# Patient Record
Sex: FEMALE | Race: WHITE | NOT HISPANIC OR LATINO | Employment: UNEMPLOYED | URBAN - METROPOLITAN AREA
[De-identification: names, ages, dates, MRNs, and addresses within clinical notes are randomized per-mention and may not be internally consistent; named-entity substitution may affect disease eponyms.]

---

## 2023-05-16 ENCOUNTER — TELEPHONE (OUTPATIENT)
Dept: PRIMARY CARE | Facility: CLINIC | Age: 58
End: 2023-05-16
Payer: COMMERCIAL

## 2023-05-16 NOTE — TELEPHONE ENCOUNTER
She called and she was just at the urgent care and they told to call us to see if we could put an order for CT scan and for her chronic pain in neck and back and she don't want to go to the ER and they think might be a disk.

## 2023-05-17 NOTE — TELEPHONE ENCOUNTER
For CT needs OV. Ok to schedule an acute OV with any openings. Will need documentation to support imaging. Thank you!

## 2023-05-23 PROBLEM — I50.22 SYSTOLIC HEART FAILURE, CHRONIC (MULTI): Status: ACTIVE | Noted: 2023-05-23

## 2023-05-23 PROBLEM — I87.2 VENOUS INSUFFICIENCY: Status: ACTIVE | Noted: 2023-05-23

## 2023-05-23 PROBLEM — M43.02 SPONDYLOLYSIS, CERVICAL REGION: Status: ACTIVE | Noted: 2023-05-23

## 2023-05-23 PROBLEM — I73.9 PVD (PERIPHERAL VASCULAR DISEASE) (CMS-HCC): Status: ACTIVE | Noted: 2023-05-23

## 2023-05-23 PROBLEM — I50.42 CHRONIC COMBINED SYSTOLIC AND DIASTOLIC CHF, NYHA CLASS 3 (MULTI): Status: ACTIVE | Noted: 2023-05-23

## 2023-05-23 PROBLEM — R92.8 ABNORMAL MAMMOGRAM: Status: ACTIVE | Noted: 2023-05-23

## 2023-05-23 PROBLEM — K59.00 CONSTIPATION: Status: ACTIVE | Noted: 2019-10-16

## 2023-05-23 PROBLEM — M25.561 BILATERAL KNEE PAIN: Status: ACTIVE | Noted: 2023-05-23

## 2023-05-23 PROBLEM — R42 DIZZINESS AND GIDDINESS: Status: ACTIVE | Noted: 2019-10-16

## 2023-05-23 PROBLEM — E55.9 VITAMIN D DEFICIENCY: Status: ACTIVE | Noted: 2023-05-23

## 2023-05-23 PROBLEM — M79.602 PAIN IN LEFT ARM: Status: ACTIVE | Noted: 2023-05-23

## 2023-05-23 PROBLEM — E66.01 MORBID OBESITY (MULTI): Status: ACTIVE | Noted: 2020-10-05

## 2023-05-23 PROBLEM — E78.5 HYPERLIPIDEMIA: Status: ACTIVE | Noted: 2023-05-23

## 2023-05-23 PROBLEM — M54.9 ACUTE BACK PAIN: Status: ACTIVE | Noted: 2023-05-23

## 2023-05-23 PROBLEM — R14.3 FLATULENCE, ERUCTATION AND GAS PAIN: Status: ACTIVE | Noted: 2019-10-16

## 2023-05-23 PROBLEM — E11.9 TYPE 2 DIABETES MELLITUS (MULTI): Status: ACTIVE | Noted: 2023-05-23

## 2023-05-23 PROBLEM — J44.9 COPD (CHRONIC OBSTRUCTIVE PULMONARY DISEASE) (MULTI): Status: ACTIVE | Noted: 2023-05-23

## 2023-05-23 PROBLEM — M50.30 DEGENERATION OF INTERVERTEBRAL DISC OF CERVICAL REGION: Status: ACTIVE | Noted: 2023-05-23

## 2023-05-23 PROBLEM — M47.812 CERVICAL SPONDYLOSIS: Status: ACTIVE | Noted: 2023-05-23

## 2023-05-23 PROBLEM — R19.8 OTHER SPECIFIED SYMPTOMS AND SIGNS INVOLVING THE DIGESTIVE SYSTEM AND ABDOMEN: Status: ACTIVE | Noted: 2020-10-05

## 2023-05-23 PROBLEM — R06.02 SOB (SHORTNESS OF BREATH) ON EXERTION: Status: ACTIVE | Noted: 2023-05-23

## 2023-05-23 PROBLEM — R19.8 DIGESTIVE SYMPTOM: Status: ACTIVE | Noted: 2020-10-05

## 2023-05-23 PROBLEM — M54.41 BILATERAL LOW BACK PAIN WITH BILATERAL SCIATICA: Status: ACTIVE | Noted: 2023-05-23

## 2023-05-23 PROBLEM — M54.12 CERVICAL RADICULOPATHY, CHRONIC: Status: ACTIVE | Noted: 2023-05-23

## 2023-05-23 PROBLEM — M50.20 HERNIATED NUCLEUS PULPOSUS, CERVICAL: Status: ACTIVE | Noted: 2023-05-23

## 2023-05-23 PROBLEM — I74.2: Status: ACTIVE | Noted: 2023-05-23

## 2023-05-23 PROBLEM — R53.81 MALAISE AND FATIGUE: Status: ACTIVE | Noted: 2019-10-16

## 2023-05-23 PROBLEM — R29.818 SUSPECTED SLEEP APNEA: Status: ACTIVE | Noted: 2023-05-23

## 2023-05-23 PROBLEM — R53.83 MALAISE AND FATIGUE: Status: ACTIVE | Noted: 2019-10-16

## 2023-05-23 PROBLEM — R00.2 PALPITATIONS: Status: ACTIVE | Noted: 2023-05-23

## 2023-05-23 PROBLEM — M54.42 BILATERAL LOW BACK PAIN WITH BILATERAL SCIATICA: Status: ACTIVE | Noted: 2023-05-23

## 2023-05-23 PROBLEM — G99.2 MYELOPATHY CONCURRENT WITH AND DUE TO SPINAL STENOSIS OF CERVICAL REGION (MULTI): Status: ACTIVE | Noted: 2023-05-23

## 2023-05-23 PROBLEM — M51.36 DEGENERATION OF INTERVERTEBRAL DISC OF LUMBAR REGION: Status: ACTIVE | Noted: 2023-05-23

## 2023-05-23 PROBLEM — M25.50 JOINT PAIN: Status: ACTIVE | Noted: 2020-10-05

## 2023-05-23 PROBLEM — R14.2 FLATULENCE, ERUCTATION AND GAS PAIN: Status: ACTIVE | Noted: 2019-10-16

## 2023-05-23 PROBLEM — M48.02 MYELOPATHY CONCURRENT WITH AND DUE TO SPINAL STENOSIS OF CERVICAL REGION (MULTI): Status: ACTIVE | Noted: 2023-05-23

## 2023-05-23 PROBLEM — R60.9 EDEMA: Status: ACTIVE | Noted: 2020-10-05

## 2023-05-23 PROBLEM — I42.9 CARDIOMYOPATHY (MULTI): Status: ACTIVE | Noted: 2023-05-23

## 2023-05-23 PROBLEM — M25.562 BILATERAL KNEE PAIN: Status: ACTIVE | Noted: 2023-05-23

## 2023-05-23 PROBLEM — M47.816 LUMBAR SPONDYLOSIS: Status: ACTIVE | Noted: 2023-05-23

## 2023-05-23 PROBLEM — F17.200 NICOTINE DEPENDENCE: Status: ACTIVE | Noted: 2023-05-23

## 2023-05-23 PROBLEM — M54.50 LOW BACK PAIN: Status: ACTIVE | Noted: 2019-10-16

## 2023-05-23 PROBLEM — R35.0 URINE FREQUENCY: Status: ACTIVE | Noted: 2023-05-23

## 2023-05-23 PROBLEM — R63.5 ABNORMAL WEIGHT GAIN: Status: ACTIVE | Noted: 2019-10-16

## 2023-05-23 PROBLEM — M51.369 DEGENERATION OF INTERVERTEBRAL DISC OF LUMBAR REGION: Status: ACTIVE | Noted: 2023-05-23

## 2023-05-23 PROBLEM — R00.0 TACHYCARDIA: Status: ACTIVE | Noted: 2023-05-23

## 2023-05-23 PROBLEM — J30.9 ALLERGIC RHINITIS: Status: ACTIVE | Noted: 2023-05-23

## 2023-05-23 PROBLEM — R14.1 FLATULENCE, ERUCTATION AND GAS PAIN: Status: ACTIVE | Noted: 2019-10-16

## 2023-05-23 RX ORDER — MULTIVITAMIN
1 TABLET ORAL DAILY
COMMUNITY

## 2023-05-23 RX ORDER — MONTELUKAST SODIUM 10 MG/1
1 TABLET ORAL NIGHTLY
COMMUNITY
Start: 2023-04-28 | End: 2023-11-13

## 2023-05-23 RX ORDER — IBUPROFEN 800 MG/1
1 TABLET ORAL 2 TIMES DAILY PRN
COMMUNITY
Start: 2022-08-23 | End: 2023-11-25 | Stop reason: ENTERED-IN-ERROR

## 2023-05-23 RX ORDER — METFORMIN HYDROCHLORIDE 500 MG/1
1 TABLET, EXTENDED RELEASE ORAL 2 TIMES DAILY
COMMUNITY
Start: 2023-01-11 | End: 2023-07-19

## 2023-05-23 RX ORDER — CARVEDILOL 12.5 MG/1
1 TABLET ORAL
COMMUNITY
End: 2024-01-03

## 2023-05-23 RX ORDER — SPIRONOLACTONE 25 MG/1
1 TABLET ORAL DAILY
COMMUNITY
Start: 2022-12-28 | End: 2023-11-25 | Stop reason: ENTERED-IN-ERROR

## 2023-05-23 RX ORDER — ATORVASTATIN CALCIUM 20 MG/1
1 TABLET, FILM COATED ORAL NIGHTLY
COMMUNITY
Start: 2022-07-15 | End: 2023-11-13

## 2023-05-23 RX ORDER — ACETAMINOPHEN 500 MG
500 TABLET ORAL EVERY 6 HOURS PRN
COMMUNITY
End: 2023-10-16 | Stop reason: WASHOUT

## 2023-05-23 RX ORDER — OLMESARTAN MEDOXOMIL 40 MG/1
1 TABLET ORAL DAILY
COMMUNITY
Start: 2019-10-16 | End: 2024-04-09 | Stop reason: SDUPTHER

## 2023-05-23 RX ORDER — LORATADINE 10 MG/1
10 TABLET ORAL DAILY
COMMUNITY
End: 2024-04-16 | Stop reason: WASHOUT

## 2023-05-23 RX ORDER — LANCETS 33 GAUGE
EACH MISCELLANEOUS
COMMUNITY
Start: 2023-04-17 | End: 2024-04-09 | Stop reason: WASHOUT

## 2023-05-23 RX ORDER — ALBUTEROL SULFATE 90 UG/1
2 AEROSOL, METERED RESPIRATORY (INHALATION) EVERY 6 HOURS PRN
COMMUNITY
Start: 2020-11-04 | End: 2023-10-16 | Stop reason: SDUPTHER

## 2023-05-23 RX ORDER — IPRATROPIUM BROMIDE AND ALBUTEROL SULFATE 2.5; .5 MG/3ML; MG/3ML
3 SOLUTION RESPIRATORY (INHALATION) EVERY 4 HOURS PRN
COMMUNITY
Start: 2021-08-18

## 2023-05-23 RX ORDER — TIOTROPIUM BROMIDE AND OLODATEROL 3.124; 2.736 UG/1; UG/1
2 SPRAY, METERED RESPIRATORY (INHALATION) DAILY
COMMUNITY
Start: 2022-10-04 | End: 2023-07-19 | Stop reason: ALTCHOICE

## 2023-05-23 RX ORDER — FLUTICASONE FUROATE, UMECLIDINIUM BROMIDE AND VILANTEROL TRIFENATATE 100; 62.5; 25 UG/1; UG/1; UG/1
1 POWDER RESPIRATORY (INHALATION) DAILY
COMMUNITY
Start: 2022-10-28 | End: 2023-10-16

## 2023-05-23 RX ORDER — FLUTICASONE PROPIONATE 50 MCG
1 SPRAY, SUSPENSION (ML) NASAL 2 TIMES DAILY
COMMUNITY
Start: 2021-08-18 | End: 2024-04-16 | Stop reason: SDUPTHER

## 2023-05-23 RX ORDER — FUROSEMIDE 80 MG/1
80 TABLET ORAL 2 TIMES DAILY
COMMUNITY
Start: 2022-07-30 | End: 2023-10-16 | Stop reason: WASHOUT

## 2023-05-23 RX ORDER — CELECOXIB 200 MG/1
200 CAPSULE ORAL 2 TIMES DAILY
COMMUNITY
Start: 2022-07-15 | End: 2023-10-16 | Stop reason: WASHOUT

## 2023-05-23 RX ORDER — TIOTROPIUM BROMIDE INHALATION SPRAY 3.12 UG/1
2 SPRAY, METERED RESPIRATORY (INHALATION) DAILY
COMMUNITY
End: 2023-07-19 | Stop reason: ALTCHOICE

## 2023-05-25 ENCOUNTER — TELEMEDICINE (OUTPATIENT)
Dept: PRIMARY CARE | Facility: CLINIC | Age: 58
End: 2023-05-25
Payer: COMMERCIAL

## 2023-05-25 DIAGNOSIS — M54.12 CERVICAL RADICULOPATHY, CHRONIC: Primary | ICD-10-CM

## 2023-05-25 PROCEDURE — 99442 PR PHYS/QHP TELEPHONE EVALUATION 11-20 MIN: CPT | Performed by: CLINICAL NURSE SPECIALIST

## 2023-05-25 RX ORDER — HYDROCODONE BITARTRATE AND ACETAMINOPHEN 5; 325 MG/1; MG/1
1 TABLET ORAL 3 TIMES DAILY PRN
Qty: 15 TABLET | Refills: 0 | Status: SHIPPED | OUTPATIENT
Start: 2023-05-25 | End: 2023-05-30

## 2023-05-25 ASSESSMENT — ENCOUNTER SYMPTOMS
LOSS OF SENSATION IN FEET: 0
CONFUSION: 0
WOUND: 0
CHILLS: 0
JOINT SWELLING: 0
NAUSEA: 0
BACK PAIN: 0
PALPITATIONS: 0
SEIZURES: 0
NECK PAIN: 1
UNEXPECTED WEIGHT CHANGE: 0
SHORTNESS OF BREATH: 0
BRUISES/BLEEDS EASILY: 0
CONSTIPATION: 0
COUGH: 0
SORE THROAT: 0
ARTHRALGIAS: 1
DIZZINESS: 0
MYALGIAS: 0
FATIGUE: 0
ABDOMINAL PAIN: 0
HEADACHES: 0
POLYDIPSIA: 0
VOMITING: 0
SLEEP DISTURBANCE: 0
FLANK PAIN: 0
OCCASIONAL FEELINGS OF UNSTEADINESS: 0
HEMATURIA: 0
FEVER: 0
CHEST TIGHTNESS: 0
DEPRESSION: 0
PHOTOPHOBIA: 0
DIARRHEA: 0
TROUBLE SWALLOWING: 0
EYE PAIN: 0
BLOOD IN STOOL: 0
DYSURIA: 0
APPETITE CHANGE: 0
ACTIVITY CHANGE: 0
WHEEZING: 0

## 2023-05-25 ASSESSMENT — COLUMBIA-SUICIDE SEVERITY RATING SCALE - C-SSRS
2. HAVE YOU ACTUALLY HAD ANY THOUGHTS OF KILLING YOURSELF?: NO
1. IN THE PAST MONTH, HAVE YOU WISHED YOU WERE DEAD OR WISHED YOU COULD GO TO SLEEP AND NOT WAKE UP?: NO
6. HAVE YOU EVER DONE ANYTHING, STARTED TO DO ANYTHING, OR PREPARED TO DO ANYTHING TO END YOUR LIFE?: NO

## 2023-05-25 ASSESSMENT — PATIENT HEALTH QUESTIONNAIRE - PHQ9
SUM OF ALL RESPONSES TO PHQ9 QUESTIONS 1 AND 2: 0
1. LITTLE INTEREST OR PLEASURE IN DOING THINGS: NOT AT ALL
2. FEELING DOWN, DEPRESSED OR HOPELESS: NOT AT ALL

## 2023-05-25 NOTE — PROGRESS NOTES
Subjective   Patient ID: Nguyen Ponce is a 57 y.o. female who presents for Telephone visit (Telephone visit Discuss Ct scan results ).  HPI    Virtual visit today as a follow up for complaints. Patient states that she has been having issues with increased neck pain. Radiating towards the back. Discussed with Cardiologist, per patient was told that it was not Cardiac related. Went to the Urgent Care and was evaluated at [facility] in [place]. Was recommended that she needed to follow with PCP. Offered muscle relaxers per patient, she declined. Recommended to have CT and see PCP. Concerned that it is degenerative disc disease. Patient states that she has limited mobility. Complaints of increased pain, weakness. Sitting/standing. Taking Motrin 9/day. Using Hot/Cold packs. Full body massaging without relief. 3 weeks today. Interrupting her sleep/life in general. Recently gotten a Gym membership afraid to use with her discomfort. Denies any fall/injury/trauma.     Felt as a pinched nerve as well. Radiates to the upper part of her back. Recommended to have Sleep Study from Cardiology. Neck pain extends down. Increased soreness extending. Previously had fusion, unsure exactly which were done.  Increased pain down into the Shoulders.     Review of Systems   Constitutional:  Negative for activity change, appetite change, chills, fatigue, fever and unexpected weight change.   HENT:  Negative for ear pain, hearing loss, nosebleeds, sore throat, tinnitus and trouble swallowing.    Eyes:  Negative for photophobia, pain and visual disturbance.   Respiratory:  Negative for cough, chest tightness, shortness of breath and wheezing.    Cardiovascular:  Negative for chest pain, palpitations and leg swelling.   Gastrointestinal:  Negative for abdominal pain, blood in stool, constipation, diarrhea, nausea and vomiting.   Endocrine: Negative for cold intolerance, heat intolerance, polydipsia and polyuria.   Genitourinary:  Negative for  dysuria, flank pain and hematuria.   Musculoskeletal:  Positive for arthralgias and neck pain. Negative for back pain, joint swelling and myalgias.   Skin:  Negative for pallor, rash and wound.   Allergic/Immunologic: Negative for immunocompromised state.   Neurological:  Negative for dizziness, seizures and headaches.   Hematological:  Does not bruise/bleed easily.   Psychiatric/Behavioral:  Negative for confusion and sleep disturbance.        Objective   Physical Exam  Constitutional:       Comments: No physical exam due to Telephone encounter.          Assessment/Plan       Cervical Radiculopathy: Worsening and remaining persistent. Imaging ordered. Short course of pain medication to be used for acute pain. I have personally reviewed the OARRS report.  This report is scanned into the electronic medical record.  I have considered the risks of abuse, dependence, addiction and diversion.  I believe that it is clinically appropriate to prescribe this medication. Will follow up pending results. Has a routine follow up scheduled for July.      The patient was interviewed via a secure video link due to the COVID pandemic. The link allowed real-time communication between the patient and the provider. The patient gave permission to perform the clinic visit through this mechanism. We were on the telephone call for approximately 12 minutes. More than 50% of that time was spent in counseling and coordination of care. No physical examination was performed.

## 2023-06-09 ENCOUNTER — TELEPHONE (OUTPATIENT)
Dept: PRIMARY CARE | Facility: CLINIC | Age: 58
End: 2023-06-09
Payer: COMMERCIAL

## 2023-06-09 DIAGNOSIS — M54.12 CERVICAL RADICULOPATHY, CHRONIC: Primary | ICD-10-CM

## 2023-06-09 NOTE — TELEPHONE ENCOUNTER
She called to let us know that her ins denial Ct scan and. Did we get any thing for the [facility] dept.

## 2023-06-12 NOTE — TELEPHONE ENCOUNTER
Aishwarya with Financial aid called as she spoke with the patient about her CT scan this morning, she stated the patient was under the impression that the test was going to be ordered as STAT and the patient told her she won't be able to do physical therapy but Aishwarya isn't sure exactly why. She will call the patient back to advise that the CT scan will be cancelled.

## 2023-06-12 NOTE — TELEPHONE ENCOUNTER
Per Denial letter, states that she is required to do 6 weeks of Exercises in the last 6 months with providing a number of visits to the Insurance. Ok for referral to PT for evaluation. Imaging can then be ordered upon completion. Thank you!

## 2023-06-12 NOTE — TELEPHONE ENCOUNTER
Patient upset that Insurance did not cover CT. No indication for STAT ordering. Discussed with patient require PT,  ordered. Can also refer to Ortho if she would like another evaluation, declined.

## 2023-06-15 LAB
ANION GAP IN SER/PLAS: 14 MMOL/L (ref 10–20)
CALCIUM (MG/DL) IN SER/PLAS: 9.2 MG/DL (ref 8.6–10.3)
CARBON DIOXIDE, TOTAL (MMOL/L) IN SER/PLAS: 22 MMOL/L (ref 21–32)
CHLORIDE (MMOL/L) IN SER/PLAS: 106 MMOL/L (ref 98–107)
CREATININE (MG/DL) IN SER/PLAS: 0.93 MG/DL (ref 0.5–1.05)
GFR FEMALE: 72 ML/MIN/1.73M2
GLUCOSE (MG/DL) IN SER/PLAS: 97 MG/DL (ref 74–99)
NATRIURETIC PEPTIDE B (PG/ML) IN SER/PLAS: 51 PG/ML (ref 0–99)
POTASSIUM (MMOL/L) IN SER/PLAS: 4.3 MMOL/L (ref 3.5–5.3)
SODIUM (MMOL/L) IN SER/PLAS: 138 MMOL/L (ref 136–145)
UREA NITROGEN (MG/DL) IN SER/PLAS: 21 MG/DL (ref 6–23)

## 2023-06-16 LAB
ALLERGEN ANIMAL: CAT DANDER IGE (KU/L): <0.1 KU/L
ALLERGEN ANIMAL: DOG DANDER IGE (KU/L): <0.1 KU/L
ALLERGEN GRASS: BERMUDA GRASS (CYNODON DACTYLON) IGE (KU/L): <0.1 KU/L
ALLERGEN GRASS: JOHNSON GRASS (SORGHUM HALEPENSE) IGE (KU/L): <0.1 KU/L
ALLERGEN GRASS: MEADOW GRASS, KENTUCKY BLUE (POA PRATENSIS )IGE (KU/L): <0.1 KU/L
ALLERGEN GRASS: TIMOTHY GRASS (PHLEUM PRATENSE) IGE (KU/L): <0.1 KU/L
ALLERGEN INSECT: COCKROACH IGE: 0.24 KU/L
ALLERGEN MICROORGANISM: ALTERNARIA ALTERNATA IGE (KU/L): <0.1 KU/L
ALLERGEN MICROORGANISM: ASPERGILLUS FUMIGATUS IGE (KU/L): <0.1 KU/L
ALLERGEN MICROORGANISM: CLADOSPORIUM HERBARUM IGE (KU/L): <0.1 KU/L
ALLERGEN MICROORGANISM: PENICILLIUM CHRYSOGENUM (P. NOTATUM) IGE (KU/L): <0.1 KU/L
ALLERGEN MITE: DERMATOPHAGOIDES FARINAE (HOUSE DUST MITE) IGE (KU/L): <0.1 KU/L
ALLERGEN MITE: DERMATOPHAGOIDES PTERONYSSINUS (HOUSE DUST MITE) IGE (KU/L): <0.1 KU/L
ALLERGEN TREE: BOX-ELDER (ACER NEGUNDO) IGE (KU/L): <0.1 KU/L
ALLERGEN TREE: COMMON SILVER BIRCH (BETULA VERRUCOSA) IGE (KU/L): <0.1 KU/L
ALLERGEN TREE: COTTONWOOD (POPULUS DELTOIDES) IGE (KU/L): <0.1 KU/L
ALLERGEN TREE: ELM (ULMUS AMERICANA) IGE (KU/L): <0.1 KU/L
ALLERGEN TREE: MAPLE LEAF SYCAMORE, LONDON PLANE IGE (KU/L): <0.1 KU/L
ALLERGEN TREE: MOUNTAIN JUNIPER (JUNIPERUS SABINOIDES) IGE (KU/L): <0.1 KU/L
ALLERGEN TREE: MULBERRY (MORUS ALBA) IGE (KU/L): <0.1 KU/L
ALLERGEN TREE: OAK (QUERCUS ALBA) IGE (KU/L): <0.1 KU/L
ALLERGEN TREE: PECAN, HICKORY (CARYA PECAN) IGE (KU/L): <0.1 KU/L
ALLERGEN TREE: WALNUT IGE: <0.1 KU/L
ALLERGEN TREE: WHITE ASH (FRAXINUS AMERICANA) IGE (KU/L): <0.1 KU/L
ALLERGEN WEED: COMMON PIGWEED (AMARANTHUS RETROFLEXUS) IGE (KU/L): <0.1 KU/L
ALLERGEN WEED: COMMON RAGWEED (AMB. ARTEMISIIFOLIA/A. ELATIOR) IGE (KU/L): <0.1 KU/L
ALLERGEN WEED: GOOSEFOOT, LAMB'S QUARTERS (CHENOPODIUM ALBUM) IGE (KU/L): <0.1 KU/L
ALLERGEN WEED: PLANTAIN (ENGLISH), RIBWORT (PLANTAGO LANCEOLATA) IGE (KU/L): <0.1 KU/L
ALLERGEN WEED: PRICKLY SALTWORT/RUSSIAN THISTLE (SALSOLA KALI) IGE (KU/L): <0.1 KU/L
ALLERGEN WEED: SHEEP SORREL (RUMEX ACETOSELLA) IGE (KU/L): <0.1 KU/L
IMMUNOCAP IGE: 30.4 KU/L (ref 0–214)
IMMUNOCAP INTERPRETATION: NORMAL

## 2023-07-19 ENCOUNTER — OFFICE VISIT (OUTPATIENT)
Dept: PRIMARY CARE | Facility: CLINIC | Age: 58
End: 2023-07-19
Payer: COMMERCIAL

## 2023-07-19 VITALS
HEART RATE: 80 BPM | HEIGHT: 65 IN | DIASTOLIC BLOOD PRESSURE: 100 MMHG | SYSTOLIC BLOOD PRESSURE: 140 MMHG | WEIGHT: 236 LBS | BODY MASS INDEX: 39.32 KG/M2

## 2023-07-19 DIAGNOSIS — J42 CHRONIC BRONCHITIS, UNSPECIFIED CHRONIC BRONCHITIS TYPE (MULTI): ICD-10-CM

## 2023-07-19 DIAGNOSIS — E78.2 MIXED HYPERLIPIDEMIA: ICD-10-CM

## 2023-07-19 DIAGNOSIS — I50.22 SYSTOLIC HEART FAILURE, CHRONIC (MULTI): Primary | ICD-10-CM

## 2023-07-19 DIAGNOSIS — I74.2: ICD-10-CM

## 2023-07-19 DIAGNOSIS — E55.9 VITAMIN D DEFICIENCY: ICD-10-CM

## 2023-07-19 DIAGNOSIS — E11.65 TYPE 2 DIABETES MELLITUS WITH HYPERGLYCEMIA, WITHOUT LONG-TERM CURRENT USE OF INSULIN (MULTI): ICD-10-CM

## 2023-07-19 PROBLEM — M48.02 MYELOPATHY CONCURRENT WITH AND DUE TO SPINAL STENOSIS OF CERVICAL REGION (MULTI): Status: RESOLVED | Noted: 2023-05-23 | Resolved: 2023-07-19

## 2023-07-19 PROBLEM — G99.2 MYELOPATHY CONCURRENT WITH AND DUE TO SPINAL STENOSIS OF CERVICAL REGION (MULTI): Status: RESOLVED | Noted: 2023-05-23 | Resolved: 2023-07-19

## 2023-07-19 PROCEDURE — 99214 OFFICE O/P EST MOD 30 MIN: CPT | Performed by: CLINICAL NURSE SPECIALIST

## 2023-07-19 PROCEDURE — 3044F HG A1C LEVEL LT 7.0%: CPT | Performed by: CLINICAL NURSE SPECIALIST

## 2023-07-19 PROCEDURE — 3077F SYST BP >= 140 MM HG: CPT | Performed by: CLINICAL NURSE SPECIALIST

## 2023-07-19 PROCEDURE — 3080F DIAST BP >= 90 MM HG: CPT | Performed by: CLINICAL NURSE SPECIALIST

## 2023-07-19 PROCEDURE — 4010F ACE/ARB THERAPY RXD/TAKEN: CPT | Performed by: CLINICAL NURSE SPECIALIST

## 2023-07-19 PROCEDURE — 1036F TOBACCO NON-USER: CPT | Performed by: CLINICAL NURSE SPECIALIST

## 2023-07-19 ASSESSMENT — ENCOUNTER SYMPTOMS
SEIZURES: 0
CONSTIPATION: 1
BRUISES/BLEEDS EASILY: 0
TROUBLE SWALLOWING: 0
DIARRHEA: 0
FEVER: 0
NECK PAIN: 0
BACK PAIN: 0
POLYDIPSIA: 0
ABDOMINAL PAIN: 1
BLOOD IN STOOL: 0
UNEXPECTED WEIGHT CHANGE: 0
WOUND: 0
VOMITING: 0
PHOTOPHOBIA: 0
COUGH: 0
WHEEZING: 0
CHEST TIGHTNESS: 0
FLANK PAIN: 0
HEADACHES: 0
SHORTNESS OF BREATH: 0
FATIGUE: 0
MYALGIAS: 0
ACTIVITY CHANGE: 0
DIZZINESS: 0
ARTHRALGIAS: 1
EYE PAIN: 0
NAUSEA: 0
PALPITATIONS: 0
CHILLS: 0
DYSURIA: 0
CONFUSION: 0
OCCASIONAL FEELINGS OF UNSTEADINESS: 0
SLEEP DISTURBANCE: 0
DEPRESSION: 0
HEMATURIA: 0
JOINT SWELLING: 0
APPETITE CHANGE: 0
LOSS OF SENSATION IN FEET: 0
SORE THROAT: 0

## 2023-07-19 ASSESSMENT — PATIENT HEALTH QUESTIONNAIRE - PHQ9
SUM OF ALL RESPONSES TO PHQ9 QUESTIONS 1 AND 2: 0
2. FEELING DOWN, DEPRESSED OR HOPELESS: NOT AT ALL
1. LITTLE INTEREST OR PLEASURE IN DOING THINGS: NOT AT ALL

## 2023-07-19 ASSESSMENT — COLUMBIA-SUICIDE SEVERITY RATING SCALE - C-SSRS
2. HAVE YOU ACTUALLY HAD ANY THOUGHTS OF KILLING YOURSELF?: NO
6. HAVE YOU EVER DONE ANYTHING, STARTED TO DO ANYTHING, OR PREPARED TO DO ANYTHING TO END YOUR LIFE?: NO
1. IN THE PAST MONTH, HAVE YOU WISHED YOU WERE DEAD OR WISHED YOU COULD GO TO SLEEP AND NOT WAKE UP?: NO

## 2023-07-19 NOTE — PATIENT INSTRUCTIONS

## 2023-07-19 NOTE — PROGRESS NOTES
"Subjective   Patient ID: Nguyen Ponce is a 57 y.o. female who presents for Follow-up (6 month follow up/Discuss Gi issues from Metformin ).  HPI    Here today as a follow up appointment.     Following with Dr. Mari for Cardiology, diagnosed with Heart failure in 2021. Hypertension. Long standing history of Hypertension, uncontrolled. Medications now adjusted by Cardiology. Cardiac MRI and ECHO in December. Monitoring her blood pressure at home, states that it will fluctuate. Recent OV.    Complaints of Arthritis. Previously followed with Rheumatology, not interested in following back with them. Using Two Tylenol ES daily. Some improvement after using medication. Did not tolerate Celebrex. Right hand dominant. Worse with getting up in the AM and picking anything up in her hand. \"Tender.\" Feels that it extends down into her hand. DId not have a good response with Rheumatologist in the past.     Neck pain has improved. Able to improve her ROM. Pain down into the Buttocks, radiating down.     COPD. Following with Milena DUFFY for Pulmonology.     Patient states that she has chronic issues with Fatigue, cardiac medications adjusted by Cardiology.     Last lab work indicated Diabetes. Has been making some dietary changes to help with her numbers. Does not monitor her blood sugars at home. Started on Metformin, no longer tolerating medication. GI, increased gas. No improvement with XR. Has not tried any other medications besides the Metformin in the past.  Patient states that she has been having increased abdominal pain and bloating. Started worsening about 2 months ago, decreased Bread intake. Started eating Kalia bread. Planning Colonoscopy.  Changes in her stool. Harder to pass stools.    Planning to reschedule visit with Sleep Medicine.      Review of Systems   Constitutional:  Negative for activity change, appetite change, chills, fatigue, fever and unexpected weight change.   HENT:  Negative for ear pain, hearing " loss, nosebleeds, sore throat, tinnitus and trouble swallowing.    Eyes:  Negative for photophobia, pain and visual disturbance.   Respiratory:  Negative for cough, chest tightness, shortness of breath and wheezing.    Cardiovascular:  Negative for chest pain, palpitations and leg swelling.   Gastrointestinal:  Positive for abdominal pain and constipation. Negative for blood in stool, diarrhea, nausea and vomiting.   Endocrine: Negative for cold intolerance, heat intolerance, polydipsia and polyuria.   Genitourinary:  Negative for dysuria, flank pain and hematuria.   Musculoskeletal:  Positive for arthralgias. Negative for back pain, joint swelling, myalgias and neck pain.   Skin:  Negative for pallor, rash and wound.   Allergic/Immunologic: Negative for immunocompromised state.   Neurological:  Negative for dizziness, seizures and headaches.   Hematological:  Does not bruise/bleed easily.   Psychiatric/Behavioral:  Negative for confusion and sleep disturbance.        Objective   Physical Exam  Vitals and nursing note reviewed.   Constitutional:       General: She is not in acute distress.     Appearance: Normal appearance.   HENT:      Head: Normocephalic.      Nose: Nose normal.   Eyes:      Conjunctiva/sclera: Conjunctivae normal.   Neck:      Vascular: No carotid bruit.   Cardiovascular:      Rate and Rhythm: Normal rate and regular rhythm.      Pulses: Normal pulses.      Heart sounds: Normal heart sounds.   Pulmonary:      Effort: Pulmonary effort is normal.      Breath sounds: Normal breath sounds.   Abdominal:      General: Bowel sounds are normal.      Palpations: Abdomen is soft.   Musculoskeletal:         General: Normal range of motion.      Cervical back: Normal range of motion.   Skin:     General: Skin is warm and dry.   Neurological:      Mental Status: She is alert and oriented to person, place, and time. Mental status is at baseline.   Psychiatric:         Mood and Affect: Mood normal.          Behavior: Behavior normal.       Assessment/Plan          Reviewed recent Specialist appointments with patient.     Cardiomyopathy, Hypertension, Heart Failure, Palpitations: Following with Cardiology. Continue medications as prescribed by Cardiology.   COPD: Following with Pulmonology for management.   Obesity: BMI: 39.27. Lifestyle changes recommended: Diet consisting of low fat foods, lean meats, high fiber, fresh fruits and vegetables. 150 min/ weekly aerobic exercise.  Diabetes: Last A1C 6.3%. Lifestyle changes as noted above. Discontinue Metformin, due to tolerance. Monitor blood sugar readings. Call with blood sugar readings. Declined referral for Nutrition at this time. Updated lab work ordered.   Joint Pain: Continue Tylenol.   Depression/Anxiety: Counseling information provided for patient at last OV.   Vitamin D Deficiency: Vitamin D.     COVID Vaccine: May/June 2021. Discussed Booster.   Mammogram: December 2021, has order for follow up Mammogram.  Colonoscopy: 2011.   Unable to updated immunizations due to Insurance. Patient was identified as a fall risk. Risk prevention instructions provided.

## 2023-07-20 ENCOUNTER — LAB (OUTPATIENT)
Dept: LAB | Facility: LAB | Age: 58
End: 2023-07-20
Payer: COMMERCIAL

## 2023-07-20 DIAGNOSIS — E78.2 MIXED HYPERLIPIDEMIA: ICD-10-CM

## 2023-07-20 DIAGNOSIS — E11.65 TYPE 2 DIABETES MELLITUS WITH HYPERGLYCEMIA, WITHOUT LONG-TERM CURRENT USE OF INSULIN (MULTI): ICD-10-CM

## 2023-07-20 DIAGNOSIS — M54.12 CERVICAL RADICULOPATHY, CHRONIC: ICD-10-CM

## 2023-07-20 DIAGNOSIS — E55.9 VITAMIN D DEFICIENCY: ICD-10-CM

## 2023-07-20 DIAGNOSIS — I50.22 SYSTOLIC HEART FAILURE, CHRONIC (MULTI): ICD-10-CM

## 2023-07-20 LAB
ALANINE AMINOTRANSFERASE (SGPT) (U/L) IN SER/PLAS: 12 U/L (ref 7–45)
ALBUMIN (G/DL) IN SER/PLAS: 4 G/DL (ref 3.4–5)
ALBUMIN (MG/L) IN URINE: <7 MG/L
ALBUMIN/CREATININE (UG/MG) IN URINE: NORMAL UG/MG CRT (ref 0–30)
ALKALINE PHOSPHATASE (U/L) IN SER/PLAS: 54 U/L (ref 33–110)
ANION GAP IN SER/PLAS: 13 MMOL/L (ref 10–20)
ASPARTATE AMINOTRANSFERASE (SGOT) (U/L) IN SER/PLAS: 11 U/L (ref 9–39)
BILIRUBIN TOTAL (MG/DL) IN SER/PLAS: 0.4 MG/DL (ref 0–1.2)
CALCIDIOL (25 OH VITAMIN D3) (NG/ML) IN SER/PLAS: 25 NG/ML
CALCIUM (MG/DL) IN SER/PLAS: 9.1 MG/DL (ref 8.6–10.3)
CARBON DIOXIDE, TOTAL (MMOL/L) IN SER/PLAS: 30 MMOL/L (ref 21–32)
CHLORIDE (MMOL/L) IN SER/PLAS: 107 MMOL/L (ref 98–107)
CHOLESTEROL (MG/DL) IN SER/PLAS: 226 MG/DL (ref 0–199)
CHOLESTEROL IN HDL (MG/DL) IN SER/PLAS: 36.7 MG/DL
CHOLESTEROL/HDL RATIO: 6.2
COBALAMIN (VITAMIN B12) (PG/ML) IN SER/PLAS: 518 PG/ML (ref 211–911)
CREATININE (MG/DL) IN SER/PLAS: 0.89 MG/DL (ref 0.5–1.05)
CREATININE (MG/DL) IN URINE: 89.9 MG/DL (ref 20–320)
ERYTHROCYTE DISTRIBUTION WIDTH (RATIO) BY AUTOMATED COUNT: 13.4 % (ref 11.5–14.5)
ERYTHROCYTE MEAN CORPUSCULAR HEMOGLOBIN CONCENTRATION (G/DL) BY AUTOMATED: 32.1 G/DL (ref 32–36)
ERYTHROCYTE MEAN CORPUSCULAR VOLUME (FL) BY AUTOMATED COUNT: 104 FL (ref 80–100)
ERYTHROCYTES (10*6/UL) IN BLOOD BY AUTOMATED COUNT: 4.24 X10E12/L (ref 4–5.2)
ESTIMATED AVERAGE GLUCOSE FOR HBA1C: 151 MG/DL
GFR FEMALE: 76 ML/MIN/1.73M2
GLUCOSE (MG/DL) IN SER/PLAS: 116 MG/DL (ref 74–99)
HEMATOCRIT (%) IN BLOOD BY AUTOMATED COUNT: 44.2 % (ref 36–46)
HEMOGLOBIN (G/DL) IN BLOOD: 14.2 G/DL (ref 12–16)
HEMOGLOBIN A1C/HEMOGLOBIN TOTAL IN BLOOD: 6.9 %
LDL: 137 MG/DL (ref 0–99)
LEUKOCYTES (10*3/UL) IN BLOOD BY AUTOMATED COUNT: 8.3 X10E9/L (ref 4.4–11.3)
NON HDL CHOLESTEROL: 189 MG/DL
PLATELETS (10*3/UL) IN BLOOD AUTOMATED COUNT: 327 X10E9/L (ref 150–450)
POTASSIUM (MMOL/L) IN SER/PLAS: 4.5 MMOL/L (ref 3.5–5.3)
PROTEIN TOTAL: 6.3 G/DL (ref 6.4–8.2)
SODIUM (MMOL/L) IN SER/PLAS: 145 MMOL/L (ref 136–145)
THYROTROPIN (MIU/L) IN SER/PLAS BY DETECTION LIMIT <= 0.05 MIU/L: 1.79 MIU/L (ref 0.44–3.98)
TRIGLYCERIDE (MG/DL) IN SER/PLAS: 260 MG/DL (ref 0–149)
UREA NITROGEN (MG/DL) IN SER/PLAS: 20 MG/DL (ref 6–23)
VLDL: 52 MG/DL (ref 0–40)

## 2023-07-20 PROCEDURE — 80061 LIPID PANEL: CPT

## 2023-07-20 PROCEDURE — 83036 HEMOGLOBIN GLYCOSYLATED A1C: CPT

## 2023-07-20 PROCEDURE — 85027 COMPLETE CBC AUTOMATED: CPT

## 2023-07-20 PROCEDURE — 82570 ASSAY OF URINE CREATININE: CPT

## 2023-07-20 PROCEDURE — 82043 UR ALBUMIN QUANTITATIVE: CPT

## 2023-07-20 PROCEDURE — 36415 COLL VENOUS BLD VENIPUNCTURE: CPT

## 2023-07-20 PROCEDURE — 80053 COMPREHEN METABOLIC PANEL: CPT

## 2023-07-20 PROCEDURE — 84443 ASSAY THYROID STIM HORMONE: CPT

## 2023-07-20 PROCEDURE — 82306 VITAMIN D 25 HYDROXY: CPT

## 2023-07-20 PROCEDURE — 82607 VITAMIN B-12: CPT

## 2023-07-24 ENCOUNTER — TELEPHONE (OUTPATIENT)
Dept: PRIMARY CARE | Facility: CLINIC | Age: 58
End: 2023-07-24
Payer: COMMERCIAL

## 2023-07-24 DIAGNOSIS — E11.65 TYPE 2 DIABETES MELLITUS WITH HYPERGLYCEMIA, WITHOUT LONG-TERM CURRENT USE OF INSULIN (MULTI): Primary | ICD-10-CM

## 2023-07-24 RX ORDER — DULAGLUTIDE 0.75 MG/.5ML
0.75 INJECTION, SOLUTION SUBCUTANEOUS
Qty: 2 ML | Refills: 11 | Status: ON HOLD | OUTPATIENT
Start: 2023-07-24 | End: 2023-11-28 | Stop reason: SDUPTHER

## 2023-07-24 NOTE — TELEPHONE ENCOUNTER
Patient notified. Would like injection. Also since stopping the metformin the bloating/gas has stopped. She also added fiber.   [facility] [place]

## 2023-07-24 NOTE — TELEPHONE ENCOUNTER
----- Message from CATALINA Duenas-CNS sent at 7/23/2023  9:35 PM EDT -----  Please call patient with lab results. A1C is worse. Does she want the Injectable to help with management? Cholesterol is still elevated. Has she been taking her Lipitor? If so, will need to increase. Vitamin D is low. Start/increase Vitamin D. Will need to repeat all lab work prior to follow up appointment. Thank you!

## 2023-09-12 PROBLEM — E11.9 TYPE 2 DIABETES MELLITUS WITHOUT COMPLICATION (MULTI): Status: ACTIVE | Noted: 2022-09-22

## 2023-09-12 PROBLEM — M54.9 CHRONIC BACK PAIN: Status: ACTIVE | Noted: 2022-09-22

## 2023-09-12 PROBLEM — I10 HYPERTENSION: Status: ACTIVE | Noted: 2022-09-22

## 2023-09-12 PROBLEM — G89.29 CHRONIC BACK PAIN: Status: ACTIVE | Noted: 2022-09-22

## 2023-09-12 PROBLEM — B33.24 VIRAL CARDIOMYOPATHY (MULTI): Status: ACTIVE | Noted: 2022-09-22

## 2023-09-12 RX ORDER — VERAPAMIL HYDROCHLORIDE 180 MG/1
180 CAPSULE, EXTENDED RELEASE ORAL 2 TIMES DAILY
COMMUNITY
End: 2023-11-21 | Stop reason: ALTCHOICE

## 2023-09-12 RX ORDER — METFORMIN HYDROCHLORIDE 500 MG/1
500 TABLET ORAL
COMMUNITY
End: 2023-10-16 | Stop reason: WASHOUT

## 2023-09-12 RX ORDER — FUROSEMIDE 40 MG/1
40 TABLET ORAL DAILY
COMMUNITY
End: 2023-11-14

## 2023-10-04 ENCOUNTER — APPOINTMENT (OUTPATIENT)
Dept: RADIOLOGY | Facility: HOSPITAL | Age: 58
End: 2023-10-04
Payer: COMMERCIAL

## 2023-10-06 PROBLEM — E66.9 OBESITY (BMI 30-39.9): Status: ACTIVE | Noted: 2023-10-06

## 2023-10-06 NOTE — PROGRESS NOTES
"[facility] SLEEP [facility]ICINE [facility]IN[facility]  NEW CONSULT VISIT NOTE    Clinic Location: [facility]  PCP: Angelique Hauser, APRN-CNP  Referred by: No ref. provider found    HISTORY OF PRESENT ILLNESS     Patient ID: Nguyen Ponce is a 58 y.o. female who presents to [facility] for a comprehensive sleep medicine evaluation with concerns of suspected sleep apnea.    Patient is here {pxarrival:04811}.  To review, patient's medical history is notable for ***      {OSAhx:74544}    SLEEP STUDY HISTORY (personally reviewed raw data such as interpretation report, data sheet, hypnogram, and titration table if available and applicable)  ***    SLEEP-WAKE SCHEDULE  Bedtime: ***  Subjective sleep latency: ***  Difficulty falling asleep: ***  Number of awakenings: *** times per night   Falls back asleep in ***  Difficulty staying asleep: ***  Final wake time: ***  Out of bed time: ***  Shift work: ***  Naps: ***  Feels rested after a nap: {Yes/No:57930}  Average sleep duration (excluding naps): ***    SLEEP ENVIRONMENT  Sleep location: ***  Sleep status: {sleep status:80936}  Preferred sleep position: {Sleep position:39431}  TV in bedroom: ***  Room is dark: {Yes/No:71114::\"Yes\"}  Room is quiet: {Yes/No:30163::\"Yes\"}  Room is cool: {Yes/No:05731::\"Yes\"}  Bed comfort: good    SLEEP HABITS  Activities before bedtime: ***  Activities in bed: ***  Clockwatching: {Yes/No:43695}  Smoking: ***  ETOH: ***  Marijuana: ***  Caffeine: ***  Sleep aids: ***    SLEEP ROS  Night symptoms: {sleep apnea ROS at night:71926}  Morning symptoms: {sleep apnea ROS in mornin}  Daytime symptoms {sleep apnea ROS at daytime:77300}  Hypersomnia / narcolepsy symptoms: {narcolepsy ROS:22617}  Parasomnia symptoms: {parasomnia ROS:74513}  Movements in sleep: {sleep movements ROS:04913}    RLS screen: {RLS symptoms:41879}    WEIGHT: {weight:59456}    Claustrophobia: {Yes/No:01989}    REVIEW OF " SYSTEMS     All other systems have been reviewed and are negative.    ALLERGIES     No Known Allergies    MEDICATIONS     Current Outpatient Medications   Medication Sig Dispense Refill    acetaminophen (Tylenol) 500 mg tablet Take 1 tablet (500 mg) by mouth every 6 hours if needed.      albuterol 90 mcg/actuation inhaler Inhale 2 puffs every 6 hours if needed.      atorvastatin (Lipitor) 20 mg tablet Take 1 tablet (20 mg) by mouth once daily at bedtime.      carvedilol (Coreg) 12.5 mg tablet Take 1 tablet (12.5 mg) by mouth 2 times a day with meals.      celecoxib (CeleBREX) 200 mg capsule Take 1 capsule (200 mg) by mouth 2 times a day.      dulaglutide (Trulicity) 0.75 mg/0.5 mL pen injector Inject 0.75 mg under the skin 1 (one) time per week. 2 mL 11    fluticasone (Flonase) 50 mcg/actuation nasal spray Administer 1 spray into affected nostril(s) 2 times a day.      furosemide (Lasix) 40 mg tablet Take 1 tablet (40 mg) by mouth once daily.      furosemide (Lasix) 80 mg tablet Take 1 tablet (80 mg) by mouth 2 times a day.      ibuprofen 800 mg tablet Take 1 tablet (800 mg) by mouth 2 times a day as needed.      ipratropium-albuteroL (Duo-Neb) 0.5-2.5 mg/3 mL nebulizer solution Inhale 3 mL every 4 hours if needed.      loratadine (Claritin) 10 mg tablet Take 1 tablet (10 mg) by mouth once daily.      metFORMIN (Glucophage) 500 mg tablet Take 1 tablet (500 mg) by mouth 2 times a day with meals.      montelukast (Singulair) 10 mg tablet Take 1 tablet (10 mg) by mouth once daily at bedtime.      multivitamin tablet Take 1 tablet by mouth once daily.      olmesartan (BENIcar) 40 mg tablet Take 1 tablet (40 mg) by mouth once daily.      OneTouch Delica Plus Lancet 33 gauge misc use to test once daily      potassium chloride ER (Micro-K) 10 mEq ER capsule Take 1 capsule (10 mEq) by mouth once daily.      spironolactone (Aldactone) 25 mg tablet Take 1 half tablet by mouth once daily.      Trelegy Ellipta 100-62.5-25 mcg  "blister with device Inhale 1 puff once daily.      verapamil ER (Veralan PM) 180 mg 24 hr capsule Take 1 capsule (180 mg) by mouth 2 times a day.       No current facility-administered medications for this visit.       PAST HISTORIES     PERTINENT PAST MEDICAL HISTORY: See HPI    PERTINENT PAST SURGICAL HISTORY for Sleep Medicine:  {surgical history pertinent in sleep:44595}    PERTINENT FAMILY HISTORY for Sleep Medicine:  {family history in sleep:58494}    PERTINENT SOCIAL HISTORY:  She  reports that she quit smoking about 21 months ago. Her smoking use included cigarettes. She smoked an average of .5 packs per day. She has never used smokeless tobacco. She reports current alcohol use. She reports that she does not use drugs. She currently lives {living status:65087} and {current employment status:57973}    Active Problems, Allergy List, Medication List, and PMH/PSH/FH/Social Hx have been reviewed and reconciled in chart. No significant changes unless documented in the pertinent chart section. Updates made when necessary.     PHYSICAL EXAM     VITAL SIGNS: There were no vitals taken for this visit.    NECK CIRCUMFERENCE: ***    ESS: ***  SHYANNE: ***  STOPBANG: ***    Physical Exam  Constitutional: Awake, not in distress  Head: normocephalic, atraumatic  Craniofacial: ***retrognathia  Sinus: *** tenderness to palpation  Nose: ***  Dental: Class *** bite, *** overjet, *** crossbite  Palate: Normal / Narrow / High-arched / *** torus palatini  Oropharynx: Roberson tongue position ***, Mallampati ***, lateral wall narrowing grade ***   Tonsils: ***  Uvula: midline on phonation  Tongue: Midline on protrusion, *** Tongue scalloping, *** macroglossia  Lungs: Clear to auscultation bilateral, no rales  Heart: Regular rate and rhythm, no murmurs  Skin: Warm, no rash  Neuro: No tremors, moves all extremities  Psych: alert and oriented to time, place, and person    RESULTS/DATA     No results found for: \"IRON\", \"TRANSFERRIN\", " "\"IRONSAT\", \"TIBC\", \"FERRITIN\"    Bicarbonate   Date Value Ref Range Status   07/20/2023 30 21 - 32 mmol/L Final       ASSESSMENT/PLAN     Assessment/Plan   Nguyen Ponce is a 58 y.o. female who is seen to evaluate for possible obstructive sleep apnea. The pathophysiology of sleep apnea, diagnostic testing (HST vs PSG), treatment options (PAP, oral appliance, surgery, hypoglossal nerve stimulator called Inspire), and supportive management (weight loss, positional therapy, smoking cessation, avoidance of alcohol and sedatives) were discussed with the patient in detail. Risk factors of sleep apnea as well as cardiometabolic and neurocognitive sequelae associated with untreated sleep apnea were also discussed. Lastly, patient was advised to avoid driving vehicle or operating heavy machinery when sleepy.     {Assess/PlanSmartLinks:19584}    All of patient's questions were answered. She verbalizes understanding and agreement with my assessment and plan.    "

## 2023-10-12 ENCOUNTER — APPOINTMENT (OUTPATIENT)
Dept: SLEEP MEDICINE | Facility: CLINIC | Age: 58
End: 2023-10-12
Payer: COMMERCIAL

## 2023-10-16 ENCOUNTER — OFFICE VISIT (OUTPATIENT)
Dept: PULMONOLOGY | Facility: HOSPITAL | Age: 58
End: 2023-10-16
Payer: COMMERCIAL

## 2023-10-16 VITALS
HEART RATE: 105 BPM | RESPIRATION RATE: 18 BRPM | BODY MASS INDEX: 39.79 KG/M2 | TEMPERATURE: 97.8 F | HEIGHT: 65 IN | WEIGHT: 238.8 LBS | SYSTOLIC BLOOD PRESSURE: 149 MMHG | DIASTOLIC BLOOD PRESSURE: 92 MMHG | OXYGEN SATURATION: 95 %

## 2023-10-16 DIAGNOSIS — J30.9 ALLERGIC RHINITIS, UNSPECIFIED SEASONALITY, UNSPECIFIED TRIGGER: ICD-10-CM

## 2023-10-16 DIAGNOSIS — J44.9 CHRONIC OBSTRUCTIVE PULMONARY DISEASE, UNSPECIFIED COPD TYPE (MULTI): Primary | ICD-10-CM

## 2023-10-16 DIAGNOSIS — Z87.891 FORMER SMOKER: ICD-10-CM

## 2023-10-16 PROCEDURE — 3080F DIAST BP >= 90 MM HG: CPT | Performed by: NURSE PRACTITIONER

## 2023-10-16 PROCEDURE — 4010F ACE/ARB THERAPY RXD/TAKEN: CPT | Performed by: NURSE PRACTITIONER

## 2023-10-16 PROCEDURE — 1036F TOBACCO NON-USER: CPT | Performed by: NURSE PRACTITIONER

## 2023-10-16 PROCEDURE — 3044F HG A1C LEVEL LT 7.0%: CPT | Performed by: NURSE PRACTITIONER

## 2023-10-16 PROCEDURE — 3077F SYST BP >= 140 MM HG: CPT | Performed by: NURSE PRACTITIONER

## 2023-10-16 PROCEDURE — 99213 OFFICE O/P EST LOW 20 MIN: CPT | Performed by: NURSE PRACTITIONER

## 2023-10-16 PROCEDURE — 99213 OFFICE O/P EST LOW 20 MIN: CPT | Mod: ZK | Performed by: NURSE PRACTITIONER

## 2023-10-16 RX ORDER — ALBUTEROL SULFATE 90 UG/1
2 AEROSOL, METERED RESPIRATORY (INHALATION) EVERY 4 HOURS PRN
Qty: 18 G | Refills: 11 | Status: SHIPPED | OUTPATIENT
Start: 2023-10-16

## 2023-10-16 RX ORDER — BUDESONIDE, GLYCOPYRROLATE, AND FORMOTEROL FUMARATE 160; 9; 4.8 UG/1; UG/1; UG/1
2 AEROSOL, METERED RESPIRATORY (INHALATION)
Qty: 24 G | Refills: 11 | Status: SHIPPED | OUTPATIENT
Start: 2023-10-16 | End: 2024-10-15

## 2023-10-16 ASSESSMENT — ENCOUNTER SYMPTOMS
UNEXPECTED WEIGHT CHANGE: 0
SHORTNESS OF BREATH: 1
RHINORRHEA: 0
COUGH: 0
FATIGUE: 0
WHEEZING: 0
DYSPNEA ON EXERTION: 1
FEVER: 0
CHILLS: 0

## 2023-10-16 NOTE — PROGRESS NOTES
Subjective   Patient ID: Nguyen Ponce is a 58 y.o. female who presents for COPD follow up.    HPI: Patient has PMH of COPD, allergic rhinitis, and chronic systolic and diastolic heart failure. She is a former smoker, quit 2022, but smoked at 1 ppd X 40 years. She is currently on Trelegy and uses albuterol prn. She has not had CT done yet but has this scheduled for 10/18. She would like to change to a mist inhaler. She has no other concerns.     General  Pertinent negatives include no chest pain, chills, congestion, coughing (denies hemoptysis.), fatigue or fever.       Review of Systems   Constitutional:  Negative for chills, fatigue, fever and unexpected weight change.   HENT:  Negative for congestion, postnasal drip and rhinorrhea.    Respiratory:  Positive for shortness of breath. Negative for cough (denies hemoptysis.) and wheezing.    Cardiovascular:  Negative for chest pain and leg swelling.   All other systems reviewed and are negative.      Objective   Physical Exam  Vitals reviewed.   Constitutional:       Appearance: Normal appearance.   HENT:      Head: Normocephalic.   Cardiovascular:      Rate and Rhythm: Normal rate and regular rhythm.   Pulmonary:      Effort: Pulmonary effort is normal.      Breath sounds: Decreased breath sounds present.   Skin:     General: Skin is warm and dry.   Neurological:      Mental Status: She is alert.         Assessment/Plan     COPD  2.   Nicotine dependence, in remission  3.   Allergic rhinitis  4.   Chronic systolic and diastolic heart failure  5.   Obesity      Plan:     -PFTs with FEV1/FVC 61, FEV1 54. AAT normal.  -Patient requesting a mist inhaler will try changing Trelegy to Breztri.   -Continue albuterol prn.   -IGE, RAST negative.   -Continue Flonase, Montelukast, and Loratadine.   -She is a former smoker, quit 2022 but smoked for 40 years at 1 ppd.   -LDCT ordered for her today to have done in July, she has this scheduled for 10/18.   -She has lost over 25  lbs since being treated for heart failure.   -She was tested for CEDRIC in 2015 and was negative, continue weight loss strategies.         Overall we will continue with current regimen. I will call her with CT results. I will bring her back with me in 6 months. I instructed patient to call sooner if needed.

## 2023-10-16 NOTE — PATIENT INSTRUCTIONS
Stop Trelegy and start on Breztri twice a day, everyday.  Continue on albuterol prn.   Please get CT scan of your chest as scheduled, I will call you with results.   Call with any questions or concerns.   Follow up with me in 6 months.

## 2023-10-18 ENCOUNTER — HOSPITAL ENCOUNTER (OUTPATIENT)
Dept: RADIOLOGY | Facility: HOSPITAL | Age: 58
Discharge: HOME | End: 2023-10-18
Payer: COMMERCIAL

## 2023-10-18 DIAGNOSIS — Z87.891 PERSONAL HISTORY OF NICOTINE DEPENDENCE: ICD-10-CM

## 2023-10-18 DIAGNOSIS — F17.210 NICOTINE DEPENDENCE, CIGARETTES, UNCOMPLICATED: ICD-10-CM

## 2023-10-18 PROCEDURE — 71271 CT THORAX LUNG CANCER SCR C-: CPT | Performed by: RADIOLOGY

## 2023-10-18 PROCEDURE — 71271 CT THORAX LUNG CANCER SCR C-: CPT

## 2023-11-12 DIAGNOSIS — I50.42 CHRONIC COMBINED SYSTOLIC AND DIASTOLIC CHF, NYHA CLASS 3 (MULTI): Primary | ICD-10-CM

## 2023-11-12 DIAGNOSIS — E78.2 MIXED HYPERLIPIDEMIA: ICD-10-CM

## 2023-11-12 DIAGNOSIS — J30.9 ALLERGIC RHINITIS, UNSPECIFIED SEASONALITY, UNSPECIFIED TRIGGER: Primary | ICD-10-CM

## 2023-11-13 RX ORDER — MONTELUKAST SODIUM 10 MG/1
10 TABLET ORAL NIGHTLY
Qty: 30 TABLET | Refills: 11 | Status: SHIPPED | OUTPATIENT
Start: 2023-11-13 | End: 2023-12-11 | Stop reason: WASHOUT

## 2023-11-13 RX ORDER — ATORVASTATIN CALCIUM 20 MG/1
20 TABLET, FILM COATED ORAL NIGHTLY
Qty: 90 TABLET | Refills: 1 | Status: SHIPPED | OUTPATIENT
Start: 2023-11-13 | End: 2023-11-28 | Stop reason: HOSPADM

## 2023-11-14 RX ORDER — FUROSEMIDE 40 MG/1
40 TABLET ORAL DAILY
Qty: 90 TABLET | Refills: 0 | Status: SHIPPED | OUTPATIENT
Start: 2023-11-14 | End: 2024-03-20

## 2023-11-21 ENCOUNTER — OFFICE VISIT (OUTPATIENT)
Dept: PRIMARY CARE | Facility: CLINIC | Age: 58
End: 2023-11-21
Payer: COMMERCIAL

## 2023-11-21 ENCOUNTER — LAB (OUTPATIENT)
Dept: LAB | Facility: LAB | Age: 58
End: 2023-11-21
Payer: COMMERCIAL

## 2023-11-21 VITALS
BODY MASS INDEX: 39.32 KG/M2 | WEIGHT: 236 LBS | HEART RATE: 93 BPM | DIASTOLIC BLOOD PRESSURE: 100 MMHG | SYSTOLIC BLOOD PRESSURE: 130 MMHG | HEIGHT: 65 IN

## 2023-11-21 DIAGNOSIS — E11.65 TYPE 2 DIABETES MELLITUS WITH HYPERGLYCEMIA, WITHOUT LONG-TERM CURRENT USE OF INSULIN (MULTI): ICD-10-CM

## 2023-11-21 DIAGNOSIS — Z12.11 COLON CANCER SCREENING: ICD-10-CM

## 2023-11-21 DIAGNOSIS — E55.9 VITAMIN D DEFICIENCY: ICD-10-CM

## 2023-11-21 DIAGNOSIS — E66.01 MORBID OBESITY (MULTI): ICD-10-CM

## 2023-11-21 DIAGNOSIS — E66.9 OBESITY (BMI 30-39.9): ICD-10-CM

## 2023-11-21 DIAGNOSIS — E78.2 MIXED HYPERLIPIDEMIA: ICD-10-CM

## 2023-11-21 DIAGNOSIS — J44.9 CHRONIC OBSTRUCTIVE PULMONARY DISEASE, UNSPECIFIED COPD TYPE (MULTI): ICD-10-CM

## 2023-11-21 DIAGNOSIS — I50.22 SYSTOLIC HEART FAILURE, CHRONIC (MULTI): ICD-10-CM

## 2023-11-21 DIAGNOSIS — Z12.31 VISIT FOR SCREENING MAMMOGRAM: Primary | ICD-10-CM

## 2023-11-21 PROBLEM — I74.2: Status: RESOLVED | Noted: 2023-05-23 | Resolved: 2023-11-21

## 2023-11-21 PROBLEM — R63.5 ABNORMAL WEIGHT GAIN: Status: RESOLVED | Noted: 2019-10-16 | Resolved: 2023-11-21

## 2023-11-21 LAB
25(OH)D3 SERPL-MCNC: 24 NG/ML (ref 30–100)
ALBUMIN SERPL BCP-MCNC: 4.4 G/DL (ref 3.4–5)
ALP SERPL-CCNC: 61 U/L (ref 33–110)
ALT SERPL W P-5'-P-CCNC: 12 U/L (ref 7–45)
ANION GAP SERPL CALC-SCNC: 12 MMOL/L (ref 10–20)
AST SERPL W P-5'-P-CCNC: 11 U/L (ref 9–39)
BILIRUB SERPL-MCNC: 0.5 MG/DL (ref 0–1.2)
BUN SERPL-MCNC: 16 MG/DL (ref 6–23)
CALCIUM SERPL-MCNC: 9.3 MG/DL (ref 8.6–10.3)
CHLORIDE SERPL-SCNC: 105 MMOL/L (ref 98–107)
CHOLEST SERPL-MCNC: 110 MG/DL (ref 0–199)
CHOLESTEROL/HDL RATIO: 2.9
CO2 SERPL-SCNC: 29 MMOL/L (ref 21–32)
CREAT SERPL-MCNC: 0.78 MG/DL (ref 0.5–1.05)
EST. AVERAGE GLUCOSE BLD GHB EST-MCNC: 140 MG/DL
GFR SERPL CREATININE-BSD FRML MDRD: 89 ML/MIN/1.73M*2
GLUCOSE SERPL-MCNC: 92 MG/DL (ref 74–99)
HBA1C MFR BLD: 6.5 %
HDLC SERPL-MCNC: 38.5 MG/DL
LDLC SERPL CALC-MCNC: 41 MG/DL
NON HDL CHOLESTEROL: 72 MG/DL (ref 0–149)
POTASSIUM SERPL-SCNC: 4.5 MMOL/L (ref 3.5–5.3)
PROT SERPL-MCNC: 6.6 G/DL (ref 6.4–8.2)
SODIUM SERPL-SCNC: 141 MMOL/L (ref 136–145)
TRIGL SERPL-MCNC: 154 MG/DL (ref 0–149)
VLDL: 31 MG/DL (ref 0–40)

## 2023-11-21 PROCEDURE — 80061 LIPID PANEL: CPT

## 2023-11-21 PROCEDURE — 1036F TOBACCO NON-USER: CPT | Performed by: CLINICAL NURSE SPECIALIST

## 2023-11-21 PROCEDURE — 83036 HEMOGLOBIN GLYCOSYLATED A1C: CPT

## 2023-11-21 PROCEDURE — 99214 OFFICE O/P EST MOD 30 MIN: CPT | Performed by: CLINICAL NURSE SPECIALIST

## 2023-11-21 PROCEDURE — 3080F DIAST BP >= 90 MM HG: CPT | Performed by: CLINICAL NURSE SPECIALIST

## 2023-11-21 PROCEDURE — 82306 VITAMIN D 25 HYDROXY: CPT

## 2023-11-21 PROCEDURE — 3044F HG A1C LEVEL LT 7.0%: CPT | Performed by: CLINICAL NURSE SPECIALIST

## 2023-11-21 PROCEDURE — 4010F ACE/ARB THERAPY RXD/TAKEN: CPT | Performed by: CLINICAL NURSE SPECIALIST

## 2023-11-21 PROCEDURE — 3075F SYST BP GE 130 - 139MM HG: CPT | Performed by: CLINICAL NURSE SPECIALIST

## 2023-11-21 PROCEDURE — 80053 COMPREHEN METABOLIC PANEL: CPT

## 2023-11-21 PROCEDURE — 36415 COLL VENOUS BLD VENIPUNCTURE: CPT

## 2023-11-21 ASSESSMENT — ENCOUNTER SYMPTOMS
BACK PAIN: 0
FATIGUE: 0
MYALGIAS: 0
CONSTIPATION: 0
WOUND: 0
ABDOMINAL PAIN: 0
BRUISES/BLEEDS EASILY: 0
CHEST TIGHTNESS: 0
DYSURIA: 0
POLYDIPSIA: 0
SEIZURES: 0
SLEEP DISTURBANCE: 0
ARTHRALGIAS: 1
LOSS OF SENSATION IN FEET: 0
UNEXPECTED WEIGHT CHANGE: 0
HEADACHES: 0
VOMITING: 0
CONFUSION: 0
FEVER: 0
DIZZINESS: 0
BLOOD IN STOOL: 0
PHOTOPHOBIA: 0
TROUBLE SWALLOWING: 0
WHEEZING: 0
ACTIVITY CHANGE: 0
EYE PAIN: 0
FLANK PAIN: 0
DIARRHEA: 0
SHORTNESS OF BREATH: 0
NECK PAIN: 0
COUGH: 0
OCCASIONAL FEELINGS OF UNSTEADINESS: 0
CHILLS: 0
APPETITE CHANGE: 0
HEMATURIA: 0
JOINT SWELLING: 0
PALPITATIONS: 0
SORE THROAT: 0
DEPRESSION: 0
NAUSEA: 0

## 2023-11-21 ASSESSMENT — COLUMBIA-SUICIDE SEVERITY RATING SCALE - C-SSRS
1. IN THE PAST MONTH, HAVE YOU WISHED YOU WERE DEAD OR WISHED YOU COULD GO TO SLEEP AND NOT WAKE UP?: NO
2. HAVE YOU ACTUALLY HAD ANY THOUGHTS OF KILLING YOURSELF?: NO
6. HAVE YOU EVER DONE ANYTHING, STARTED TO DO ANYTHING, OR PREPARED TO DO ANYTHING TO END YOUR LIFE?: NO

## 2023-11-21 NOTE — PROGRESS NOTES
"Subjective   Patient ID: Nguyen Ponce is a 58 y.o. female who presents for Follow-up (Follow up).  HPI    Here today as a follow up appointment.      Following with Dr. Mari for Cardiology, diagnosed with Heart failure in 2021. Hypertension. Long standing history of Hypertension, uncontrolled. Medications now adjusted by Cardiology. Cardiac MRI and ECHO in December. Monitoring her blood pressure at home, states that it will fluctuate. Recent OV. Declined adjustments to her blood pressure today, states that she will continue to monitor it at home.      Complaints of Arthritis. Previously followed with Rheumatology, not interested in following back with them. Using Two Tylenol ES daily. Some improvement after using medication. Did not tolerate Celebrex. Right hand dominant. Worse with getting up in the AM and picking anything up in her hand. \"Tender.\" Feels that it extends down into her hand. DId not have a good response with Rheumatologist in the past.      Neck pain has improved. Able to improve her ROM. Pain down into the Buttocks, radiating down.      COPD. Following with Milena DUFFY for Pulmonology. Recent Inhaler adjustment.      Patient states that she has chronic issues with Fatigue, cardiac medications adjusted by Cardiology.      Last lab work indicated Diabetes. Has been making some dietary changes to help with her numbers. Does not monitor her blood sugars at home. Started on Metformin, no longer tolerating medication. GI, increased gas. No improvement with XR. Has not tried any other medications besides the Metformin in the past.  Patient states that she has been having increased abdominal pain and bloating. Started worsening about 2 months ago, decreased Bread intake. Started eating Kalia bread. Planning Colonoscopy.  Changes in her stool. Harder to pass stools. Tolerating Trulicity better, would like to continue current medication. Due for updated lab work.      Review of Systems   Constitutional:  " Negative for activity change, appetite change, chills, fatigue, fever and unexpected weight change.   HENT:  Negative for ear pain, hearing loss, nosebleeds, sore throat, tinnitus and trouble swallowing.    Eyes:  Negative for photophobia, pain and visual disturbance.   Respiratory:  Negative for cough, chest tightness, shortness of breath and wheezing.    Cardiovascular:  Negative for chest pain, palpitations and leg swelling.   Gastrointestinal:  Negative for abdominal pain, blood in stool, constipation, diarrhea, nausea and vomiting.   Endocrine: Negative for cold intolerance, heat intolerance, polydipsia and polyuria.   Genitourinary:  Negative for dysuria, flank pain and hematuria.   Musculoskeletal:  Positive for arthralgias. Negative for back pain, joint swelling, myalgias and neck pain.   Skin:  Negative for pallor, rash and wound.   Allergic/Immunologic: Negative for immunocompromised state.   Neurological:  Negative for dizziness, seizures and headaches.   Hematological:  Does not bruise/bleed easily.   Psychiatric/Behavioral:  Negative for confusion and sleep disturbance.        Objective   Physical Exam  Vitals and nursing note reviewed.   Constitutional:       General: She is not in acute distress.     Appearance: Normal appearance.   HENT:      Head: Normocephalic.      Nose: Nose normal.   Eyes:      Conjunctiva/sclera: Conjunctivae normal.   Neck:      Vascular: No carotid bruit.   Cardiovascular:      Rate and Rhythm: Normal rate and regular rhythm.      Pulses: Normal pulses.      Heart sounds: Normal heart sounds.   Pulmonary:      Effort: Pulmonary effort is normal.      Breath sounds: Normal breath sounds.   Abdominal:      General: Bowel sounds are normal.      Palpations: Abdomen is soft.   Musculoskeletal:         General: Normal range of motion.      Cervical back: Normal range of motion.   Skin:     General: Skin is warm and dry.   Neurological:      Mental Status: She is alert and oriented  to person, place, and time. Mental status is at baseline.   Psychiatric:         Mood and Affect: Mood normal.         Behavior: Behavior normal.       Assessment/Plan        Reviewed recent Specialist appointments with patient.      Cardiomyopathy, Hypertension, Heart Failure, Palpitations: Following with Cardiology. Continue medications as prescribed by Cardiology.   COPD: Following with Pulmonology for management.   Obesity: BMI: 39.27. Lifestyle changes recommended: Diet consisting of low fat foods, lean meats, high fiber, fresh fruits and vegetables. 150 min/ weekly aerobic exercise.  Diabetes: Last A1C 6.9%. Lifestyle changes as noted above. Discontinued Metformin, due to tolerance. Monitor blood sugar readings. Call with blood sugar readings. Declined referral for Nutrition at this time. Continue Trulicity. Updated lab work ordered.   Joint Pain: Continue Tylenol.   Depression/Anxiety: Counseling information provided for patient at last OV.   Vitamin D Deficiency: Vitamin D.      COVID Vaccine: May/June 2021. Discussed Booster.   Mammogram: December 2021. Ordered for 2023.   Colonoscopy: 2011. Colonoscopy ordered.   Unable to update immunizations due to Insurance.   LDCT: October 2023, plan to repeat in 1 year.

## 2023-11-22 ENCOUNTER — TELEPHONE (OUTPATIENT)
Dept: PRIMARY CARE | Facility: CLINIC | Age: 58
End: 2023-11-22
Payer: COMMERCIAL

## 2023-11-22 DIAGNOSIS — E11.65 TYPE 2 DIABETES MELLITUS WITH HYPERGLYCEMIA, WITHOUT LONG-TERM CURRENT USE OF INSULIN (MULTI): ICD-10-CM

## 2023-11-22 DIAGNOSIS — E55.9 VITAMIN D DEFICIENCY: ICD-10-CM

## 2023-11-22 DIAGNOSIS — E78.2 MIXED HYPERLIPIDEMIA: ICD-10-CM

## 2023-11-22 NOTE — TELEPHONE ENCOUNTER
----- Message from CATALINA Duenas-CNS sent at 11/21/2023  9:59 PM EST -----  Please call patient with lab results. Vitamin D Is lower. Start/increase Vitamin D. Cholesterol is much better! A1C has also improved to 6.5%. Ok to continue current dosage of Trulicity. Remaining blood work is stable. Plan to repeat all lab work prior to follow up appointment. Thank you!

## 2023-11-25 ENCOUNTER — HOSPITAL ENCOUNTER (OUTPATIENT)
Facility: HOSPITAL | Age: 58
Setting detail: OBSERVATION
LOS: 2 days | Discharge: HOME | End: 2023-11-28
Attending: STUDENT IN AN ORGANIZED HEALTH CARE EDUCATION/TRAINING PROGRAM | Admitting: INTERNAL MEDICINE
Payer: COMMERCIAL

## 2023-11-25 ENCOUNTER — APPOINTMENT (OUTPATIENT)
Dept: RADIOLOGY | Facility: HOSPITAL | Age: 58
End: 2023-11-25
Payer: COMMERCIAL

## 2023-11-25 DIAGNOSIS — K52.9 COLITIS: ICD-10-CM

## 2023-11-25 DIAGNOSIS — R09.89 OTHER SPECIFIED SYMPTOMS AND SIGNS INVOLVING THE CIRCULATORY AND RESPIRATORY SYSTEMS: ICD-10-CM

## 2023-11-25 DIAGNOSIS — K92.2 GASTROINTESTINAL HEMORRHAGE, UNSPECIFIED GASTROINTESTINAL HEMORRHAGE TYPE: ICD-10-CM

## 2023-11-25 DIAGNOSIS — M79.89 OTHER SPECIFIED SOFT TISSUE DISORDERS: ICD-10-CM

## 2023-11-25 DIAGNOSIS — E11.65 TYPE 2 DIABETES MELLITUS WITH HYPERGLYCEMIA, WITHOUT LONG-TERM CURRENT USE OF INSULIN (MULTI): ICD-10-CM

## 2023-11-25 DIAGNOSIS — K52.9 ACUTE COLITIS: Primary | ICD-10-CM

## 2023-11-25 LAB
ABO GROUP (TYPE) IN BLOOD: NORMAL
ABO GROUP (TYPE) IN BLOOD: NORMAL
ALBUMIN SERPL BCP-MCNC: 4.9 G/DL (ref 3.4–5)
ALP SERPL-CCNC: 72 U/L (ref 33–110)
ALT SERPL W P-5'-P-CCNC: 14 U/L (ref 7–45)
ANION GAP SERPL CALC-SCNC: 16 MMOL/L (ref 10–20)
ANTIBODY SCREEN: NORMAL
APPEARANCE UR: CLEAR
AST SERPL W P-5'-P-CCNC: 14 U/L (ref 9–39)
BASOPHILS # BLD AUTO: 0.09 X10*3/UL (ref 0–0.1)
BASOPHILS NFR BLD AUTO: 0.4 %
BB ANTIBODY IDENTIFICATION: NORMAL
BILIRUB SERPL-MCNC: 0.6 MG/DL (ref 0–1.2)
BILIRUB UR STRIP.AUTO-MCNC: NEGATIVE MG/DL
BUN SERPL-MCNC: 39 MG/DL (ref 6–23)
C DIF TOX TCDA+TCDB STL QL NAA+PROBE: NOT DETECTED
CALCIUM SERPL-MCNC: 10.4 MG/DL (ref 8.6–10.3)
CASE #: NORMAL
CHLORIDE SERPL-SCNC: 95 MMOL/L (ref 98–107)
CO2 SERPL-SCNC: 30 MMOL/L (ref 21–32)
COLOR UR: YELLOW
CREAT SERPL-MCNC: 1.17 MG/DL (ref 0.5–1.05)
EOSINOPHIL # BLD AUTO: 0.02 X10*3/UL (ref 0–0.7)
EOSINOPHIL NFR BLD AUTO: 0.1 %
ERYTHROCYTE [DISTWIDTH] IN BLOOD BY AUTOMATED COUNT: 12.7 % (ref 11.5–14.5)
GFR SERPL CREATININE-BSD FRML MDRD: 55 ML/MIN/1.73M*2
GLUCOSE BLD MANUAL STRIP-MCNC: 118 MG/DL (ref 74–99)
GLUCOSE BLD MANUAL STRIP-MCNC: 149 MG/DL (ref 74–99)
GLUCOSE SERPL-MCNC: 129 MG/DL (ref 74–99)
GLUCOSE UR STRIP.AUTO-MCNC: NEGATIVE MG/DL
HCT VFR BLD AUTO: 50.7 % (ref 36–46)
HEMOCCULT STL QL IA: POSITIVE
HGB BLD-MCNC: 17 G/DL (ref 12–16)
IMM GRANULOCYTES # BLD AUTO: 0.12 X10*3/UL (ref 0–0.7)
IMM GRANULOCYTES NFR BLD AUTO: 0.5 % (ref 0–0.9)
INR PPP: 1 (ref 0.9–1.1)
KETONES UR STRIP.AUTO-MCNC: NEGATIVE MG/DL
LACTATE SERPL-SCNC: 1.9 MMOL/L (ref 0.4–2)
LEUKOCYTE ESTERASE UR QL STRIP.AUTO: NEGATIVE
LIPASE SERPL-CCNC: 14 U/L (ref 9–82)
LYMPHOCYTES # BLD AUTO: 1.99 X10*3/UL (ref 1.2–4.8)
LYMPHOCYTES NFR BLD AUTO: 7.9 %
MCH RBC QN AUTO: 34.4 PG (ref 26–34)
MCHC RBC AUTO-ENTMCNC: 33.5 G/DL (ref 32–36)
MCV RBC AUTO: 103 FL (ref 80–100)
MONOCYTES # BLD AUTO: 1.55 X10*3/UL (ref 0.1–1)
MONOCYTES NFR BLD AUTO: 6.1 %
NEUTROPHILS # BLD AUTO: 21.58 X10*3/UL (ref 1.2–7.7)
NEUTROPHILS NFR BLD AUTO: 85 %
NITRITE UR QL STRIP.AUTO: NEGATIVE
NRBC BLD-RTO: 0 /100 WBCS (ref 0–0)
PH UR STRIP.AUTO: 5 [PH]
PLATELET # BLD AUTO: 350 X10*3/UL (ref 150–450)
POTASSIUM SERPL-SCNC: 3.6 MMOL/L (ref 3.5–5.3)
PROT SERPL-MCNC: 7.8 G/DL (ref 6.4–8.2)
PROT UR STRIP.AUTO-MCNC: NEGATIVE MG/DL
PROTHROMBIN TIME: 11.5 SECONDS (ref 9.8–12.8)
RBC # BLD AUTO: 4.94 X10*6/UL (ref 4–5.2)
RBC # UR STRIP.AUTO: ABNORMAL /UL
RBC #/AREA URNS AUTO: NORMAL /HPF
RH FACTOR (ANTIGEN D): NORMAL
RH FACTOR (ANTIGEN D): NORMAL
SODIUM SERPL-SCNC: 137 MMOL/L (ref 136–145)
SP GR UR STRIP.AUTO: 1.03
SQUAMOUS #/AREA URNS AUTO: NORMAL /HPF
UROBILINOGEN UR STRIP.AUTO-MCNC: <2 MG/DL
WBC # BLD AUTO: 25.4 X10*3/UL (ref 4.4–11.3)
WBC #/AREA URNS AUTO: NORMAL /HPF

## 2023-11-25 PROCEDURE — 74177 CT ABD & PELVIS W/CONTRAST: CPT | Mod: FOREIGN READ | Performed by: RADIOLOGY

## 2023-11-25 PROCEDURE — 82274 ASSAY TEST FOR BLOOD FECAL: CPT | Performed by: STUDENT IN AN ORGANIZED HEALTH CARE EDUCATION/TRAINING PROGRAM

## 2023-11-25 PROCEDURE — 81001 URINALYSIS AUTO W/SCOPE: CPT | Performed by: NURSE PRACTITIONER

## 2023-11-25 PROCEDURE — 99221 1ST HOSP IP/OBS SF/LOW 40: CPT | Performed by: INTERNAL MEDICINE

## 2023-11-25 PROCEDURE — 86870 RBC ANTIBODY IDENTIFICATION: CPT | Mod: 59

## 2023-11-25 PROCEDURE — 94640 AIRWAY INHALATION TREATMENT: CPT

## 2023-11-25 PROCEDURE — 2500000004 HC RX 250 GENERAL PHARMACY W/ HCPCS (ALT 636 FOR OP/ED): Performed by: INTERNAL MEDICINE

## 2023-11-25 PROCEDURE — 1210000001 HC SEMI-PRIVATE ROOM DAILY

## 2023-11-25 PROCEDURE — 86905 BLOOD TYPING RBC ANTIGENS: CPT

## 2023-11-25 PROCEDURE — 2550000001 HC RX 255 CONTRASTS: Performed by: NURSE PRACTITIONER

## 2023-11-25 PROCEDURE — 96375 TX/PRO/DX INJ NEW DRUG ADDON: CPT

## 2023-11-25 PROCEDURE — 80053 COMPREHEN METABOLIC PANEL: CPT | Performed by: NURSE PRACTITIONER

## 2023-11-25 PROCEDURE — C9113 INJ PANTOPRAZOLE SODIUM, VIA: HCPCS | Performed by: NURSE PRACTITIONER

## 2023-11-25 PROCEDURE — 96361 HYDRATE IV INFUSION ADD-ON: CPT

## 2023-11-25 PROCEDURE — 85610 PROTHROMBIN TIME: CPT | Performed by: NURSE PRACTITIONER

## 2023-11-25 PROCEDURE — 86902 BLOOD TYPE ANTIGEN DONOR EA: CPT

## 2023-11-25 PROCEDURE — 87506 IADNA-DNA/RNA PROBE TQ 6-11: CPT | Mod: PORLAB | Performed by: INTERNAL MEDICINE

## 2023-11-25 PROCEDURE — 83690 ASSAY OF LIPASE: CPT | Performed by: NURSE PRACTITIONER

## 2023-11-25 PROCEDURE — 99285 EMERGENCY DEPT VISIT HI MDM: CPT | Mod: 25 | Performed by: STUDENT IN AN ORGANIZED HEALTH CARE EDUCATION/TRAINING PROGRAM

## 2023-11-25 PROCEDURE — 36415 COLL VENOUS BLD VENIPUNCTURE: CPT | Performed by: NURSE PRACTITIONER

## 2023-11-25 PROCEDURE — 87493 C DIFF AMPLIFIED PROBE: CPT | Performed by: INTERNAL MEDICINE

## 2023-11-25 PROCEDURE — 86922 COMPATIBILITY TEST ANTIGLOB: CPT

## 2023-11-25 PROCEDURE — 96376 TX/PRO/DX INJ SAME DRUG ADON: CPT

## 2023-11-25 PROCEDURE — 82947 ASSAY GLUCOSE BLOOD QUANT: CPT

## 2023-11-25 PROCEDURE — 83605 ASSAY OF LACTIC ACID: CPT | Performed by: NURSE PRACTITIONER

## 2023-11-25 PROCEDURE — 74177 CT ABD & PELVIS W/CONTRAST: CPT

## 2023-11-25 PROCEDURE — 36415 COLL VENOUS BLD VENIPUNCTURE: CPT | Performed by: STUDENT IN AN ORGANIZED HEALTH CARE EDUCATION/TRAINING PROGRAM

## 2023-11-25 PROCEDURE — 85025 COMPLETE CBC W/AUTO DIFF WBC: CPT | Performed by: NURSE PRACTITIONER

## 2023-11-25 PROCEDURE — 96367 TX/PROPH/DG ADDL SEQ IV INF: CPT

## 2023-11-25 PROCEDURE — 2500000004 HC RX 250 GENERAL PHARMACY W/ HCPCS (ALT 636 FOR OP/ED): Performed by: NURSE PRACTITIONER

## 2023-11-25 PROCEDURE — 99223 1ST HOSP IP/OBS HIGH 75: CPT | Performed by: INTERNAL MEDICINE

## 2023-11-25 PROCEDURE — 96365 THER/PROPH/DIAG IV INF INIT: CPT

## 2023-11-25 PROCEDURE — 2500000001 HC RX 250 WO HCPCS SELF ADMINISTERED DRUGS (ALT 637 FOR MEDICARE OP): Performed by: INTERNAL MEDICINE

## 2023-11-25 PROCEDURE — 2500000004 HC RX 250 GENERAL PHARMACY W/ HCPCS (ALT 636 FOR OP/ED): Performed by: STUDENT IN AN ORGANIZED HEALTH CARE EDUCATION/TRAINING PROGRAM

## 2023-11-25 PROCEDURE — 83993 ASSAY FOR CALPROTECTIN FECAL: CPT | Performed by: INTERNAL MEDICINE

## 2023-11-25 PROCEDURE — 86901 BLOOD TYPING SEROLOGIC RH(D): CPT | Performed by: STUDENT IN AN ORGANIZED HEALTH CARE EDUCATION/TRAINING PROGRAM

## 2023-11-25 RX ORDER — ATORVASTATIN CALCIUM 20 MG/1
20 TABLET, FILM COATED ORAL NIGHTLY
Status: DISCONTINUED | OUTPATIENT
Start: 2023-11-25 | End: 2023-11-28 | Stop reason: HOSPADM

## 2023-11-25 RX ORDER — MORPHINE SULFATE 4 MG/ML
4 INJECTION, SOLUTION INTRAMUSCULAR; INTRAVENOUS ONCE
Status: COMPLETED | OUTPATIENT
Start: 2023-11-25 | End: 2023-11-25

## 2023-11-25 RX ORDER — SODIUM CHLORIDE 0.9 % (FLUSH) 0.9 %
SYRINGE (ML) INJECTION
Status: COMPLETED
Start: 2023-11-25 | End: 2023-11-25

## 2023-11-25 RX ORDER — OXYCODONE HYDROCHLORIDE 5 MG/1
5 TABLET ORAL EVERY 4 HOURS PRN
Status: DISCONTINUED | OUTPATIENT
Start: 2023-11-25 | End: 2023-11-28 | Stop reason: HOSPADM

## 2023-11-25 RX ORDER — CEFTRIAXONE 1 G/50ML
1 INJECTION, SOLUTION INTRAVENOUS EVERY 24 HOURS
Status: DISCONTINUED | OUTPATIENT
Start: 2023-11-25 | End: 2023-11-28 | Stop reason: HOSPADM

## 2023-11-25 RX ORDER — TALC
3 POWDER (GRAM) TOPICAL DAILY
Status: DISCONTINUED | OUTPATIENT
Start: 2023-11-25 | End: 2023-11-28 | Stop reason: HOSPADM

## 2023-11-25 RX ORDER — DEXTROSE MONOHYDRATE 100 MG/ML
0.3 INJECTION, SOLUTION INTRAVENOUS ONCE AS NEEDED
Status: DISCONTINUED | OUTPATIENT
Start: 2023-11-25 | End: 2023-11-28 | Stop reason: HOSPADM

## 2023-11-25 RX ORDER — ONDANSETRON HYDROCHLORIDE 2 MG/ML
4 INJECTION, SOLUTION INTRAVENOUS EVERY 8 HOURS PRN
Status: DISCONTINUED | OUTPATIENT
Start: 2023-11-25 | End: 2023-11-28 | Stop reason: HOSPADM

## 2023-11-25 RX ORDER — ONDANSETRON HYDROCHLORIDE 2 MG/ML
4 INJECTION, SOLUTION INTRAVENOUS ONCE
Status: COMPLETED | OUTPATIENT
Start: 2023-11-25 | End: 2023-11-25

## 2023-11-25 RX ORDER — ACETAMINOPHEN 160 MG/5ML
650 SOLUTION ORAL EVERY 4 HOURS PRN
Status: DISCONTINUED | OUTPATIENT
Start: 2023-11-25 | End: 2023-11-28 | Stop reason: HOSPADM

## 2023-11-25 RX ORDER — DEXTROSE 50 % IN WATER (D50W) INTRAVENOUS SYRINGE
25
Status: DISCONTINUED | OUTPATIENT
Start: 2023-11-25 | End: 2023-11-28 | Stop reason: HOSPADM

## 2023-11-25 RX ORDER — FLUTICASONE FUROATE AND VILANTEROL 200; 25 UG/1; UG/1
1 POWDER RESPIRATORY (INHALATION)
Status: DISCONTINUED | OUTPATIENT
Start: 2023-11-25 | End: 2023-11-26

## 2023-11-25 RX ORDER — MONTELUKAST SODIUM 10 MG/1
10 TABLET ORAL NIGHTLY
Status: DISCONTINUED | OUTPATIENT
Start: 2023-11-25 | End: 2023-11-28 | Stop reason: HOSPADM

## 2023-11-25 RX ORDER — ALBUTEROL SULFATE 90 UG/1
2 AEROSOL, METERED RESPIRATORY (INHALATION) EVERY 4 HOURS PRN
Status: DISCONTINUED | OUTPATIENT
Start: 2023-11-25 | End: 2023-11-28 | Stop reason: HOSPADM

## 2023-11-25 RX ORDER — POLYETHYLENE GLYCOL 3350 17 G/17G
17 POWDER, FOR SOLUTION ORAL DAILY PRN
Status: DISCONTINUED | OUTPATIENT
Start: 2023-11-25 | End: 2023-11-28 | Stop reason: HOSPADM

## 2023-11-25 RX ORDER — OXYCODONE HYDROCHLORIDE 5 MG/1
7.5 TABLET ORAL EVERY 4 HOURS PRN
Status: DISCONTINUED | OUTPATIENT
Start: 2023-11-25 | End: 2023-11-28 | Stop reason: HOSPADM

## 2023-11-25 RX ORDER — LORATADINE 10 MG/1
10 TABLET ORAL DAILY
Status: DISCONTINUED | OUTPATIENT
Start: 2023-11-25 | End: 2023-11-28 | Stop reason: HOSPADM

## 2023-11-25 RX ORDER — ONDANSETRON 4 MG/1
4 TABLET, FILM COATED ORAL EVERY 8 HOURS PRN
Status: DISCONTINUED | OUTPATIENT
Start: 2023-11-25 | End: 2023-11-28 | Stop reason: HOSPADM

## 2023-11-25 RX ORDER — FLUTICASONE PROPIONATE 50 MCG
2 SPRAY, SUSPENSION (ML) NASAL 2 TIMES DAILY
Status: DISCONTINUED | OUTPATIENT
Start: 2023-11-25 | End: 2023-11-28 | Stop reason: HOSPADM

## 2023-11-25 RX ORDER — PANTOPRAZOLE SODIUM 40 MG/10ML
40 INJECTION, POWDER, LYOPHILIZED, FOR SOLUTION INTRAVENOUS ONCE
Status: COMPLETED | OUTPATIENT
Start: 2023-11-25 | End: 2023-11-25

## 2023-11-25 RX ORDER — ACETAMINOPHEN 650 MG/1
650 SUPPOSITORY RECTAL EVERY 4 HOURS PRN
Status: DISCONTINUED | OUTPATIENT
Start: 2023-11-25 | End: 2023-11-28 | Stop reason: HOSPADM

## 2023-11-25 RX ORDER — ACETAMINOPHEN 325 MG/1
650 TABLET ORAL EVERY 4 HOURS PRN
Status: DISCONTINUED | OUTPATIENT
Start: 2023-11-25 | End: 2023-11-28 | Stop reason: HOSPADM

## 2023-11-25 RX ORDER — METRONIDAZOLE 500 MG/100ML
500 INJECTION, SOLUTION INTRAVENOUS EVERY 8 HOURS
Status: DISCONTINUED | OUTPATIENT
Start: 2023-11-25 | End: 2023-11-28 | Stop reason: HOSPADM

## 2023-11-25 RX ORDER — INSULIN LISPRO 100 [IU]/ML
0-10 INJECTION, SOLUTION INTRAVENOUS; SUBCUTANEOUS
Status: DISCONTINUED | OUTPATIENT
Start: 2023-11-25 | End: 2023-11-28 | Stop reason: HOSPADM

## 2023-11-25 RX ORDER — CARVEDILOL 12.5 MG/1
12.5 TABLET ORAL
Status: DISCONTINUED | OUTPATIENT
Start: 2023-11-25 | End: 2023-11-28 | Stop reason: HOSPADM

## 2023-11-25 RX ORDER — IBUPROFEN 200 MG
600 TABLET ORAL NIGHTLY
COMMUNITY
End: 2024-04-09 | Stop reason: WASHOUT

## 2023-11-25 RX ADMIN — PANTOPRAZOLE SODIUM 40 MG: 40 INJECTION, POWDER, FOR SOLUTION INTRAVENOUS at 07:15

## 2023-11-25 RX ADMIN — ONDANSETRON 4 MG: 2 INJECTION INTRAMUSCULAR; INTRAVENOUS at 06:54

## 2023-11-25 RX ADMIN — MORPHINE SULFATE 4 MG: 4 INJECTION, SOLUTION INTRAMUSCULAR; INTRAVENOUS at 06:54

## 2023-11-25 RX ADMIN — CARVEDILOL 12.5 MG: 12.5 TABLET, FILM COATED ORAL at 16:21

## 2023-11-25 RX ADMIN — IOHEXOL 75 ML: 350 INJECTION, SOLUTION INTRAVENOUS at 08:20

## 2023-11-25 RX ADMIN — METRONIDAZOLE 500 MG: 500 INJECTION, SOLUTION INTRAVENOUS at 17:45

## 2023-11-25 RX ADMIN — Medication 3 MG: at 20:48

## 2023-11-25 RX ADMIN — HYDROMORPHONE HYDROCHLORIDE 0.5 MG: 0.5 INJECTION, SOLUTION INTRAMUSCULAR; INTRAVENOUS; SUBCUTANEOUS at 08:06

## 2023-11-25 RX ADMIN — ATORVASTATIN CALCIUM 20 MG: 20 TABLET, FILM COATED ORAL at 20:46

## 2023-11-25 RX ADMIN — ONDANSETRON 4 MG: 2 INJECTION INTRAMUSCULAR; INTRAVENOUS at 08:06

## 2023-11-25 RX ADMIN — CEFTRIAXONE SODIUM 1 G: 1 INJECTION, SOLUTION INTRAVENOUS at 18:00

## 2023-11-25 RX ADMIN — PIPERACILLIN SODIUM AND TAZOBACTAM SODIUM 3.38 G: 3; .375 INJECTION, SOLUTION INTRAVENOUS at 08:30

## 2023-11-25 RX ADMIN — Medication 10 ML: at 17:30

## 2023-11-25 RX ADMIN — SODIUM CHLORIDE 1000 ML: 9 INJECTION, SOLUTION INTRAVENOUS at 06:54

## 2023-11-25 RX ADMIN — HYDROMORPHONE HYDROCHLORIDE 0.5 MG: 0.5 INJECTION, SOLUTION INTRAMUSCULAR; INTRAVENOUS; SUBCUTANEOUS at 13:41

## 2023-11-25 RX ADMIN — LORATADINE 10 MG: 10 TABLET ORAL at 16:21

## 2023-11-25 RX ADMIN — OXYCODONE HYDROCHLORIDE 7.5 MG: 5 TABLET ORAL at 20:46

## 2023-11-25 RX ADMIN — OXYCODONE HYDROCHLORIDE 7.5 MG: 5 TABLET ORAL at 13:40

## 2023-11-25 SDOH — SOCIAL STABILITY: SOCIAL INSECURITY: WERE YOU ABLE TO COMPLETE ALL THE BEHAVIORAL HEALTH SCREENINGS?: YES

## 2023-11-25 SDOH — SOCIAL STABILITY: SOCIAL INSECURITY: HAVE YOU HAD THOUGHTS OF HARMING ANYONE ELSE?: NO

## 2023-11-25 SDOH — SOCIAL STABILITY: SOCIAL INSECURITY: ABUSE: ADULT

## 2023-11-25 ASSESSMENT — PAIN SCALES - GENERAL
PAINLEVEL_OUTOF10: 9
PAINLEVEL_OUTOF10: 8
PAINLEVEL_OUTOF10: 10 - WORST POSSIBLE PAIN
PAINLEVEL_OUTOF10: 1
PAINLEVEL_OUTOF10: 10 - WORST POSSIBLE PAIN
PAINLEVEL_OUTOF10: 0 - NO PAIN

## 2023-11-25 ASSESSMENT — ENCOUNTER SYMPTOMS
COUGH: 0
VOMITING: 1
CONFUSION: 0
WEAKNESS: 0
PHOTOPHOBIA: 0
FEVER: 0
DIZZINESS: 0
CHILLS: 0
LIGHT-HEADEDNESS: 0
PALPITATIONS: 0
ABDOMINAL PAIN: 1
SHORTNESS OF BREATH: 0
TREMORS: 0
BLOOD IN STOOL: 1
POLYDIPSIA: 0
NAUSEA: 1
SLEEP DISTURBANCE: 0
HEMATURIA: 0
DIARRHEA: 1
DYSURIA: 0
COLOR CHANGE: 0

## 2023-11-25 ASSESSMENT — COGNITIVE AND FUNCTIONAL STATUS - GENERAL
PATIENT BASELINE BEDBOUND: NO
MOBILITY SCORE: 24
DAILY ACTIVITIY SCORE: 24
DAILY ACTIVITIY SCORE: 24
MOBILITY SCORE: 24

## 2023-11-25 ASSESSMENT — COLUMBIA-SUICIDE SEVERITY RATING SCALE - C-SSRS
6. HAVE YOU EVER DONE ANYTHING, STARTED TO DO ANYTHING, OR PREPARED TO DO ANYTHING TO END YOUR LIFE?: NO
2. HAVE YOU ACTUALLY HAD ANY THOUGHTS OF KILLING YOURSELF?: NO
1. IN THE PAST MONTH, HAVE YOU WISHED YOU WERE DEAD OR WISHED YOU COULD GO TO SLEEP AND NOT WAKE UP?: NO

## 2023-11-25 ASSESSMENT — LIFESTYLE VARIABLES
HOW MANY STANDARD DRINKS CONTAINING ALCOHOL DO YOU HAVE ON A TYPICAL DAY: 1 OR 2
HAVE YOU EVER FELT YOU SHOULD CUT DOWN ON YOUR DRINKING: NO
HOW OFTEN DO YOU HAVE A DRINK CONTAINING ALCOHOL: MONTHLY OR LESS
SKIP TO QUESTIONS 9-10: 1
EVER FELT BAD OR GUILTY ABOUT YOUR DRINKING: NO
AUDIT-C TOTAL SCORE: 1
HOW OFTEN DO YOU HAVE 6 OR MORE DRINKS ON ONE OCCASION: NEVER
HAVE PEOPLE ANNOYED YOU BY CRITICIZING YOUR DRINKING: NO
AUDIT-C TOTAL SCORE: 1
REASON UNABLE TO ASSESS: NO
EVER HAD A DRINK FIRST THING IN THE MORNING TO STEADY YOUR NERVES TO GET RID OF A HANGOVER: NO

## 2023-11-25 ASSESSMENT — ACTIVITIES OF DAILY LIVING (ADL)
WALKS IN HOME: INDEPENDENT
HEARING - LEFT EAR: FUNCTIONAL
LACK_OF_TRANSPORTATION: NO
HEARING - RIGHT EAR: FUNCTIONAL
BATHING: INDEPENDENT
DRESSING YOURSELF: INDEPENDENT
TOILETING: INDEPENDENT
GROOMING: INDEPENDENT
ADEQUATE_TO_COMPLETE_ADL: YES
JUDGMENT_ADEQUATE_SAFELY_COMPLETE_DAILY_ACTIVITIES: YES
FEEDING YOURSELF: INDEPENDENT
PATIENT'S MEMORY ADEQUATE TO SAFELY COMPLETE DAILY ACTIVITIES?: YES

## 2023-11-25 ASSESSMENT — PAIN - FUNCTIONAL ASSESSMENT
PAIN_FUNCTIONAL_ASSESSMENT: 0-10

## 2023-11-25 ASSESSMENT — PATIENT HEALTH QUESTIONNAIRE - PHQ9
SUM OF ALL RESPONSES TO PHQ9 QUESTIONS 1 & 2: 0
1. LITTLE INTEREST OR PLEASURE IN DOING THINGS: NOT AT ALL
2. FEELING DOWN, DEPRESSED OR HOPELESS: NOT AT ALL

## 2023-11-25 ASSESSMENT — PAIN DESCRIPTION - LOCATION: LOCATION: ABDOMEN

## 2023-11-25 NOTE — CONSULTS
"[facility] Consultation Note    ASSESSMENT and PLAN:       Nguyen Ponce is a 58 y.o. female with a significant past medical history of COPD, hyperlipidemia, diabetes mellitus type 2, combined systolic and diastolic CHF who presented with abdominal pain and bloody bowel movements. GI was consulted for \" colitis\".       Abdominal pain/colitis  -Ischemic versus infectious versus inflammatory etiology  -Agree with obtaining stool pathogen panel along with C. difficile EIA and fecal calprotectin for evaluation  -Agree with Cipro and Flagyl for possible ischemic colitis patient's presentation seems consistent with this and with patient's history of tobacco abuse  -Okay for full liquid diet today from GI point of view  -Tentatively plan on colonoscopy 11/27/2023 for evaluation  -Continue with supportive care        Marin Wright DO   Gastroenterology         HISTORY OF PRESENT ILLNESS:   Consult Reason: Colitis    History Of Present Illness:    Nguyen Ponce is a 58 y.o. female with a significant past medical history of COPD, hyperlipidemia, diabetes mellitus type 2, combined diastolic and systolic CHF who presented with abdominal pain and bloody bowel movements. GI was consulted for \" colitis\".    Patient presented to the emergency room this a.m. for main complaint of abdominal cramps next of kin without ability to do so she states she had a bowel movement and had shimon blood that had multiple bloody movements after that prompted her to present to the emergency room for evaluation.    In the ER patient routine lab work patient found to have a significant leukocytosis of 25,000 stable hemoglobin of 17 possibly hemoconcentrated.  Patient with CT of the abdomen pelvis showed left-sided colitis.  GI was consulted from the emergency room.      Patient was seen and evaluated in the emergency room this a.m.  She notes that she had abdominal pain and then bloody bowel movement this a.m.  She denies any " previous presentation for abdominal pain or colitis.  She missed a colonoscopy approximately 11 to 12 years ago denies any findings at that time.  She has not experienced constipation at home.  She denies any ongoing diarrhea over the last several months.                Endoscopic History     Remote history of colonoscopy approximately 10 to 12 years prior    Review of systems:     Patient denies any heartburn/GERD, N/V, dysphagia, odynophagia, constipation, hematemesis,  melena, or weight loss.    I performed a complete 10 point review of systems and it is negative except as noted in HPI or above.    All other systems have been reviewed and are negative.    .    Objective         PAST HISTORIES:       Past Medical History:  She has a past medical history of COPD (chronic obstructive pulmonary disease) (CMS/[facility]), Localized edema (09/14/2021), Lymphedema, not elsewhere classified (06/01/2021), Morbid (severe) obesity due to excess calories (CMS/[facility]) (01/12/2022), Obesity, Obesity, unspecified (01/12/2022), Obesity, unspecified (01/12/2022), Pain in left arm (05/17/2018), Pain in right arm, Personal history of other diseases of the circulatory system, Personal history of other diseases of the circulatory system (05/17/2018), Personal history of other endocrine, nutritional and metabolic disease, Personal history of other endocrine, nutritional and metabolic disease (01/12/2022), Personal history of other endocrine, nutritional and metabolic disease, Personal history of other infectious and parasitic diseases (01/12/2022), Personal history of peptic ulcer disease, Personal history of urinary calculi, and Prediabetes (01/12/2022).    Past Surgical History:  She has a past surgical history that includes Cholecystectomy (05/17/2018); Tonsillectomy (05/17/2018); Other surgical history (03/12/2020); and Cardiac catheterization (August 2021).      Social History:  She reports that she quit smoking about 22 months ago. Her  "smoking use included cigarettes. She has never used smokeless tobacco. She reports that she does not currently use alcohol. She reports that she does not use drugs.    Family History:  No known GI disease, specifically denies pancreatitis, Crohn's, colon cancer, gastroesophageal cancer, or ulcerative colitis.    Family History   Problem Relation Name Age of Onset    Hypertension Mother Ree Marion     Other (palpitations) Mother Ree Marion     Other (palpitations) Sister      Diabetes Daughter      Brain Aneurysm Mother's Sister      Brain Aneurysm Paternal Grandmother      Brain Aneurysm Paternal Grandfather          Allergies:  Patient has no known allergies.      OBJECTIVE:       Last Recorded Vitals:  Vitals:    11/25/23 0924 11/25/23 0926 11/25/23 1012 11/25/23 1019   BP: 114/74  125/79    BP Location:       Patient Position:       Pulse: 81  86    Resp: 20  18    Temp:       TempSrc:       SpO2: (!) 84% 95% 95% 95%   Weight:       Height:         /79   Pulse 86   Temp 36.1 °C (96.9 °F) (Temporal)   Resp 18   Ht 1.651 m (5' 5\")   Wt 107 kg (236 lb)   SpO2 95%   BMI 39.27 kg/m²      Physical Exam:    Physical Exam  Vitals reviewed.   Constitutional:       General: She is awake.      Appearance: Normal appearance. She is obese.   HENT:      Head: Normocephalic.      Mouth/Throat:      Mouth: Mucous membranes are moist.   Eyes:      Extraocular Movements: Extraocular movements intact.   Cardiovascular:      Rate and Rhythm: Normal rate.      Heart sounds: Normal heart sounds.   Pulmonary:      Effort: Pulmonary effort is normal.      Breath sounds: Normal breath sounds.   Abdominal:      Palpations: Abdomen is soft.      Tenderness: There is no abdominal tenderness. There is no guarding or rebound.      Hernia: No hernia is present.   Musculoskeletal:         General: Normal range of motion.      Cervical back: Neck supple.   Skin:     General: Skin is warm and dry.   Neurological:      General: " No focal deficit present.      Mental Status: She is alert.   Psychiatric:         Attention and Perception: Attention and perception normal.         Mood and Affect: Mood normal.         Behavior: Behavior normal.             Inpatient Medications:  cefTRIAXone, 1 g, intravenous, q24h  metroNIDAZOLE, 500 mg, intravenous, q8h          Outpatient Medications:  Prior to Admission medications    Medication Sig Start Date End Date Taking? Authorizing Provider   albuterol 90 mcg/actuation inhaler Inhale 2 puffs every 4 hours if needed for wheezing or shortness of breath. 10/16/23   ROYAL Dela Cruz   atorvastatin (Lipitor) 20 mg tablet TAKE ONE TABLET BY MOUTH AT BEDTIME 11/13/23   HORACE Duenas   budesonide-glycopyr-formoterol (Breztri Aerosphere) 160-9-4.8 mcg/actuation HFA aerosol inhaler Inhale 2 puffs 2 times a day. 10/16/23 10/15/24  ROYAL Dela Cruz   carvedilol (Coreg) 12.5 mg tablet Take 1 tablet (12.5 mg) by mouth 2 times a day with meals.    Historical Provider, MD   dulaglutide (Trulicity) 0.75 mg/0.5 mL pen injector Inject 0.75 mg under the skin 1 (one) time per week. 7/24/23   HORACE Duenas   fluticasone (Flonase) 50 mcg/actuation nasal spray Administer 1 spray into affected nostril(s) 2 times a day. 8/18/21   Historical Provider, MD   furosemide (Lasix) 40 mg tablet TAKE ONE TABLET BY MOUTH DAILY 11/14/23   ROYAL Wang   ibuprofen 800 mg tablet Take 1 tablet (800 mg) by mouth 2 times a day as needed. 8/23/22   Historical Provider, MD   ipratropium-albuteroL (Duo-Neb) 0.5-2.5 mg/3 mL nebulizer solution Inhale 3 mL every 4 hours if needed. 8/18/21   Historical Provider, MD   loratadine (Claritin) 10 mg tablet Take 1 tablet (10 mg) by mouth once daily.    Historical Provider, MD   montelukast (Singulair) 10 mg tablet TAKE ONE TABLET BY MOUTH AT BEDTIME 11/13/23   Milena R Jesse, APRN-CNP   multivitamin tablet Take 1 tablet by mouth once daily.    Historical  "Provider, MD   olmesartan (BENIcar) 40 mg tablet Take 1 tablet (40 mg) by mouth once daily. 10/16/19   Historical Provider, MD   OneTouch Delica Plus Lancet 33 gauge misc use to test once daily 4/17/23   Historical Provider, MD   potassium chloride ER (Micro-K) 10 mEq ER capsule Take 1 capsule (10 mEq) by mouth once daily. 5/8/23   Historical Provider, MD   spironolactone (Aldactone) 25 mg tablet Take 1 half tablet by mouth once daily. 12/28/22   Historical Provider, MD   verapamil ER (Veralan PM) 180 mg 24 hr capsule Take 1 capsule (180 mg) by mouth 2 times a day.  11/21/23  Historical Provider, MD       LABS AND IMAGING:     Last Labs:      Lab Results   Component Value Date    HGB 17.0 (H) 11/25/2023     (H) 11/25/2023     11/25/2023       No results found for: \"FERRITIN\", \"IRON\"    Lab Results   Component Value Date     11/25/2023    K 3.6 11/25/2023    CL 95 (L) 11/25/2023    BUN 39 (H) 11/25/2023    CREATININE 1.17 (H) 11/25/2023       Lab Results   Component Value Date    BILITOT 0.6 11/25/2023     Lab Results   Component Value Date    ALT 14 11/25/2023    AST 14 11/25/2023    ALKPHOS 72 11/25/2023       No results found for: \"CRP\"    No results found for: \"CALPS\"      Imaging Results     CT abdomen pelvis w IV contrast    Result Date: 11/25/2023  STUDY: CT Abdomen and Pelvis with IV Contrast; 11/25/2023 8:24 AM INDICATION: Bloody stools. COMPARISON: CTA chest 07/27/2021. ACCESSION NUMBER(S): [id number] ORDERING CLINICIAN: ISAAC GARCIA TECHNIQUE: CT of the abdomen and pelvis was performed.  Contiguous axial images were obtained at 3 mm slice thickness through the abdomen and pelvis. Coronal and sagittal reconstructions at 3 mm slice thickness were performed.  Omnipaque 350 75 mL was administered intravenously.  FINDINGS: LOWER CHEST: No cardiomegaly.  No pericardial effusion.  Lung bases are clear.  ABDOMEN:  LIVER: No hepatomegaly.  Smooth surface contour.  Normal attenuation.  BILE " DUCTS: There is mild biliary duct dilatation but this is often seen in a postcholecystectomy patient. GALLBLADDER: The gallbladder is absent. STOMACH: No abnormalities identified.  PANCREAS: No masses or ductal dilatation.  SPLEEN: No splenomegaly or focal splenic lesion.  ADRENAL GLANDS: There is thickening of the left adrenal gland most likely representing hyperplasia.  KIDNEYS AND URETERS: Kidneys are normal in size and location.  No renal or ureteral calculi.  PELVIS:  BLADDER: No abnormalities identified.  REPRODUCTIVE ORGANS: No abnormalities identified.  BOWEL: There is diverticulosis.  There is abnormal thickening of the wall the colon with edema.  This begins in the splenic flexure extends to the sigmoid colon.  Although there are diverticula present, this has more the appearance of colitis.  The appendix is identified and is normal.  VESSELS: No abnormalities identified.  Abdominal aorta is normal in caliber.  PERITONEUM/RETROPERITONEUM/LYMPH NODES: No free fluid.  No pneumoperitoneum. No lymphadenopathy.  ABDOMINAL WALL: No abnormalities identified. SOFT TISSUES: No abnormalities identified.  BONES: There are degenerative change with spinal stenosis at L4-5.  No acute fracture or aggressive osseous lesion.    Abnormal thickening of the wall of the colon with edema beginning in the splenic flexure extending to the sigmoid most consistent with colitis. Diverticulosis. Signed by Ronal Marion MD

## 2023-11-25 NOTE — H&P
History Of Present Illness  Nguyen Ponce is a 58 y.o. female with a PMH of DM2, HTN, MDD/LICHA, HLD, COPD, combined systolic and diastolic CHF and Obesity presenting with abdominal pain and BRBPR.     Patient states that on Friday evening she began to experience abdominal cramps and the urge to defecate without the ability to do so. This persisted then eventually patient had a BM that was shimon blood. She then had multiple bloody bowel movements thereafter prompting her to come to the ED. In the ED she was noted to have leukocytosis and CTAP revealed colitis of the L colon and sigmoid colon.      Past Medical History  She has a past medical history of COPD (chronic obstructive pulmonary disease) (CMS/[facility]), Localized edema (09/14/2021), Lymphedema, not elsewhere classified (06/01/2021), Morbid (severe) obesity due to excess calories (CMS/[facility]) (01/12/2022), Obesity, Obesity, unspecified (01/12/2022), Obesity, unspecified (01/12/2022), Pain in left arm (05/17/2018), Pain in right arm, Personal history of other diseases of the circulatory system, Personal history of other diseases of the circulatory system (05/17/2018), Personal history of other endocrine, nutritional and metabolic disease, Personal history of other endocrine, nutritional and metabolic disease (01/12/2022), Personal history of other endocrine, nutritional and metabolic disease, Personal history of other infectious and parasitic diseases (01/12/2022), Personal history of peptic ulcer disease, Personal history of urinary calculi, and Prediabetes (01/12/2022).    Surgical History  She has a past surgical history that includes Cholecystectomy (05/17/2018); Tonsillectomy (05/17/2018); Other surgical history (03/12/2020); and Cardiac catheterization (August 2021).     Social History  She reports that she quit smoking about 22 months ago. Her smoking use included cigarettes. She has never used smokeless tobacco. She reports that she does not currently use  alcohol. She reports that she does not use drugs.    Family History  Family History   Problem Relation Name Age of Onset    Hypertension Mother Ree Marion     Other (palpitations) Mother Ree Marion     Other (palpitations) Sister      Diabetes Daughter      Brain Aneurysm Mother's Sister      Brain Aneurysm Paternal Grandmother      Brain Aneurysm Paternal Grandfather          Allergies  Patient has no known allergies.    Code Status  No Order     Review of Systems   Constitutional:  Negative for chills and fever.   Eyes:  Negative for photophobia and visual disturbance.   Respiratory:  Negative for cough and shortness of breath.    Cardiovascular:  Negative for chest pain, palpitations and leg swelling.   Gastrointestinal:  Positive for abdominal pain, blood in stool, diarrhea, nausea and vomiting.   Endocrine: Negative for polydipsia and polyuria.   Genitourinary:  Negative for decreased urine volume, dysuria, hematuria and urgency.   Skin:  Negative for color change and rash.   Neurological:  Negative for dizziness, tremors, syncope, weakness and light-headedness.   Psychiatric/Behavioral:  Negative for confusion and sleep disturbance.    All other systems reviewed and are negative.      Last Recorded Vitals  /79   Pulse 86   Temp 36.1 °C (96.9 °F) (Temporal)   Resp 18   Wt 107 kg (236 lb)   SpO2 95%      Physical Exam  Vitals and nursing note reviewed.   Constitutional:       General: She is not in acute distress.     Appearance: She is not ill-appearing or toxic-appearing.   HENT:      Head: Normocephalic and atraumatic.   Cardiovascular:      Rate and Rhythm: Normal rate and regular rhythm.      Pulses: Normal pulses.      Heart sounds: Normal heart sounds. No murmur heard.     No friction rub. No gallop.   Pulmonary:      Effort: Pulmonary effort is normal. No respiratory distress.      Breath sounds: No wheezing, rhonchi or rales.   Abdominal:      General: Abdomen is flat. Bowel sounds are  normal. There is no distension.      Palpations: Abdomen is soft.      Tenderness: There is abdominal tenderness. There is no guarding or rebound.      Comments: TTP worst in the LUQ and LLQ   Musculoskeletal:      Cervical back: Neck supple. No rigidity or tenderness.   Skin:     General: Skin is warm and dry.   Neurological:      General: No focal deficit present.   Psychiatric:         Mood and Affect: Mood normal.          Relevant Results  Results for orders placed or performed during the hospital encounter of 11/25/23 (from the past 24 hour(s))   CBC and Auto Differential   Result Value Ref Range    WBC 25.4 (H) 4.4 - 11.3 x10*3/uL    nRBC 0.0 0.0 - 0.0 /100 WBCs    RBC 4.94 4.00 - 5.20 x10*6/uL    Hemoglobin 17.0 (H) 12.0 - 16.0 g/dL    Hematocrit 50.7 (H) 36.0 - 46.0 %     (H) 80 - 100 fL    MCH 34.4 (H) 26.0 - 34.0 pg    MCHC 33.5 32.0 - 36.0 g/dL    RDW 12.7 11.5 - 14.5 %    Platelets 350 150 - 450 x10*3/uL    Neutrophils % 85.0 40.0 - 80.0 %    Immature Granulocytes %, Automated 0.5 0.0 - 0.9 %    Lymphocytes % 7.9 13.0 - 44.0 %    Monocytes % 6.1 2.0 - 10.0 %    Eosinophils % 0.1 0.0 - 6.0 %    Basophils % 0.4 0.0 - 2.0 %    Neutrophils Absolute 21.58 (H) 1.20 - 7.70 x10*3/uL    Immature Granulocytes Absolute, Automated 0.12 0.00 - 0.70 x10*3/uL    Lymphocytes Absolute 1.99 1.20 - 4.80 x10*3/uL    Monocytes Absolute 1.55 (H) 0.10 - 1.00 x10*3/uL    Eosinophils Absolute 0.02 0.00 - 0.70 x10*3/uL    Basophils Absolute 0.09 0.00 - 0.10 x10*3/uL   Comprehensive metabolic panel   Result Value Ref Range    Glucose 129 (H) 74 - 99 mg/dL    Sodium 137 136 - 145 mmol/L    Potassium 3.6 3.5 - 5.3 mmol/L    Chloride 95 (L) 98 - 107 mmol/L    Bicarbonate 30 21 - 32 mmol/L    Anion Gap 16 10 - 20 mmol/L    Urea Nitrogen 39 (H) 6 - 23 mg/dL    Creatinine 1.17 (H) 0.50 - 1.05 mg/dL    eGFR 55 (L) >60 mL/min/1.73m*2    Calcium 10.4 (H) 8.6 - 10.3 mg/dL    Albumin 4.9 3.4 - 5.0 g/dL    Alkaline Phosphatase 72 33 -  110 U/L    Total Protein 7.8 6.4 - 8.2 g/dL    AST 14 9 - 39 U/L    Bilirubin, Total 0.6 0.0 - 1.2 mg/dL    ALT 14 7 - 45 U/L   Lipase   Result Value Ref Range    Lipase 14 9 - 82 U/L   Lactate   Result Value Ref Range    Lactate 1.9 0.4 - 2.0 mmol/L   Protime-INR   Result Value Ref Range    Protime 11.5 9.8 - 12.8 seconds    INR 1.0 0.9 - 1.1   Fecal Occult Blood Immunoassy    Specimen: Stool   Result Value Ref Range    Fecal Occult Blood Immunoassay Positive (A) Negative   Type and Screen   Result Value Ref Range    ABO TYPE A     Rh TYPE POS     ANTIBODY SCREEN POS    Antibody Identification   Result Value Ref Range    Antibody ID Anti-E     CASE #     Urinalysis with Reflex Microscopic and Culture   Result Value Ref Range    Color, Urine Yellow Straw, Yellow    Appearance, Urine Clear Clear    Specific Gravity, Urine 1.035 1.005 - 1.035    pH, Urine 5.0 5.0, 5.5, 6.0, 6.5, 7.0, 7.5, 8.0    Protein, Urine NEGATIVE NEGATIVE mg/dL    Glucose, Urine NEGATIVE NEGATIVE mg/dL    Blood, Urine SMALL (1+) (A) NEGATIVE    Ketones, Urine NEGATIVE NEGATIVE mg/dL    Bilirubin, Urine NEGATIVE NEGATIVE    Urobilinogen, Urine <2.0 <2.0 mg/dL    Nitrite, Urine NEGATIVE NEGATIVE    Leukocyte Esterase, Urine NEGATIVE NEGATIVE   Urinalysis Microscopic   Result Value Ref Range    WBC, Urine 1-5 1-5, NONE /HPF    RBC, Urine NONE NONE, 1-2, 3-5 /HPF    Squamous Epithelial Cells, Urine 1-9 (SPARSE) Reference range not established. /HPF      CT abdomen pelvis w IV contrast    Result Date: 11/25/2023  STUDY: CT Abdomen and Pelvis with IV Contrast; 11/25/2023 8:24 AM INDICATION: Bloody stools. COMPARISON: CTA chest 07/27/2021. ACCESSION NUMBER(S): [id number] ORDERING CLINICIAN: ISAAC GARCIA TECHNIQUE: CT of the abdomen and pelvis was performed.  Contiguous axial images were obtained at 3 mm slice thickness through the abdomen and pelvis. Coronal and sagittal reconstructions at 3 mm slice thickness were performed.  Omnipaque 350 75 mL  was administered intravenously.  FINDINGS: LOWER CHEST: No cardiomegaly.  No pericardial effusion.  Lung bases are clear.  ABDOMEN:  LIVER: No hepatomegaly.  Smooth surface contour.  Normal attenuation.  BILE DUCTS: There is mild biliary duct dilatation but this is often seen in a postcholecystectomy patient. GALLBLADDER: The gallbladder is absent. STOMACH: No abnormalities identified.  PANCREAS: No masses or ductal dilatation.  SPLEEN: No splenomegaly or focal splenic lesion.  ADRENAL GLANDS: There is thickening of the left adrenal gland most likely representing hyperplasia.  KIDNEYS AND URETERS: Kidneys are normal in size and location.  No renal or ureteral calculi.  PELVIS:  BLADDER: No abnormalities identified.  REPRODUCTIVE ORGANS: No abnormalities identified.  BOWEL: There is diverticulosis.  There is abnormal thickening of the wall the colon with edema.  This begins in the splenic flexure extends to the sigmoid colon.  Although there are diverticula present, this has more the appearance of colitis.  The appendix is identified and is normal.  VESSELS: No abnormalities identified.  Abdominal aorta is normal in caliber.  PERITONEUM/RETROPERITONEUM/LYMPH NODES: No free fluid.  No pneumoperitoneum. No lymphadenopathy.  ABDOMINAL WALL: No abnormalities identified. SOFT TISSUES: No abnormalities identified.  BONES: There are degenerative change with spinal stenosis at L4-5.  No acute fracture or aggressive osseous lesion.    Abnormal thickening of the wall of the colon with edema beginning in the splenic flexure extending to the sigmoid most consistent with colitis. Diverticulosis. Signed by Ronal Marion MD        Assessment/Plan   Principal Problem:    Acute colitis resulting in BRBPR   -CT, prodrome, and leukocytosis all indicative of acute colitis.   -C diff and enteric panel pending   -fecal calprotectin pending   -S/P 1 dose of Zosyn in the ED, transition to ceftriaxone and flagyl   -GI consulting      Active  Problems:    Benign essential hypertension: holding all but coreg with normal BP and possible active bleeding.     Chronic combined systolic and diastolic CHF, NYHA class 3 (CMS/HCC)    COPD (chronic obstructive pulmonary disease) (CMS/HCC)    Hyperlipidemia    Type 2 diabetes mellitus (CMS/HCC)    Obesity (BMI 30-39.9)      Judd Ramirez MD PhD

## 2023-11-25 NOTE — PROGRESS NOTES
Nguyen Ponce is a 58 y.o. female admitted for Acute colitis. Pharmacy reviewed the patient's [id number] medications and allergies for accuracy.    The list below reflectives the updated PTA list. Please review each medication in order reconciliation for additional clarification and justification.    Medications added: advil 200mg 3 tabs at bedtime    Medications modified:    Medications to be removed:ibuprofen 800 spirolactone    Medications of concern:      Prior to Admission Medications   Prescriptions Last Dose Informant Patient Reported? Taking?   OneTouch Delica Plus Lancet 33 gauge misc   Yes No   Sig: use to test once daily   albuterol 90 mcg/actuation inhaler 11/24/2023  No No   Sig: Inhale 2 puffs every 4 hours if needed for wheezing or shortness of breath.   atorvastatin (Lipitor) 20 mg tablet 11/24/2023  No No   Sig: TAKE ONE TABLET BY MOUTH AT BEDTIME   budesonide-glycopyr-formoterol (Breztri Aerosphere) 160-9-4.8 mcg/actuation HFA aerosol inhaler 11/24/2023  No No   Sig: Inhale 2 puffs 2 times a day.   carvedilol (Coreg) 12.5 mg tablet 11/24/2023  Yes No   Sig: Take 1 tablet (12.5 mg) by mouth 2 times a day with meals.   dulaglutide (Trulicity) 0.75 mg/0.5 mL pen injector Past Week  No No   Sig: Inject 0.75 mg under the skin 1 (one) time per week.   Patient taking differently: Inject 0.75 mg under the skin 1 (one) time per week. On Saturdays   fluticasone (Flonase) 50 mcg/actuation nasal spray 11/24/2023  Yes No   Sig: Administer 1 spray into affected nostril(s) 2 times a day.   furosemide (Lasix) 40 mg tablet 11/24/2023  No No   Sig: TAKE ONE TABLET BY MOUTH DAILY   ibuprofen (AdviL) 200 mg tablet   Yes No   Sig: Take 3 tablets (600 mg) by mouth once daily at bedtime.   ipratropium-albuteroL (Duo-Neb) 0.5-2.5 mg/3 mL nebulizer solution 11/24/2023  Yes No   Sig: Inhale 3 mL every 4 hours if needed.   loratadine (Claritin) 10 mg tablet 11/24/2023  Yes No   Sig: Take 1 tablet (10 mg) by mouth  once daily.   montelukast (Singulair) 10 mg tablet 11/24/2023  No No   Sig: TAKE ONE TABLET BY MOUTH AT BEDTIME   multivitamin tablet 11/24/2023  Yes No   Sig: Take 1 tablet by mouth once daily.   olmesartan (BENIcar) 40 mg tablet 11/24/2023  Yes No   Sig: Take 1 tablet (40 mg) by mouth once daily.   potassium chloride ER (Micro-K) 10 mEq ER capsule 11/24/2023  Yes No   Sig: Take 1 capsule (10 mEq) by mouth once daily.      Facility-Administered Medications: None       Idania Zamora CPhT

## 2023-11-25 NOTE — H&P (VIEW-ONLY)
"[facility] Gastroenterology Consultation Note    ASSESSMENT and PLAN:       Nguyen Ponce is a 58 y.o. female with a significant past medical history of COPD, hyperlipidemia, diabetes mellitus type 2, combined systolic and diastolic CHF who presented with abdominal pain and bloody bowel movements. GI was consulted for \" colitis\".       Abdominal pain/colitis  -Ischemic versus infectious versus inflammatory etiology  -Agree with obtaining stool pathogen panel along with C. difficile EIA and fecal calprotectin for evaluation  -Agree with Cipro and Flagyl for possible ischemic colitis patient's presentation seems consistent with this and with patient's history of tobacco abuse  -Okay for full liquid diet today from GI point of view  -Tentatively plan on colonoscopy 11/27/2023 for evaluation  -Continue with supportive care        Marin Wright DO   Gastroenterology         HISTORY OF PRESENT ILLNESS:   Consult Reason: Colitis    History Of Present Illness:    Nguyen Ponce is a 58 y.o. female with a significant past medical history of COPD, hyperlipidemia, diabetes mellitus type 2, combined diastolic and systolic CHF who presented with abdominal pain and bloody bowel movements. GI was consulted for \" colitis\".    Patient presented to the emergency room this a.m. for main complaint of abdominal cramps next of kin without ability to do so she states she had a bowel movement and had shimon blood that had multiple bloody movements after that prompted her to present to the emergency room for evaluation.    In the ER patient routine lab work patient found to have a significant leukocytosis of 25,000 stable hemoglobin of 17 possibly hemoconcentrated.  Patient with CT of the abdomen pelvis showed left-sided colitis.  GI was consulted from the emergency room.      Patient was seen and evaluated in the emergency room this a.m.  She notes that she had abdominal pain and then bloody bowel movement this a.m.  She denies any " previous presentation for abdominal pain or colitis.  She missed a colonoscopy approximately 11 to 12 years ago denies any findings at that time.  She has not experienced constipation at home.  She denies any ongoing diarrhea over the last several months.                Endoscopic History     Remote history of colonoscopy approximately 10 to 12 years prior    Review of systems:     Patient denies any heartburn/GERD, N/V, dysphagia, odynophagia, constipation, hematemesis,  melena, or weight loss.    I performed a complete 10 point review of systems and it is negative except as noted in HPI or above.    All other systems have been reviewed and are negative.    .    Objective         PAST HISTORIES:       Past Medical History:  She has a past medical history of COPD (chronic obstructive pulmonary disease) (CMS/[facility]), Localized edema (09/14/2021), Lymphedema, not elsewhere classified (06/01/2021), Morbid (severe) obesity due to excess calories (CMS/[facility]) (01/12/2022), Obesity, Obesity, unspecified (01/12/2022), Obesity, unspecified (01/12/2022), Pain in left arm (05/17/2018), Pain in right arm, Personal history of other diseases of the circulatory system, Personal history of other diseases of the circulatory system (05/17/2018), Personal history of other endocrine, nutritional and metabolic disease, Personal history of other endocrine, nutritional and metabolic disease (01/12/2022), Personal history of other endocrine, nutritional and metabolic disease, Personal history of other infectious and parasitic diseases (01/12/2022), Personal history of peptic ulcer disease, Personal history of urinary calculi, and Prediabetes (01/12/2022).    Past Surgical History:  She has a past surgical history that includes Cholecystectomy (05/17/2018); Tonsillectomy (05/17/2018); Other surgical history (03/12/2020); and Cardiac catheterization (August 2021).      Social History:  She reports that she quit smoking about 22 months ago. Her  "smoking use included cigarettes. She has never used smokeless tobacco. She reports that she does not currently use alcohol. She reports that she does not use drugs.    Family History:  No known GI disease, specifically denies pancreatitis, Crohn's, colon cancer, gastroesophageal cancer, or ulcerative colitis.    Family History   Problem Relation Name Age of Onset    Hypertension Mother Ree Marion     Other (palpitations) Mother Ree Marion     Other (palpitations) Sister      Diabetes Daughter      Brain Aneurysm Mother's Sister      Brain Aneurysm Paternal Grandmother      Brain Aneurysm Paternal Grandfather          Allergies:  Patient has no known allergies.      OBJECTIVE:       Last Recorded Vitals:  Vitals:    11/25/23 0924 11/25/23 0926 11/25/23 1012 11/25/23 1019   BP: 114/74  125/79    BP Location:       Patient Position:       Pulse: 81  86    Resp: 20  18    Temp:       TempSrc:       SpO2: (!) 84% 95% 95% 95%   Weight:       Height:         /79   Pulse 86   Temp 36.1 °C (96.9 °F) (Temporal)   Resp 18   Ht 1.651 m (5' 5\")   Wt 107 kg (236 lb)   SpO2 95%   BMI 39.27 kg/m²      Physical Exam:    Physical Exam  Vitals reviewed.   Constitutional:       General: She is awake.      Appearance: Normal appearance. She is obese.   HENT:      Head: Normocephalic.      Mouth/Throat:      Mouth: Mucous membranes are moist.   Eyes:      Extraocular Movements: Extraocular movements intact.   Cardiovascular:      Rate and Rhythm: Normal rate.      Heart sounds: Normal heart sounds.   Pulmonary:      Effort: Pulmonary effort is normal.      Breath sounds: Normal breath sounds.   Abdominal:      Palpations: Abdomen is soft.      Tenderness: There is no abdominal tenderness. There is no guarding or rebound.      Hernia: No hernia is present.   Musculoskeletal:         General: Normal range of motion.      Cervical back: Neck supple.   Skin:     General: Skin is warm and dry.   Neurological:      General: " No focal deficit present.      Mental Status: She is alert.   Psychiatric:         Attention and Perception: Attention and perception normal.         Mood and Affect: Mood normal.         Behavior: Behavior normal.             Inpatient Medications:  cefTRIAXone, 1 g, intravenous, q24h  metroNIDAZOLE, 500 mg, intravenous, q8h          Outpatient Medications:  Prior to Admission medications    Medication Sig Start Date End Date Taking? Authorizing Provider   albuterol 90 mcg/actuation inhaler Inhale 2 puffs every 4 hours if needed for wheezing or shortness of breath. 10/16/23   ROYAL Dela Cruz   atorvastatin (Lipitor) 20 mg tablet TAKE ONE TABLET BY MOUTH AT BEDTIME 11/13/23   HORACE Duenas   budesonide-glycopyr-formoterol (Breztri Aerosphere) 160-9-4.8 mcg/actuation HFA aerosol inhaler Inhale 2 puffs 2 times a day. 10/16/23 10/15/24  ROYAL Dela Cruz   carvedilol (Coreg) 12.5 mg tablet Take 1 tablet (12.5 mg) by mouth 2 times a day with meals.    Historical Provider, MD   dulaglutide (Trulicity) 0.75 mg/0.5 mL pen injector Inject 0.75 mg under the skin 1 (one) time per week. 7/24/23   HORACE Duenas   fluticasone (Flonase) 50 mcg/actuation nasal spray Administer 1 spray into affected nostril(s) 2 times a day. 8/18/21   Historical Provider, MD   furosemide (Lasix) 40 mg tablet TAKE ONE TABLET BY MOUTH DAILY 11/14/23   ROYAL Wang   ibuprofen 800 mg tablet Take 1 tablet (800 mg) by mouth 2 times a day as needed. 8/23/22   Historical Provider, MD   ipratropium-albuteroL (Duo-Neb) 0.5-2.5 mg/3 mL nebulizer solution Inhale 3 mL every 4 hours if needed. 8/18/21   Historical Provider, MD   loratadine (Claritin) 10 mg tablet Take 1 tablet (10 mg) by mouth once daily.    Historical Provider, MD   montelukast (Singulair) 10 mg tablet TAKE ONE TABLET BY MOUTH AT BEDTIME 11/13/23   Milena R Jesse, APRN-CNP   multivitamin tablet Take 1 tablet by mouth once daily.    Historical  "Provider, MD   olmesartan (BENIcar) 40 mg tablet Take 1 tablet (40 mg) by mouth once daily. 10/16/19   Historical Provider, MD   OneTouch Delica Plus Lancet 33 gauge misc use to test once daily 4/17/23   Historical Provider, MD   potassium chloride ER (Micro-K) 10 mEq ER capsule Take 1 capsule (10 mEq) by mouth once daily. 5/8/23   Historical Provider, MD   spironolactone (Aldactone) 25 mg tablet Take 1 half tablet by mouth once daily. 12/28/22   Historical Provider, MD   verapamil ER (Veralan PM) 180 mg 24 hr capsule Take 1 capsule (180 mg) by mouth 2 times a day.  11/21/23  Historical Provider, MD       LABS AND IMAGING:     Last Labs:      Lab Results   Component Value Date    HGB 17.0 (H) 11/25/2023     (H) 11/25/2023     11/25/2023       No results found for: \"FERRITIN\", \"IRON\"    Lab Results   Component Value Date     11/25/2023    K 3.6 11/25/2023    CL 95 (L) 11/25/2023    BUN 39 (H) 11/25/2023    CREATININE 1.17 (H) 11/25/2023       Lab Results   Component Value Date    BILITOT 0.6 11/25/2023     Lab Results   Component Value Date    ALT 14 11/25/2023    AST 14 11/25/2023    ALKPHOS 72 11/25/2023       No results found for: \"CRP\"    No results found for: \"CALPS\"      Imaging Results     CT abdomen pelvis w IV contrast    Result Date: 11/25/2023  STUDY: CT Abdomen and Pelvis with IV Contrast; 11/25/2023 8:24 AM INDICATION: Bloody stools. COMPARISON: CTA chest 07/27/2021. ACCESSION NUMBER(S): [id number] ORDERING CLINICIAN: ISAAC GARCIA TECHNIQUE: CT of the abdomen and pelvis was performed.  Contiguous axial images were obtained at 3 mm slice thickness through the abdomen and pelvis. Coronal and sagittal reconstructions at 3 mm slice thickness were performed.  Omnipaque 350 75 mL was administered intravenously.  FINDINGS: LOWER CHEST: No cardiomegaly.  No pericardial effusion.  Lung bases are clear.  ABDOMEN:  LIVER: No hepatomegaly.  Smooth surface contour.  Normal attenuation.  BILE " DUCTS: There is mild biliary duct dilatation but this is often seen in a postcholecystectomy patient. GALLBLADDER: The gallbladder is absent. STOMACH: No abnormalities identified.  PANCREAS: No masses or ductal dilatation.  SPLEEN: No splenomegaly or focal splenic lesion.  ADRENAL GLANDS: There is thickening of the left adrenal gland most likely representing hyperplasia.  KIDNEYS AND URETERS: Kidneys are normal in size and location.  No renal or ureteral calculi.  PELVIS:  BLADDER: No abnormalities identified.  REPRODUCTIVE ORGANS: No abnormalities identified.  BOWEL: There is diverticulosis.  There is abnormal thickening of the wall the colon with edema.  This begins in the splenic flexure extends to the sigmoid colon.  Although there are diverticula present, this has more the appearance of colitis.  The appendix is identified and is normal.  VESSELS: No abnormalities identified.  Abdominal aorta is normal in caliber.  PERITONEUM/RETROPERITONEUM/LYMPH NODES: No free fluid.  No pneumoperitoneum. No lymphadenopathy.  ABDOMINAL WALL: No abnormalities identified. SOFT TISSUES: No abnormalities identified.  BONES: There are degenerative change with spinal stenosis at L4-5.  No acute fracture or aggressive osseous lesion.    Abnormal thickening of the wall of the colon with edema beginning in the splenic flexure extending to the sigmoid most consistent with colitis. Diverticulosis. Signed by Ronal Marion MD

## 2023-11-25 NOTE — ED PROVIDER NOTES
HPI   Chief Complaint   Patient presents with    Abdominal Pain    Black or Bloody Stool     Had abdominal pain and cramps, took a couple laxatives, had diarrhea, appeared to have multiple dark red bloody bowel movements, dizzy       This is a 58-year-old  female, with a history of CHF, COPD, type 2 diabetes, and ambulatory secondary to spinal surgery, that is presenting to the emergency room with complaints of abdominal pain and rectal bleeding.  The patient ate Thanksgiving dinner yesterday.  She started to feel unwell and laid down.  She then had the urge that she had to use the bathroom.  She attempted to use the bathroom and states that she was not able to pass any stool.  She tried to lay back down but the sensation of having to use the bathroom return.  She then was able to use the bathroom but it was all blood.  She reports that she has had multiple episodes throughout the night and had 1 episode of nausea and vomiting which was not hemoptysis.  She is having left-sided abdominal pain.  She reports normal urine function.  She states she has been feeling hot and cold.  Her sister reports that she was experiencing mild cold-like symptoms prior to this episode.  The patient takes Aleve PM, 3 tablets at night every night.  She is not on any blood thinners.  She denies any fever, chest pain, shortness of breath, palpitations, paresthesias, focal weakness.  She reports that she did feel mildly dizzy during her episodes.  She denies any syncope, vision changes, or speech changes.  She denies any recent antibiotic use, history of C. difficile, or recent international travel.                          [place] Coma Scale Score: 15                  Patient History   Past Medical History:   Diagnosis Date    COPD (chronic obstructive pulmonary disease) (CMS/[facility])     Localized edema 09/14/2021    Edema leg    Lymphedema, not elsewhere classified 06/01/2021    Lymphedema of both lower extremities    Morbid  (severe) obesity due to excess calories (CMS/[facility]) 01/12/2022    Class 2 severe obesity with serious comorbidity and body mass index (BMI) of 36.0 to 36.9 in adult, unspecified obesity type    Obesity     Obesity, unspecified 01/12/2022    Class 2 obesity with body mass index (BMI) of 36.0 to 36.9 in adult    Obesity, unspecified 01/12/2022    Class 2 obesity with body mass index (BMI) of 37.0 to 37.9 in adult    Pain in left arm 05/17/2018    Left arm pain    Pain in right arm     Pain of right upper extremity    Personal history of other diseases of the circulatory system     History of PSVT (paroxysmal supraventricular tachycardia)    Personal history of other diseases of the circulatory system 05/17/2018    History of congestive heart failure    Personal history of other endocrine, nutritional and metabolic disease     History of hypoglycemia    Personal history of other endocrine, nutritional and metabolic disease 01/12/2022    History of uncontrolled diabetes    Personal history of other endocrine, nutritional and metabolic disease     History of hypokalemia    Personal history of other infectious and parasitic diseases 01/12/2022    History of dermatophytosis    Personal history of peptic ulcer disease     History of gastric ulcer    Personal history of urinary calculi     History of kidney stones    Prediabetes 01/12/2022    Prediabetes     Past Surgical History:   Procedure Laterality Date    CARDIAC CATHETERIZATION  August 2021    CHOLECYSTECTOMY  05/17/2018    Cholecystectomy    OTHER SURGICAL HISTORY  03/12/2020    Anterior cervical vertebral fusion    TONSILLECTOMY  05/17/2018    Tonsillectomy     Family History   Problem Relation Name Age of Onset    Hypertension Mother Ree Marion     Other (palpitations) Mother Ree Marion     Other (palpitations) Sister      Diabetes Daughter      Brain Aneurysm Mother's Sister      Brain Aneurysm Paternal Grandmother      Brain Aneurysm Paternal Grandfather        Social History     Tobacco Use    Smoking status: Former     Packs/day: 0.00     Years: 15.00     Additional pack years: 0.00     Total pack years: 0.00     Types: Cigarettes     Quit date: 2022     Years since quittin.8    Smokeless tobacco: Never   Vaping Use    Vaping Use: Never used   Substance Use Topics    Alcohol use: Not Currently     Comment: sometimes    Drug use: Never       Physical Exam   ED Triage Vitals   Temp Heart Rate Resp BP   23 0606 23 0603 23 0603 23 06   36.1 °C (96.9 °F) 86 18 (!) 174/111      SpO2 Temp Source Heart Rate Source Patient Position   23 0606 23 0606 23 06 --   96 % Temporal Monitor       BP Location FiO2 (%)     23 06 --     Left arm        Physical Exam  Vitals and nursing note reviewed.   Constitutional:       Appearance: She is obese.   HENT:      Head: Normocephalic and atraumatic.      Mouth/Throat:      Pharynx: Oropharynx is clear.   Eyes:      Extraocular Movements: Extraocular movements intact.      Pupils: Pupils are equal, round, and reactive to light.   Cardiovascular:      Rate and Rhythm: Normal rate and regular rhythm.      Heart sounds: Normal heart sounds.   Pulmonary:      Effort: Pulmonary effort is normal.      Breath sounds: Normal breath sounds.   Abdominal:      General: Abdomen is protuberant. Bowel sounds are increased.      Palpations: Abdomen is soft. There is no splenomegaly, mass or pulsatile mass.      Tenderness: There is abdominal tenderness in the left upper quadrant and left lower quadrant. There is no rebound. Negative signs include Wagner's sign, Rovsing's sign and McBurney's sign.      Hernia: No hernia is present.   Skin:     General: Skin is warm and dry.      Capillary Refill: Capillary refill takes less than 2 seconds.   Neurological:      General: No focal deficit present.      Mental Status: She is alert.   Psychiatric:         Mood and Affect: Mood normal.          Behavior: Behavior normal.         ED Course & MDM   Diagnoses as of 11/25/23 1826   Gastrointestinal hemorrhage, unspecified gastrointestinal hemorrhage type   Acute colitis       Medical Decision Making  Patient was seen and evaluated with the attending physician Dr. Nielsen.  The patient is presenting to the emergency room complaints of abdominal pain and diarrhea.  Differential diagnosis includes diverticulitis, colitis, Crohn's, constipation, diverticular bleed, hemorrhoid, or other acute process.  A saline lock was established.  Laboratory studies were drawn with results as noted.  The patient was administered 1 L of normal saline wide open for hydration, Zofran 4 mg IVP, morphine 4 mg IVP.  The patient was also given Protonix 40 mg IVP.  The patient had recurrent pain and was further medicated with 1/2 mg of Dilaudid and additional 4 mg of Zofran IVP.  The patient's laboratory studies have showed a stable H&H, a leukocytosis with a white count of 25.4, glucose of 129, chloride 95, BUN of 39, and a creatinine of 1.17.  A CAT scan of the abdomen pelvis was performed and showed diverticulosis without any evidence of diverticulitis.  There is abnormal thickening of the wall of the colon with edema suspicious for colitis.  [facility] was consulted regarding management the patient and the patient is to be admitted to the general medical floor under Dr. Ramirez.        Procedure  Procedures     CATALINA Antoine-CNP  11/25/23 1830

## 2023-11-25 NOTE — CARE PLAN
The patient's goals for the shift include      The clinical goals for the shift include pain < 3/10    Over the shift, the patient did not make progress toward the following goals. Barriers to progression include divertiulosis. Recommendations to address these barriers include assess pain and medicate PRN.

## 2023-11-25 NOTE — PROGRESS NOTES
Nguyen Ponce is a 58 y.o. female admitted for Acute colitis. Pharmacy reviewed the patient's [id number] medications and allergies for accuracy.    The list below reflectives the updated PTA list. Please review each medication in order reconciliation for additional clarification and justification.    Medications added: advil 200mg 3 tabs at bedtime    Medications modified:    Medications to be removed:ibuprofen 800 spirolactone    Medications of concern:      Prior to Admission Medications   Prescriptions Last Dose Informant Patient Reported? Taking?   OneTouch Delica Plus Lancet 33 gauge misc   Yes No   Sig: use to test once daily   albuterol 90 mcg/actuation inhaler 11/24/2023  No No   Sig: Inhale 2 puffs every 4 hours if needed for wheezing or shortness of breath.   atorvastatin (Lipitor) 20 mg tablet 11/24/2023  No No   Sig: TAKE ONE TABLET BY MOUTH AT BEDTIME   budesonide-glycopyr-formoterol (Breztri Aerosphere) 160-9-4.8 mcg/actuation HFA aerosol inhaler 11/24/2023  No No   Sig: Inhale 2 puffs 2 times a day.   carvedilol (Coreg) 12.5 mg tablet 11/24/2023  Yes No   Sig: Take 1 tablet (12.5 mg) by mouth 2 times a day with meals.   dulaglutide (Trulicity) 0.75 mg/0.5 mL pen injector Past Week  No No   Sig: Inject 0.75 mg under the skin 1 (one) time per week.   Patient taking differently: Inject 0.75 mg under the skin 1 (one) time per week. On Saturdays   fluticasone (Flonase) 50 mcg/actuation nasal spray 11/24/2023  Yes No   Sig: Administer 1 spray into affected nostril(s) 2 times a day.   furosemide (Lasix) 40 mg tablet 11/24/2023  No No   Sig: TAKE ONE TABLET BY MOUTH DAILY   ibuprofen (AdviL) 200 mg tablet   Yes No   Sig: Take 3 tablets (600 mg) by mouth once daily at bedtime.   ipratropium-albuteroL (Duo-Neb) 0.5-2.5 mg/3 mL nebulizer solution 11/24/2023  Yes No   Sig: Inhale 3 mL every 4 hours if needed.   loratadine (Claritin) 10 mg tablet 11/24/2023  Yes No   Sig: Take 1 tablet (10 mg) by mouth  once daily.   montelukast (Singulair) 10 mg tablet 11/24/2023  No No   Sig: TAKE ONE TABLET BY MOUTH AT BEDTIME   multivitamin tablet 11/24/2023  Yes No   Sig: Take 1 tablet by mouth once daily.   olmesartan (BENIcar) 40 mg tablet 11/24/2023  Yes No   Sig: Take 1 tablet (40 mg) by mouth once daily.   potassium chloride ER (Micro-K) 10 mEq ER capsule 11/24/2023  Yes No   Sig: Take 1 capsule (10 mEq) by mouth once daily.      Facility-Administered Medications: None       Idania Zamora CPhT

## 2023-11-26 ENCOUNTER — ANESTHESIA EVENT (OUTPATIENT)
Dept: OPERATING ROOM | Facility: HOSPITAL | Age: 58
End: 2023-11-26
Payer: COMMERCIAL

## 2023-11-26 LAB
ANION GAP SERPL CALC-SCNC: 13 MMOL/L (ref 10–20)
BUN SERPL-MCNC: 34 MG/DL (ref 6–23)
C COLI+JEJ+UPSA DNA STL QL NAA+PROBE: NOT DETECTED
CALCIUM SERPL-MCNC: 8.6 MG/DL (ref 8.6–10.3)
CHLORIDE SERPL-SCNC: 99 MMOL/L (ref 98–107)
CO2 SERPL-SCNC: 27 MMOL/L (ref 21–32)
CREAT SERPL-MCNC: 1.42 MG/DL (ref 0.5–1.05)
EC STX1 GENE STL QL NAA+PROBE: NOT DETECTED
EC STX2 GENE STL QL NAA+PROBE: NOT DETECTED
ERYTHROCYTE [DISTWIDTH] IN BLOOD BY AUTOMATED COUNT: 12.9 % (ref 11.5–14.5)
GFR SERPL CREATININE-BSD FRML MDRD: 43 ML/MIN/1.73M*2
GLUCOSE BLD MANUAL STRIP-MCNC: 121 MG/DL (ref 74–99)
GLUCOSE BLD MANUAL STRIP-MCNC: 126 MG/DL (ref 74–99)
GLUCOSE BLD MANUAL STRIP-MCNC: 128 MG/DL (ref 74–99)
GLUCOSE BLD MANUAL STRIP-MCNC: 148 MG/DL (ref 74–99)
GLUCOSE SERPL-MCNC: 138 MG/DL (ref 74–99)
HCT VFR BLD AUTO: 42.3 % (ref 36–46)
HGB BLD-MCNC: 13.9 G/DL (ref 12–16)
MCH RBC QN AUTO: 34.2 PG (ref 26–34)
MCHC RBC AUTO-ENTMCNC: 32.9 G/DL (ref 32–36)
MCV RBC AUTO: 104 FL (ref 80–100)
NOROVIRUS GI + GII RNA STL NAA+PROBE: NOT DETECTED
NRBC BLD-RTO: 0 /100 WBCS (ref 0–0)
PLATELET # BLD AUTO: 309 X10*3/UL (ref 150–450)
POTASSIUM SERPL-SCNC: 3.6 MMOL/L (ref 3.5–5.3)
RBC # BLD AUTO: 4.06 X10*6/UL (ref 4–5.2)
RV RNA STL NAA+PROBE: NOT DETECTED
SALMONELLA DNA STL QL NAA+PROBE: NOT DETECTED
SHIGELLA DNA SPEC QL NAA+PROBE: NOT DETECTED
SODIUM SERPL-SCNC: 135 MMOL/L (ref 136–145)
V CHOLERAE DNA STL QL NAA+PROBE: NOT DETECTED
WBC # BLD AUTO: 16.4 X10*3/UL (ref 4.4–11.3)
Y ENTEROCOL DNA STL QL NAA+PROBE: NOT DETECTED

## 2023-11-26 PROCEDURE — 99232 SBSQ HOSP IP/OBS MODERATE 35: CPT | Performed by: INTERNAL MEDICINE

## 2023-11-26 PROCEDURE — 82947 ASSAY GLUCOSE BLOOD QUANT: CPT

## 2023-11-26 PROCEDURE — 2500000004 HC RX 250 GENERAL PHARMACY W/ HCPCS (ALT 636 FOR OP/ED): Mod: JZ | Performed by: INTERNAL MEDICINE

## 2023-11-26 PROCEDURE — 85027 COMPLETE CBC AUTOMATED: CPT | Performed by: INTERNAL MEDICINE

## 2023-11-26 PROCEDURE — 1210000001 HC SEMI-PRIVATE ROOM DAILY

## 2023-11-26 PROCEDURE — 2500000001 HC RX 250 WO HCPCS SELF ADMINISTERED DRUGS (ALT 637 FOR MEDICARE OP): Performed by: INTERNAL MEDICINE

## 2023-11-26 PROCEDURE — 36415 COLL VENOUS BLD VENIPUNCTURE: CPT | Performed by: INTERNAL MEDICINE

## 2023-11-26 PROCEDURE — 2500000002 HC RX 250 W HCPCS SELF ADMINISTERED DRUGS (ALT 637 FOR MEDICARE OP, ALT 636 FOR OP/ED): Performed by: INTERNAL MEDICINE

## 2023-11-26 PROCEDURE — 80048 BASIC METABOLIC PNL TOTAL CA: CPT | Performed by: INTERNAL MEDICINE

## 2023-11-26 PROCEDURE — 96376 TX/PRO/DX INJ SAME DRUG ADON: CPT

## 2023-11-26 PROCEDURE — 99233 SBSQ HOSP IP/OBS HIGH 50: CPT | Performed by: INTERNAL MEDICINE

## 2023-11-26 PROCEDURE — 96366 THER/PROPH/DIAG IV INF ADDON: CPT

## 2023-11-26 RX ORDER — SODIUM CHLORIDE 9 MG/ML
50 INJECTION, SOLUTION INTRAVENOUS CONTINUOUS
Status: CANCELLED | OUTPATIENT
Start: 2023-11-27

## 2023-11-26 RX ORDER — FAMOTIDINE 10 MG/ML
20 INJECTION INTRAVENOUS ONCE
Status: CANCELLED | OUTPATIENT
Start: 2023-11-26 | End: 2023-11-26

## 2023-11-26 RX ORDER — SODIUM CHLORIDE 9 MG/ML
50 INJECTION, SOLUTION INTRAVENOUS CONTINUOUS
Status: ACTIVE | OUTPATIENT
Start: 2023-11-26 | End: 2023-11-27

## 2023-11-26 RX ORDER — METOCLOPRAMIDE HYDROCHLORIDE 5 MG/ML
10 INJECTION INTRAMUSCULAR; INTRAVENOUS ONCE
Status: CANCELLED | OUTPATIENT
Start: 2023-11-26 | End: 2023-11-26

## 2023-11-26 RX ORDER — SODIUM CHLORIDE 0.9 % (FLUSH) 0.9 %
SYRINGE (ML) INJECTION
Status: COMPLETED
Start: 2023-11-26 | End: 2023-11-26

## 2023-11-26 RX ORDER — POLYETHYLENE GLYCOL 3350, SODIUM CHLORIDE, SODIUM BICARBONATE, POTASSIUM CHLORIDE 420; 11.2; 5.72; 1.48 G/4L; G/4L; G/4L; G/4L
4000 POWDER, FOR SOLUTION ORAL ONCE
Status: COMPLETED | OUTPATIENT
Start: 2023-11-26 | End: 2023-11-26

## 2023-11-26 RX ORDER — SODIUM CHLORIDE 0.9 % (FLUSH) 0.9 %
SYRINGE (ML) INJECTION
Status: DISPENSED
Start: 2023-11-26 | End: 2023-11-27

## 2023-11-26 RX ORDER — SODIUM CITRATE AND CITRIC ACID MONOHYDRATE 334; 500 MG/5ML; MG/5ML
30 SOLUTION ORAL ONCE
Status: CANCELLED | OUTPATIENT
Start: 2023-11-26 | End: 2023-11-26

## 2023-11-26 RX ADMIN — ONDANSETRON 4 MG: 2 INJECTION INTRAMUSCULAR; INTRAVENOUS at 18:53

## 2023-11-26 RX ADMIN — LORATADINE 10 MG: 10 TABLET ORAL at 08:45

## 2023-11-26 RX ADMIN — Medication 10 ML: at 14:38

## 2023-11-26 RX ADMIN — BUDESONIDE, GLYCOPYRROLATE, AND FORMOTEROL FUMARATE 2 PUFF: 160; 9; 4.8 AEROSOL, METERED RESPIRATORY (INHALATION) at 22:14

## 2023-11-26 RX ADMIN — FLUTICASONE PROPIONATE 2 SPRAY: 50 SPRAY, METERED NASAL at 22:14

## 2023-11-26 RX ADMIN — MONTELUKAST 10 MG: 10 TABLET, FILM COATED ORAL at 22:14

## 2023-11-26 RX ADMIN — OXYCODONE HYDROCHLORIDE 5 MG: 5 TABLET ORAL at 17:23

## 2023-11-26 RX ADMIN — ALBUTEROL SULFATE 2 PUFF: 90 AEROSOL, METERED RESPIRATORY (INHALATION) at 01:42

## 2023-11-26 RX ADMIN — CARVEDILOL 12.5 MG: 12.5 TABLET, FILM COATED ORAL at 17:00

## 2023-11-26 RX ADMIN — METRONIDAZOLE 500 MG: 500 INJECTION, SOLUTION INTRAVENOUS at 01:33

## 2023-11-26 RX ADMIN — ATORVASTATIN CALCIUM 20 MG: 20 TABLET, FILM COATED ORAL at 22:14

## 2023-11-26 RX ADMIN — SODIUM CHLORIDE 50 ML/HR: 9 INJECTION, SOLUTION INTRAVENOUS at 09:47

## 2023-11-26 RX ADMIN — ONDANSETRON 4 MG: 2 INJECTION INTRAMUSCULAR; INTRAVENOUS at 03:08

## 2023-11-26 RX ADMIN — Medication 3 MG: at 22:14

## 2023-11-26 RX ADMIN — POLYETHYLENE GLYCOL 3350, SODIUM SULFATE ANHYDROUS, SODIUM BICARBONATE, SODIUM CHLORIDE, POTASSIUM CHLORIDE 4000 ML: 236; 22.74; 6.74; 5.86; 2.97 POWDER, FOR SOLUTION ORAL at 17:18

## 2023-11-26 RX ADMIN — METRONIDAZOLE 500 MG: 500 INJECTION, SOLUTION INTRAVENOUS at 17:19

## 2023-11-26 RX ADMIN — OXYCODONE HYDROCHLORIDE 7.5 MG: 5 TABLET ORAL at 03:02

## 2023-11-26 RX ADMIN — Medication 10 ML: at 13:21

## 2023-11-26 RX ADMIN — CARVEDILOL 12.5 MG: 12.5 TABLET, FILM COATED ORAL at 08:45

## 2023-11-26 RX ADMIN — CEFTRIAXONE SODIUM 1 G: 1 INJECTION, SOLUTION INTRAVENOUS at 17:02

## 2023-11-26 RX ADMIN — METRONIDAZOLE 500 MG: 500 INJECTION, SOLUTION INTRAVENOUS at 10:27

## 2023-11-26 RX ADMIN — FLUTICASONE PROPIONATE 2 SPRAY: 50 SPRAY, METERED NASAL at 08:52

## 2023-11-26 RX ADMIN — OXYCODONE HYDROCHLORIDE 5 MG: 5 TABLET ORAL at 22:14

## 2023-11-26 ASSESSMENT — PAIN - FUNCTIONAL ASSESSMENT
PAIN_FUNCTIONAL_ASSESSMENT: 0-10
PAIN_FUNCTIONAL_ASSESSMENT: 0-10

## 2023-11-26 ASSESSMENT — COGNITIVE AND FUNCTIONAL STATUS - GENERAL
DAILY ACTIVITIY SCORE: 24
DAILY ACTIVITIY SCORE: 24
MOBILITY SCORE: 24
MOBILITY SCORE: 24

## 2023-11-26 ASSESSMENT — PAIN SCALES - GENERAL
PAINLEVEL_OUTOF10: 2
PAINLEVEL_OUTOF10: 7
PAINLEVEL_OUTOF10: 9

## 2023-11-26 NOTE — CARE PLAN
Problem: Pain  Goal: Takes deep breaths with improved pain control throughout the shift  Outcome: Progressing  Goal: Turns in bed with improved pain control throughout the shift  Outcome: Progressing  Goal: Walks with improved pain control throughout the shift  Outcome: Progressing       The clinical goals for the shift include Patient will maintain adequate pain control throughout this shift.

## 2023-11-26 NOTE — CARE PLAN
Problem: Pain  Goal: Takes deep breaths with improved pain control throughout the shift  Outcome: Progressing  Goal: Turns in bed with improved pain control throughout the shift  Outcome: Progressing  Goal: Walks with improved pain control throughout the shift  Outcome: Progressing   The patient's goals for the shift include      The clinical goals for the shift include pt will maintain adeqaute pain control this shift    Over the shift, the patient did not make progress toward the following goals. Barriers to progression include n/a. Recommendations to address these barriers include assess and reassess pain/give pain meds as needed.

## 2023-11-26 NOTE — PROGRESS NOTES
Nguyen Ponce is a 58 y.o. female on day 1 of admission presenting with Acute colitis.    Subjective   Nguyen Ponce is a 58 y.o. female with a PMH of DM2, HTN, MDD/LICHA, HLD, COPD, combined systolic and diastolic CHF and Obesity presenting with abdominal pain and BRBPR.      Patient states that on Friday evening she began to experience abdominal cramps and the urge to defecate without the ability to do so. This persisted then eventually patient had a BM that was shimon blood. She then had multiple bloody bowel movements thereafter prompting her to come to the ED. In the ED she was noted to have leukocytosis and CTAP revealed colitis of the L colon and sigmoid colon.    11/26: She is much improved today. Pain nearly resolved. Scr worsening. Starting IVF. GI plans on colonoscopy in the AM.            Objective     Physical Exam  Vitals and nursing note reviewed.   Constitutional:       General: She is not in acute distress.     Appearance: She is not ill-appearing or toxic-appearing.   HENT:      Head: Normocephalic and atraumatic.   Cardiovascular:      Rate and Rhythm: Normal rate and regular rhythm.      Pulses: Normal pulses.      Heart sounds: Normal heart sounds. No murmur heard.     No friction rub. No gallop.   Pulmonary:      Effort: Pulmonary effort is normal. No respiratory distress.      Breath sounds: No wheezing, rhonchi or rales.   Abdominal:      General: Abdomen is flat. Bowel sounds are normal. There is no distension.      Palpations: Abdomen is soft.      Tenderness: There is abdominal tenderness. There is no guarding or rebound.      Comments: mild TTP worst in the LUQ and LLQ   Musculoskeletal:      Cervical back: Neck supple. No rigidity or tenderness.   Skin:     General: Skin is warm and dry.   Neurological:      General: No focal deficit present.   Psychiatric:         Mood and Affect: Mood normal.     Last Recorded Vitals  Blood pressure 115/77, pulse 86, temperature 37.2 °C (98.9 °F),  "temperature source Temporal, resp. rate 20, height 1.651 m (5' 5\"), weight 107 kg (236 lb), SpO2 90 %.  Intake/Output last 3 Shifts:  I/O last 3 completed shifts:  In: 430 (4 mL/kg) [P.O.:180; IV Piggyback:250]  Out: 2 (0 mL/kg) [Stool:2]  Weight: 107 kg     Relevant Results                             Assessment/Plan   Acute colitis resulting in BRBPR   -CT, prodrome, and leukocytosis all indicative of acute colitis.   -C diff and enteric panel negative  -fecal calprotectin pending   -Continue ceftriaxone and flagyl D2  -GI consulting, planning on colonoscopy tomorrow      CARRIE  -likely prerenal in the setting of poor PO intake and diarrhea 2/2 above.   -replete volume   -Trend Scr     Active Problems:    Benign essential hypertension: holding all but coreg with normal BP and possible active bleeding.     Chronic combined systolic and diastolic CHF, NYHA class 3 (CMS/HCC)    COPD (chronic obstructive pulmonary disease) (CMS/HCC)    Hyperlipidemia    Type 2 diabetes mellitus (CMS/HCC)    Obesity (BMI 30-39.9)    Judd Ramirez MD PhD      "

## 2023-11-26 NOTE — PROGRESS NOTES
"Nguyen Ponce is a 58 y.o. female on day 1 of admission presenting with Acute colitis.    Subjective   Patient's abdominal pain is improved today.  No further bloody bowel movements since yesterday.       Objective     Physical Exam  Vitals reviewed.   Constitutional:       General: She is awake.      Appearance: Normal appearance.   HENT:      Head: Normocephalic.      Mouth/Throat:      Mouth: Mucous membranes are moist.   Eyes:      Extraocular Movements: Extraocular movements intact.   Cardiovascular:      Rate and Rhythm: Normal rate.      Heart sounds: Normal heart sounds.   Pulmonary:      Effort: Pulmonary effort is normal.      Breath sounds: Normal breath sounds.   Abdominal:      General: Bowel sounds are normal.      Palpations: Abdomen is soft.      Tenderness: There is no abdominal tenderness. There is no guarding or rebound.      Hernia: No hernia is present.   Musculoskeletal:         General: Normal range of motion.      Cervical back: Neck supple.   Skin:     General: Skin is warm and dry.   Neurological:      General: No focal deficit present.      Mental Status: She is alert.   Psychiatric:         Attention and Perception: Attention and perception normal.         Mood and Affect: Mood normal.         Behavior: Behavior normal.         Last Recorded Vitals  Blood pressure 115/77, pulse 86, temperature 37.2 °C (98.9 °F), temperature source Temporal, resp. rate 20, height 1.651 m (5' 5\"), weight 107 kg (236 lb), SpO2 90 %.  Intake/Output last 3 Shifts:  I/O last 3 completed shifts:  In: 430 (4 mL/kg) [P.O.:180; IV Piggyback:250]  Out: 2 (0 mL/kg) [Stool:2]  Weight: 107 kg     Relevant Results      CT abdomen pelvis w IV contrast    Result Date: 11/25/2023  STUDY: CT Abdomen and Pelvis with IV Contrast; 11/25/2023 8:24 AM INDICATION: Bloody stools. COMPARISON: CTA chest 07/27/2021. ACCESSION NUMBER(S): [id number] ORDERING CLINICIAN: ISAAC GARCIA TECHNIQUE: CT of the abdomen and pelvis was " performed.  Contiguous axial images were obtained at 3 mm slice thickness through the abdomen and pelvis. Coronal and sagittal reconstructions at 3 mm slice thickness were performed.  Omnipaque 350 75 mL was administered intravenously.  FINDINGS: LOWER CHEST: No cardiomegaly.  No pericardial effusion.  Lung bases are clear.  ABDOMEN:  LIVER: No hepatomegaly.  Smooth surface contour.  Normal attenuation.  BILE DUCTS: There is mild biliary duct dilatation but this is often seen in a postcholecystectomy patient. GALLBLADDER: The gallbladder is absent. STOMACH: No abnormalities identified.  PANCREAS: No masses or ductal dilatation.  SPLEEN: No splenomegaly or focal splenic lesion.  ADRENAL GLANDS: There is thickening of the left adrenal gland most likely representing hyperplasia.  KIDNEYS AND URETERS: Kidneys are normal in size and location.  No renal or ureteral calculi.  PELVIS:  BLADDER: No abnormalities identified.  REPRODUCTIVE ORGANS: No abnormalities identified.  BOWEL: There is diverticulosis.  There is abnormal thickening of the wall the colon with edema.  This begins in the splenic flexure extends to the sigmoid colon.  Although there are diverticula present, this has more the appearance of colitis.  The appendix is identified and is normal.  VESSELS: No abnormalities identified.  Abdominal aorta is normal in caliber.  PERITONEUM/RETROPERITONEUM/LYMPH NODES: No free fluid.  No pneumoperitoneum. No lymphadenopathy.  ABDOMINAL WALL: No abnormalities identified. SOFT TISSUES: No abnormalities identified.  BONES: There are degenerative change with spinal stenosis at L4-5.  No acute fracture or aggressive osseous lesion.    Abnormal thickening of the wall of the colon with edema beginning in the splenic flexure extending to the sigmoid most consistent with colitis. Diverticulosis. Signed by Ronal Marion MD    Assessment/Plan       Nguyen Ponce is a 58 y.o. female with a significant past medical history of COPD,  "hyperlipidemia, diabetes mellitus type 2, combined systolic and diastolic CHF who presented with abdominal pain and bloody bowel movements. GI was consulted for \" colitis\".        Abdominal pain/colitis  -Ischemic versus infectious versus inflammatory etiology  -Stool studies negative for acute infectious etiology  -Agree with Cipro and Flagyl for possible ischemic colitis patient's presentation seems consistent with this and with patient's history of tobacco abuse  -Okay for full liquid diet today from GI point of view   plan on colonoscopy 11/27/2023 for evaluation  -Continue with supportive care             Marin Wright, DO      "

## 2023-11-27 ENCOUNTER — TELEPHONE (OUTPATIENT)
Dept: GASTROENTEROLOGY | Facility: CLINIC | Age: 58
End: 2023-11-27

## 2023-11-27 ENCOUNTER — APPOINTMENT (OUTPATIENT)
Dept: OPERATING ROOM | Facility: HOSPITAL | Age: 58
End: 2023-11-27
Payer: COMMERCIAL

## 2023-11-27 ENCOUNTER — ANESTHESIA (OUTPATIENT)
Dept: OPERATING ROOM | Facility: HOSPITAL | Age: 58
End: 2023-11-27
Payer: COMMERCIAL

## 2023-11-27 PROBLEM — K52.9 COLITIS: Status: ACTIVE | Noted: 2023-11-27

## 2023-11-27 LAB
ANION GAP SERPL CALC-SCNC: 14 MMOL/L (ref 10–20)
BUN SERPL-MCNC: 18 MG/DL (ref 6–23)
CALCIUM SERPL-MCNC: 8.1 MG/DL (ref 8.6–10.3)
CHLORIDE SERPL-SCNC: 100 MMOL/L (ref 98–107)
CO2 SERPL-SCNC: 27 MMOL/L (ref 21–32)
CREAT SERPL-MCNC: 0.85 MG/DL (ref 0.5–1.05)
ERYTHROCYTE [DISTWIDTH] IN BLOOD BY AUTOMATED COUNT: 12.7 % (ref 11.5–14.5)
GFR SERPL CREATININE-BSD FRML MDRD: 80 ML/MIN/1.73M*2
GLUCOSE BLD MANUAL STRIP-MCNC: 111 MG/DL (ref 74–99)
GLUCOSE BLD MANUAL STRIP-MCNC: 123 MG/DL (ref 74–99)
GLUCOSE BLD MANUAL STRIP-MCNC: 126 MG/DL (ref 74–99)
GLUCOSE BLD MANUAL STRIP-MCNC: 154 MG/DL (ref 74–99)
GLUCOSE BLD MANUAL STRIP-MCNC: 98 MG/DL (ref 74–99)
GLUCOSE SERPL-MCNC: 101 MG/DL (ref 74–99)
HCT VFR BLD AUTO: 37.6 % (ref 36–46)
HGB BLD-MCNC: 12.6 G/DL (ref 12–16)
MCH RBC QN AUTO: 34.3 PG (ref 26–34)
MCHC RBC AUTO-ENTMCNC: 33.5 G/DL (ref 32–36)
MCV RBC AUTO: 103 FL (ref 80–100)
NRBC BLD-RTO: 0 /100 WBCS (ref 0–0)
PLATELET # BLD AUTO: 254 X10*3/UL (ref 150–450)
POTASSIUM SERPL-SCNC: 3.6 MMOL/L (ref 3.5–5.3)
RBC # BLD AUTO: 3.67 X10*6/UL (ref 4–5.2)
SODIUM SERPL-SCNC: 137 MMOL/L (ref 136–145)
WBC # BLD AUTO: 16.4 X10*3/UL (ref 4.4–11.3)

## 2023-11-27 PROCEDURE — 93010 ELECTROCARDIOGRAM REPORT: CPT | Performed by: INTERNAL MEDICINE

## 2023-11-27 PROCEDURE — 7100000002 HC RECOVERY ROOM TIME - EACH INCREMENTAL 1 MINUTE: Performed by: INTERNAL MEDICINE

## 2023-11-27 PROCEDURE — 88305 TISSUE EXAM BY PATHOLOGIST: CPT | Performed by: PATHOLOGY

## 2023-11-27 PROCEDURE — 85027 COMPLETE CBC AUTOMATED: CPT | Performed by: INTERNAL MEDICINE

## 2023-11-27 PROCEDURE — 2500000004 HC RX 250 GENERAL PHARMACY W/ HCPCS (ALT 636 FOR OP/ED): Performed by: NURSE ANESTHETIST, CERTIFIED REGISTERED

## 2023-11-27 PROCEDURE — 2500000002 HC RX 250 W HCPCS SELF ADMINISTERED DRUGS (ALT 637 FOR MEDICARE OP, ALT 636 FOR OP/ED): Performed by: INTERNAL MEDICINE

## 2023-11-27 PROCEDURE — 3700000002 HC GENERAL ANESTHESIA TIME - EACH INCREMENTAL 1 MINUTE: Performed by: INTERNAL MEDICINE

## 2023-11-27 PROCEDURE — 0753T DGTZ GLS MCRSCP SLD LEVEL IV: CPT | Mod: TC,SUR,PORLAB | Performed by: INTERNAL MEDICINE

## 2023-11-27 PROCEDURE — 80048 BASIC METABOLIC PNL TOTAL CA: CPT | Performed by: INTERNAL MEDICINE

## 2023-11-27 PROCEDURE — 2500000004 HC RX 250 GENERAL PHARMACY W/ HCPCS (ALT 636 FOR OP/ED): Performed by: ANESTHESIOLOGY

## 2023-11-27 PROCEDURE — 3700000001 HC GENERAL ANESTHESIA TIME - INITIAL BASE CHARGE: Performed by: INTERNAL MEDICINE

## 2023-11-27 PROCEDURE — 3600000007 HC OR TIME - EACH INCREMENTAL 1 MINUTE - PROCEDURE LEVEL TWO: Performed by: INTERNAL MEDICINE

## 2023-11-27 PROCEDURE — 2500000001 HC RX 250 WO HCPCS SELF ADMINISTERED DRUGS (ALT 637 FOR MEDICARE OP): Performed by: INTERNAL MEDICINE

## 2023-11-27 PROCEDURE — 96366 THER/PROPH/DIAG IV INF ADDON: CPT

## 2023-11-27 PROCEDURE — 2500000004 HC RX 250 GENERAL PHARMACY W/ HCPCS (ALT 636 FOR OP/ED): Performed by: INTERNAL MEDICINE

## 2023-11-27 PROCEDURE — 2500000001 HC RX 250 WO HCPCS SELF ADMINISTERED DRUGS (ALT 637 FOR MEDICARE OP): Performed by: ANESTHESIOLOGY

## 2023-11-27 PROCEDURE — 3600000002 HC OR TIME - INITIAL BASE CHARGE - PROCEDURE LEVEL TWO: Performed by: INTERNAL MEDICINE

## 2023-11-27 PROCEDURE — 36415 COLL VENOUS BLD VENIPUNCTURE: CPT | Performed by: INTERNAL MEDICINE

## 2023-11-27 PROCEDURE — 45380 COLONOSCOPY AND BIOPSY: CPT | Performed by: INTERNAL MEDICINE

## 2023-11-27 PROCEDURE — 82947 ASSAY GLUCOSE BLOOD QUANT: CPT

## 2023-11-27 PROCEDURE — 88305 TISSUE EXAM BY PATHOLOGIST: CPT | Mod: TC,PORLAB | Performed by: INTERNAL MEDICINE

## 2023-11-27 PROCEDURE — 96376 TX/PRO/DX INJ SAME DRUG ADON: CPT

## 2023-11-27 PROCEDURE — 99233 SBSQ HOSP IP/OBS HIGH 50: CPT | Performed by: INTERNAL MEDICINE

## 2023-11-27 PROCEDURE — G0378 HOSPITAL OBSERVATION PER HR: HCPCS

## 2023-11-27 PROCEDURE — 2500000005 HC RX 250 GENERAL PHARMACY W/O HCPCS: Performed by: NURSE ANESTHETIST, CERTIFIED REGISTERED

## 2023-11-27 PROCEDURE — 7100000001 HC RECOVERY ROOM TIME - INITIAL BASE CHARGE: Performed by: INTERNAL MEDICINE

## 2023-11-27 RX ORDER — SODIUM CITRATE AND CITRIC ACID MONOHYDRATE 334; 500 MG/5ML; MG/5ML
30 SOLUTION ORAL ONCE
Status: COMPLETED | OUTPATIENT
Start: 2023-11-27 | End: 2023-11-27

## 2023-11-27 RX ORDER — LIDOCAINE HCL/PF 100 MG/5ML
SYRINGE (ML) INTRAVENOUS AS NEEDED
Status: DISCONTINUED | OUTPATIENT
Start: 2023-11-27 | End: 2023-11-27

## 2023-11-27 RX ORDER — FENTANYL CITRATE 50 UG/ML
INJECTION, SOLUTION INTRAMUSCULAR; INTRAVENOUS AS NEEDED
Status: DISCONTINUED | OUTPATIENT
Start: 2023-11-27 | End: 2023-11-27

## 2023-11-27 RX ORDER — METOCLOPRAMIDE HYDROCHLORIDE 5 MG/ML
10 INJECTION INTRAMUSCULAR; INTRAVENOUS ONCE
Status: COMPLETED | OUTPATIENT
Start: 2023-11-27 | End: 2023-11-27

## 2023-11-27 RX ORDER — FAMOTIDINE 10 MG/ML
20 INJECTION INTRAVENOUS ONCE
Status: COMPLETED | OUTPATIENT
Start: 2023-11-27 | End: 2023-11-27

## 2023-11-27 RX ORDER — PROPOFOL 10 MG/ML
INJECTION, EMULSION INTRAVENOUS AS NEEDED
Status: DISCONTINUED | OUTPATIENT
Start: 2023-11-27 | End: 2023-11-27

## 2023-11-27 RX ORDER — SODIUM CHLORIDE 9 MG/ML
50 INJECTION, SOLUTION INTRAVENOUS CONTINUOUS
Status: DISCONTINUED | OUTPATIENT
Start: 2023-11-27 | End: 2023-11-28 | Stop reason: HOSPADM

## 2023-11-27 RX ADMIN — FAMOTIDINE 20 MG: 10 INJECTION, SOLUTION INTRAVENOUS at 12:17

## 2023-11-27 RX ADMIN — ATORVASTATIN CALCIUM 20 MG: 20 TABLET, FILM COATED ORAL at 20:24

## 2023-11-27 RX ADMIN — FENTANYL CITRATE 50 MCG: 50 INJECTION, SOLUTION INTRAMUSCULAR; INTRAVENOUS at 12:38

## 2023-11-27 RX ADMIN — SODIUM CITRATE AND CITRIC ACID MONOHYDRATE 30 ML: 500; 334 SOLUTION ORAL at 12:17

## 2023-11-27 RX ADMIN — CARVEDILOL 12.5 MG: 12.5 TABLET, FILM COATED ORAL at 18:40

## 2023-11-27 RX ADMIN — OXYCODONE HYDROCHLORIDE 7.5 MG: 5 TABLET ORAL at 18:40

## 2023-11-27 RX ADMIN — LIDOCAINE HYDROCHLORIDE 60 MG: 20 INJECTION INTRAVENOUS at 12:38

## 2023-11-27 RX ADMIN — Medication 3 MG: at 18:40

## 2023-11-27 RX ADMIN — MONTELUKAST 10 MG: 10 TABLET, FILM COATED ORAL at 20:24

## 2023-11-27 RX ADMIN — BUDESONIDE, GLYCOPYRROLATE, AND FORMOTEROL FUMARATE 2 PUFF: 160; 9; 4.8 AEROSOL, METERED RESPIRATORY (INHALATION) at 18:17

## 2023-11-27 RX ADMIN — PROPOFOL 50 MG: 10 INJECTION, EMULSION INTRAVENOUS at 12:43

## 2023-11-27 RX ADMIN — PROPOFOL 30 MG: 10 INJECTION, EMULSION INTRAVENOUS at 12:47

## 2023-11-27 RX ADMIN — BUDESONIDE, GLYCOPYRROLATE, AND FORMOTEROL FUMARATE 2 PUFF: 160; 9; 4.8 AEROSOL, METERED RESPIRATORY (INHALATION) at 06:54

## 2023-11-27 RX ADMIN — METRONIDAZOLE 500 MG: 500 INJECTION, SOLUTION INTRAVENOUS at 19:34

## 2023-11-27 RX ADMIN — FLUTICASONE PROPIONATE 2 SPRAY: 50 SPRAY, METERED NASAL at 20:27

## 2023-11-27 RX ADMIN — METRONIDAZOLE 500 MG: 500 INJECTION, SOLUTION INTRAVENOUS at 02:02

## 2023-11-27 RX ADMIN — SODIUM CHLORIDE, SODIUM LACTATE, POTASSIUM CHLORIDE, AND CALCIUM CHLORIDE: 600; 310; 30; 20 INJECTION, SOLUTION INTRAVENOUS at 12:34

## 2023-11-27 RX ADMIN — NITROGLYCERIN 0.5 INCH: 20 OINTMENT TOPICAL at 12:30

## 2023-11-27 RX ADMIN — CEFTRIAXONE SODIUM 1 G: 1 INJECTION, SOLUTION INTRAVENOUS at 18:25

## 2023-11-27 RX ADMIN — SODIUM CHLORIDE 50 ML/HR: 9 INJECTION, SOLUTION INTRAVENOUS at 12:21

## 2023-11-27 RX ADMIN — SODIUM CHLORIDE, SODIUM LACTATE, POTASSIUM CHLORIDE, AND CALCIUM CHLORIDE: 600; 310; 30; 20 INJECTION, SOLUTION INTRAVENOUS at 13:00

## 2023-11-27 RX ADMIN — FENTANYL CITRATE 50 MCG: 50 INJECTION, SOLUTION INTRAMUSCULAR; INTRAVENOUS at 12:41

## 2023-11-27 RX ADMIN — METRONIDAZOLE 500 MG: 500 INJECTION, SOLUTION INTRAVENOUS at 11:22

## 2023-11-27 RX ADMIN — METOCLOPRAMIDE 10 MG: 5 INJECTION, SOLUTION INTRAMUSCULAR; INTRAVENOUS at 12:17

## 2023-11-27 SDOH — HEALTH STABILITY: MENTAL HEALTH: CURRENT SMOKER: 0

## 2023-11-27 ASSESSMENT — COGNITIVE AND FUNCTIONAL STATUS - GENERAL
MOBILITY SCORE: 24
DAILY ACTIVITIY SCORE: 24
MOBILITY SCORE: 24
DAILY ACTIVITIY SCORE: 24

## 2023-11-27 ASSESSMENT — PAIN SCALES - GENERAL
PAINLEVEL_OUTOF10: 0 - NO PAIN
PAINLEVEL_OUTOF10: 3
PAINLEVEL_OUTOF10: 0 - NO PAIN
PAINLEVEL_OUTOF10: 9
PAIN_LEVEL: 0

## 2023-11-27 ASSESSMENT — PAIN - FUNCTIONAL ASSESSMENT
PAIN_FUNCTIONAL_ASSESSMENT: 0-10

## 2023-11-27 ASSESSMENT — ACTIVITIES OF DAILY LIVING (ADL): LACK_OF_TRANSPORTATION: NO

## 2023-11-27 NOTE — ANESTHESIA PREPROCEDURE EVALUATION
Patient: Nguyen Ponce    Procedure Information       Date/Time: 11/27/23 1215    Procedure: COLONOSCOPY    Location: [facility] OR            Relevant Problems   Cardiovascular   (+) Benign essential hypertension   (+) Chronic combined systolic and diastolic CHF, NYHA class 3 (CMS/[facility])   (+) Congestive heart failure (CMS/[facility])   (+) Hyperlipidemia   (+) Hypertension   (+) PVD (peripheral vascular disease) (CMS/[facility])   (+) Systolic heart failure, chronic (CMS/[facility])      Endocrine   (+) Morbid obesity (CMS/[facility])   (+) Type 2 diabetes mellitus (CMS/[facility])   (+) Type 2 diabetes mellitus without complication (CMS/[facility])      Neuro/Psych   (+) Anxiety   (+) Bilateral low back pain with bilateral sciatica   (+) Cervical radiculopathy, chronic   (+) Depression      Pulmonary   (+) COPD (chronic obstructive pulmonary disease) (CMS/[facility])   (+) SOB (shortness of breath) on exertion      Musculoskeletal   (+) Cervical spondylosis   (+) Degeneration of intervertebral disc of cervical region   (+) Degeneration of intervertebral disc of lumbar region   (+) Lumbar spondylosis       Clinical information reviewed:   Tobacco  Allergies  Meds  Problems  Med Hx  Surg Hx  OB Status    Fam Hx  Soc Hx        NPO Detail:  NPO/Void Status  Date of Last Liquid: 11/26/23  Time of Last Liquid: 2230  Date of Last Solid: 11/24/23  Time of Last Solid: 1700  Last Intake Type: Clear fluids; GI prep         Physical Exam    Airway  Mallampati: II  TM distance: >3 FB  Neck ROM: full     Cardiovascular - normal exam     Dental    Pulmonary - normal exam     Abdominal - normal exam             Anesthesia Plan    ASA 3     MAC     The patient is not a current smoker.    intravenous induction   Anesthetic plan and risks discussed with patient.

## 2023-11-27 NOTE — TELEPHONE ENCOUNTER
----- Message from Gosia Hartmann RN sent at 11/24/2023 12:02 PM EST -----  Regarding: Colonoscopy screening  Open access. Diabetic

## 2023-11-27 NOTE — PROGRESS NOTES
Nguyen Ponce is a 58 y.o. female on day 2 of admission presenting with Acute colitis.    Subjective   Nguyen Ponce is a 58 y.o. female with a PMH of DM2, HTN, MDD/LICHA, HLD, COPD, combined systolic and diastolic CHF and Obesity presenting with abdominal pain and BRBPR.      Patient states that on Friday evening she began to experience abdominal cramps and the urge to defecate without the ability to do so. This persisted then eventually patient had a BM that was shimon blood. She then had multiple bloody bowel movements thereafter prompting her to come to the ED. In the ED she was noted to have leukocytosis and CTAP revealed colitis of the L colon and sigmoid colon.    11/26: She is much improved today. Pain nearly resolved. Scr worsening. Starting IVF. GI plans on colonoscopy in the AM.     11/27: Colonoscopy consistent with ischemic colitis. Mesentery US pending.          Objective     Physical Exam  Vitals and nursing note reviewed.   Constitutional:       General: She is not in acute distress.     Appearance: She is not ill-appearing or toxic-appearing.   HENT:      Head: Normocephalic and atraumatic.   Cardiovascular:      Rate and Rhythm: Normal rate and regular rhythm.      Pulses: Normal pulses.      Heart sounds: Normal heart sounds. No murmur heard.     No friction rub. No gallop.   Pulmonary:      Effort: Pulmonary effort is normal. No respiratory distress.      Breath sounds: No wheezing, rhonchi or rales.   Abdominal:      General: Abdomen is flat. Bowel sounds are normal. There is no distension.      Palpations: Abdomen is soft.      Tenderness: There is abdominal tenderness. There is no guarding or rebound.      Comments: mild TTP worst in the LUQ and LLQ   Musculoskeletal:      Cervical back: Neck supple. No rigidity or tenderness.   Skin:     General: Skin is warm and dry.   Neurological:      General: No focal deficit present.   Psychiatric:         Mood and Affect: Mood normal.     Last  "Recorded Vitals  Blood pressure (!) 146/95, pulse 72, temperature 36.8 °C (98.2 °F), temperature source Temporal, resp. rate 16, height 1.651 m (5' 5\"), weight 107 kg (235 lb 14.3 oz), SpO2 100 %.  Intake/Output last 3 Shifts:  I/O last 3 completed shifts:  In: 5824.2 (54.4 mL/kg) [P.O.:5420; I.V.:154.2 (1.4 mL/kg); IV Piggyback:250]  Out: 2 (0 mL/kg) [Stool:2]  Weight: 107 kg     Relevant Results                             Assessment/Plan   Acute ischemic colitis resulting in BRBPR   -CT, prodrome, and leukocytosis all indicative of acute colitis.   -C diff and enteric panel negative  -Continue ceftriaxone and flagyl D3  -GI consulting, 11/27 Colonoscopy consistent with ischemic colitis. Mesentery US pending.      CARRIE  -likely prerenal in the setting of poor PO intake and diarrhea 2/2 above.   -resolved with volume repletion      Active Problems:    Benign essential hypertension: holding all but coreg with normal BP and possible active bleeding.     Chronic combined systolic and diastolic CHF, NYHA class 3 (CMS/HCC)    COPD (chronic obstructive pulmonary disease) (CMS/HCC)    Hyperlipidemia    Type 2 diabetes mellitus (CMS/HCC)    Obesity (BMI 30-39.9)    Judd Ramirez MD PhD      "

## 2023-11-27 NOTE — PROGRESS NOTES
11/27/23 1253   Discharge Planning   Living Arrangements Spouse/significant other   Support Systems Spouse/significant other   Assistance Needed Independent   Type of Residence Private residence   Home or Post Acute Services None   Patient expects to be discharged to: Home   Does the patient need discharge transport arranged? No   Financial Resource Strain   How hard is it for you to pay for the very basics like food, housing, medical care, and heating? Not hard   Housing Stability   In the last 12 months, was there a time when you were not able to pay the mortgage or rent on time? N   In the last 12 months, was there a time when you did not have a steady place to sleep or slept in a shelter (including now)? N   Transportation Needs   In the past 12 months, has lack of transportation kept you from medical appointments or from getting medications? no   In the past 12 months, has lack of transportation kept you from meetings, work, or from getting things needed for daily living? No     PCP is Angelique Hauser. Patient plans to return home with no needs upon discharge.

## 2023-11-27 NOTE — ANESTHESIA POSTPROCEDURE EVALUATION
Patient: Nguyen Ponce    Procedure Summary       Date: 11/27/23 Room / Location: [facility] OR    Anesthesia Start: 1232 Anesthesia Stop: 1302    Procedure: COLONOSCOPY Diagnosis: Acute colitis    Scheduled Providers: Brian Lezama MD Responsible Provider: TIMMY Shankar    Anesthesia Type: MAC ASA Status: 3            Anesthesia Type: MAC    Vitals Value Taken Time   /74 11/27/23 1311   Temp 36.8 °C (98.2 °F) 11/27/23 1258   Pulse 69 11/27/23 1312   Resp 14 11/27/23 1312   SpO2 99 % 11/27/23 1312   Vitals shown include unvalidated device data.    Anesthesia Post Evaluation    Patient location during evaluation: PACU  Patient participation: complete - patient participated  Level of consciousness: awake and alert  Pain score: 0  Pain management: adequate  Airway patency: patent  Cardiovascular status: acceptable  Respiratory status: acceptable  Hydration status: acceptable  Postoperative Nausea and Vomiting: none        No notable events documented.

## 2023-11-27 NOTE — POST-PROCEDURE NOTE
Colonoscopy completed. Full report in EMR.      Colonoscopy revealed localized inflammatory changes in the splenic flexure/descending colon consistent with ischemic colitis. Biopsies obtained. Colonoscope not advanced beyond splenic flexure due to presence/degree of inflammatory changes. Polyps also identified and not resected due to current presentation with ischemic colitis.        Recommendations:  - advance diet as tolerated  - complete course of antibiotics  - monitor medications/cardiac disease to avoid further ischemic insults  - consider mesenteric US/doppler to evaluate for underlying vascular disease        Brian Lezama MD    Gastroenterology    [facility]    Clinical   [facility]      
none

## 2023-11-28 ENCOUNTER — APPOINTMENT (OUTPATIENT)
Dept: CARDIOLOGY | Facility: HOSPITAL | Age: 58
End: 2023-11-28
Payer: COMMERCIAL

## 2023-11-28 ENCOUNTER — PHARMACY VISIT (OUTPATIENT)
Dept: PHARMACY | Facility: CLINIC | Age: 58
End: 2023-11-28
Payer: MEDICAID

## 2023-11-28 ENCOUNTER — APPOINTMENT (OUTPATIENT)
Dept: VASCULAR MEDICINE | Facility: HOSPITAL | Age: 58
End: 2023-11-28
Payer: COMMERCIAL

## 2023-11-28 VITALS
RESPIRATION RATE: 18 BRPM | SYSTOLIC BLOOD PRESSURE: 116 MMHG | DIASTOLIC BLOOD PRESSURE: 82 MMHG | OXYGEN SATURATION: 93 % | BODY MASS INDEX: 39.3 KG/M2 | HEART RATE: 80 BPM | HEIGHT: 65 IN | WEIGHT: 235.89 LBS | TEMPERATURE: 97.7 F

## 2023-11-28 PROBLEM — K52.9 COLITIS: Status: RESOLVED | Noted: 2023-11-27 | Resolved: 2023-11-28

## 2023-11-28 LAB
ANION GAP SERPL CALC-SCNC: 10 MMOL/L (ref 10–20)
ATRIAL RATE: 82 BPM
BLOOD EXPIRATION DATE: NORMAL
BLOOD EXPIRATION DATE: NORMAL
BUN SERPL-MCNC: 13 MG/DL (ref 6–23)
CALCIUM SERPL-MCNC: 8.1 MG/DL (ref 8.6–10.3)
CALPROTECTIN STL-MCNT: 1970 UG/G
CHLORIDE SERPL-SCNC: 102 MMOL/L (ref 98–107)
CO2 SERPL-SCNC: 30 MMOL/L (ref 21–32)
CREAT SERPL-MCNC: 0.75 MG/DL (ref 0.5–1.05)
DISPENSE STATUS: NORMAL
DISPENSE STATUS: NORMAL
ERYTHROCYTE [DISTWIDTH] IN BLOOD BY AUTOMATED COUNT: 12.5 % (ref 11.5–14.5)
GFR SERPL CREATININE-BSD FRML MDRD: >90 ML/MIN/1.73M*2
GLUCOSE BLD MANUAL STRIP-MCNC: 123 MG/DL (ref 74–99)
GLUCOSE BLD MANUAL STRIP-MCNC: 131 MG/DL (ref 74–99)
GLUCOSE SERPL-MCNC: 122 MG/DL (ref 74–99)
HCT VFR BLD AUTO: 36.6 % (ref 36–46)
HGB BLD-MCNC: 12.3 G/DL (ref 12–16)
MCH RBC QN AUTO: 34.6 PG (ref 26–34)
MCHC RBC AUTO-ENTMCNC: 33.6 G/DL (ref 32–36)
MCV RBC AUTO: 103 FL (ref 80–100)
NRBC BLD-RTO: 0 /100 WBCS (ref 0–0)
P AXIS: 5 DEGREES
PLATELET # BLD AUTO: 247 X10*3/UL (ref 150–450)
POTASSIUM SERPL-SCNC: 3.5 MMOL/L (ref 3.5–5.3)
PR INTERVAL: 192 MS
PRODUCT BLOOD TYPE: 6200
PRODUCT BLOOD TYPE: 6200
PRODUCT CODE: NORMAL
PRODUCT CODE: NORMAL
Q ONSET: 252 MS
QRS COUNT: 13 BEATS
QRS DURATION: 112 MS
QT INTERVAL: 389 MS
QTC CALCULATION(BAZETT): 455 MS
QTC FREDERICIA: 431 MS
R AXIS: -14 DEGREES
RBC # BLD AUTO: 3.56 X10*6/UL (ref 4–5.2)
SODIUM SERPL-SCNC: 138 MMOL/L (ref 136–145)
T AXIS: 22 DEGREES
T OFFSET: 446 MS
UNIT ABO: NORMAL
UNIT ABO: NORMAL
UNIT NUMBER: NORMAL
UNIT NUMBER: NORMAL
UNIT RH: NORMAL
UNIT RH: NORMAL
UNIT VOLUME: 350
UNIT VOLUME: 350
VENTRICULAR RATE: 82 BPM
WBC # BLD AUTO: 11 X10*3/UL (ref 4.4–11.3)
XM INTEP: NORMAL
XM INTEP: NORMAL

## 2023-11-28 PROCEDURE — 80048 BASIC METABOLIC PNL TOTAL CA: CPT | Performed by: INTERNAL MEDICINE

## 2023-11-28 PROCEDURE — 2500000004 HC RX 250 GENERAL PHARMACY W/ HCPCS (ALT 636 FOR OP/ED): Mod: JZ | Performed by: INTERNAL MEDICINE

## 2023-11-28 PROCEDURE — 82947 ASSAY GLUCOSE BLOOD QUANT: CPT

## 2023-11-28 PROCEDURE — 93975 VASCULAR STUDY: CPT | Performed by: SURGERY

## 2023-11-28 PROCEDURE — 2500000002 HC RX 250 W HCPCS SELF ADMINISTERED DRUGS (ALT 637 FOR MEDICARE OP, ALT 636 FOR OP/ED): Performed by: INTERNAL MEDICINE

## 2023-11-28 PROCEDURE — 36415 COLL VENOUS BLD VENIPUNCTURE: CPT | Performed by: INTERNAL MEDICINE

## 2023-11-28 PROCEDURE — 99239 HOSP IP/OBS DSCHRG MGMT >30: CPT | Performed by: INTERNAL MEDICINE

## 2023-11-28 PROCEDURE — 2500000001 HC RX 250 WO HCPCS SELF ADMINISTERED DRUGS (ALT 637 FOR MEDICARE OP): Performed by: INTERNAL MEDICINE

## 2023-11-28 PROCEDURE — 96366 THER/PROPH/DIAG IV INF ADDON: CPT

## 2023-11-28 PROCEDURE — RXMED WILLOW AMBULATORY MEDICATION CHARGE

## 2023-11-28 PROCEDURE — 99232 SBSQ HOSP IP/OBS MODERATE 35: CPT | Performed by: PHYSICIAN ASSISTANT

## 2023-11-28 PROCEDURE — G0378 HOSPITAL OBSERVATION PER HR: HCPCS

## 2023-11-28 PROCEDURE — 93975 VASCULAR STUDY: CPT

## 2023-11-28 PROCEDURE — 85027 COMPLETE CBC AUTOMATED: CPT | Performed by: INTERNAL MEDICINE

## 2023-11-28 PROCEDURE — 93005 ELECTROCARDIOGRAM TRACING: CPT

## 2023-11-28 RX ORDER — DULAGLUTIDE 0.75 MG/.5ML
0.75 INJECTION, SOLUTION SUBCUTANEOUS
Start: 2023-11-28 | End: 2023-12-15 | Stop reason: SDUPTHER

## 2023-11-28 RX ORDER — METRONIDAZOLE 500 MG/1
500 TABLET ORAL 3 TIMES DAILY
Qty: 21 TABLET | Refills: 0 | Status: SHIPPED | OUTPATIENT
Start: 2023-11-28 | End: 2023-12-11 | Stop reason: ALTCHOICE

## 2023-11-28 RX ORDER — CEFDINIR 300 MG/1
300 CAPSULE ORAL 2 TIMES DAILY
Qty: 14 CAPSULE | Refills: 0 | Status: SHIPPED | OUTPATIENT
Start: 2023-11-28 | End: 2023-12-11 | Stop reason: ALTCHOICE

## 2023-11-28 RX ORDER — ATORVASTATIN CALCIUM 40 MG/1
40 TABLET, FILM COATED ORAL DAILY
Qty: 30 TABLET | Refills: 11 | Status: SHIPPED | OUTPATIENT
Start: 2023-11-28 | End: 2024-11-27

## 2023-11-28 RX ORDER — ASPIRIN 81 MG/1
81 TABLET ORAL DAILY
Qty: 30 TABLET | Refills: 11 | Status: SHIPPED | OUTPATIENT
Start: 2023-11-28 | End: 2024-11-27

## 2023-11-28 RX ADMIN — BUDESONIDE, GLYCOPYRROLATE, AND FORMOTEROL FUMARATE 2 PUFF: 160; 9; 4.8 AEROSOL, METERED RESPIRATORY (INHALATION) at 06:14

## 2023-11-28 RX ADMIN — CARVEDILOL 12.5 MG: 12.5 TABLET, FILM COATED ORAL at 08:13

## 2023-11-28 RX ADMIN — LORATADINE 10 MG: 10 TABLET ORAL at 08:13

## 2023-11-28 RX ADMIN — METRONIDAZOLE 500 MG: 500 INJECTION, SOLUTION INTRAVENOUS at 10:03

## 2023-11-28 RX ADMIN — METRONIDAZOLE 500 MG: 500 INJECTION, SOLUTION INTRAVENOUS at 01:46

## 2023-11-28 RX ADMIN — FLUTICASONE PROPIONATE 2 SPRAY: 50 SPRAY, METERED NASAL at 08:52

## 2023-11-28 ASSESSMENT — PAIN SCALES - GENERAL: PAINLEVEL_OUTOF10: 0 - NO PAIN

## 2023-11-28 ASSESSMENT — PAIN - FUNCTIONAL ASSESSMENT: PAIN_FUNCTIONAL_ASSESSMENT: 0-10

## 2023-11-28 NOTE — DISCHARGE SUMMARY
DISCHARGE SUMMARY     Discharge Diagnosis  Acute colitis    Issues Requiring Follow-Up  OP elective colonoscopy to address polyps seen on IP colonoscopy on 11/27.     This discharge took greater than 35 minutes.    Test Results Pending At Discharge  Pending Labs       Order Current Status    Extra Urine Gray Tube Collected (11/25/23 0869)    Calprotectin Stool In process    Surgical Pathology Exam In process    Urinalysis with Reflex Microscopic and Culture In process            Hospital Course   Nguyen Ponce is a 58 y.o. female with a PMH of DM2, HTN, MDD/LICHA, HLD, COPD, combined systolic and diastolic CHF and Obesity presenting with abdominal pain and BRBPR.      Patient states that on Friday evening she began to experience abdominal cramps and the urge to defecate without the ability to do so. This persisted then eventually patient had a BM that was shimon blood. She then had multiple bloody bowel movements thereafter prompting her to come to the ED. In the ED she was noted to have leukocytosis and CTAP revealed colitis of the L colon and sigmoid colon.     11/26: She is much improved today. Pain nearly resolved. Scr worsening. Starting IVF. GI plans on colonoscopy in the AM.      11/27: Colonoscopy consistent with ischemic colitis. Mesentery US pending.     Acute ischemic colitis resulting in BRBPR   -CT, prodrome, and leukocytosis all indicative of acute colitis.   -C diff and enteric panel negative  -Transition to cefdinir and flagyl on DC to complete the course of treatment   -GI consulting, 11/27 Colonoscopy consistent with ischemic colitis. Mesentery US prelim read without obstruction.   -Start baby ASA and increase to HI statin.      CARRIE  -likely prerenal in the setting of poor PO intake and diarrhea 2/2 above.   -resolved with volume repletion      Active Problems:    Benign essential hypertension: holding all but coreg with normal BP and possible active bleeding.     Chronic combined systolic and  diastolic CHF, NYHA class 3 (CMS/[facility])    COPD (chronic obstructive pulmonary disease) (CMS/[facility])    Hyperlipidemia    Type 2 diabetes mellitus (CMS/[facility])    Obesity (BMI 30-39.9)    Pertinent Physical Exam At Time of Discharge  Physical Exam  Vitals and nursing note reviewed.   Constitutional:       General: She is not in acute distress.     Appearance: She is not ill-appearing or toxic-appearing.   HENT:      Head: Normocephalic and atraumatic.   Cardiovascular:      Rate and Rhythm: Normal rate and regular rhythm.      Pulses: Normal pulses.      Heart sounds: Normal heart sounds. No murmur heard.     No friction rub. No gallop.   Pulmonary:      Effort: Pulmonary effort is normal. No respiratory distress.      Breath sounds: No wheezing, rhonchi or rales.   Abdominal:      General: Abdomen is flat. Bowel sounds are normal. There is no distension.      Palpations: Abdomen is soft.      Tenderness: There is no abdominal tenderness.   Musculoskeletal:      Cervical back: Neck supple. No rigidity or tenderness.   Skin:     General: Skin is warm and dry.   Neurological:      General: No focal deficit present.   Psychiatric:         Mood and Affect: Mood normal.         Home Medications     Medication List      START taking these medications     aspirin 81 mg EC tablet; Take 1 tablet (81 mg) by mouth once daily.   cefdinir 300 mg capsule; Commonly known as: Omnicef; Take 1 capsule (300   mg) by mouth 2 times a day for 7 days.   metroNIDAZOLE 500 mg tablet; Commonly known as: Flagyl; Take 1 tablet   (500 mg) by mouth 3 times a day for 7 days. DO NOT consume alcohol while   taking this medication.     CHANGE how you take these medications     atorvastatin 40 mg tablet; Commonly known as: Lipitor; Take 1 tablet (40   mg) by mouth once daily.; What changed: medication strength, how much to   take, when to take this     CONTINUE taking these medications     AdviL 200 mg tablet; Generic drug: ibuprofen   albuterol 90  mcg/actuation inhaler; Inhale 2 puffs every 4 hours if   needed for wheezing or shortness of breath.   Breztri Aerosphere 160-9-4.8 mcg/actuation HFA aerosol inhaler; Generic   drug: budesonide-glycopyr-formoterol; Inhale 2 puffs 2 times a day.   carvedilol 12.5 mg tablet; Commonly known as: Coreg   fluticasone 50 mcg/actuation nasal spray; Commonly known as: Flonase   furosemide 40 mg tablet; Commonly known as: Lasix; TAKE ONE TABLET BY   MOUTH DAILY   ipratropium-albuteroL 0.5-2.5 mg/3 mL nebulizer solution; Commonly known   as: Duo-Neb   loratadine 10 mg tablet; Commonly known as: Claritin   montelukast 10 mg tablet; Commonly known as: Singulair; TAKE ONE TABLET   BY MOUTH AT BEDTIME   multivitamin tablet   olmesartan 40 mg tablet; Commonly known as: BENIcar   OneTouch Delica Plus Lancet 33 gauge misc; Generic drug: lancets   potassium chloride ER 10 mEq ER capsule; Commonly known as: Micro-K   Trulicity 0.75 mg/0.5 mL pen injector; Generic drug: dulaglutide; Inject   0.75 mg under the skin 1 (one) time per week. On Saturdays       Outpatient Follow-Up  No follow-ups on file.     Judd Ramirez MD PhD  11/28/2023  9:55 AM

## 2023-11-28 NOTE — PROGRESS NOTES
"Nguyen Ponce is a 58 y.o. female on day 2 of admission presenting with Acute colitis.    Subjective   Patient seen and examined. No acute issues overnight. She states that she is feeling well today. She is not having abdominal pain. Has had 3 small BM without blood or melena. Denies N/V. Getting ready to go have mesenteric duplex ultrasound done.    10 point ROS obtained and negative other than discussed above.       Objective     Physical Exam  Vitals reviewed.   Constitutional:       General: She is not in acute distress.     Appearance: Normal appearance.   Cardiovascular:      Rate and Rhythm: Normal rate and regular rhythm.   Pulmonary:      Effort: Pulmonary effort is normal.      Breath sounds: Normal breath sounds.   Abdominal:      General: Bowel sounds are normal. There is no distension.      Palpations: Abdomen is soft.      Tenderness: There is no abdominal tenderness. There is no guarding or rebound.   Neurological:      General: No focal deficit present.      Mental Status: She is alert and oriented to person, place, and time.   Psychiatric:         Mood and Affect: Mood normal.         Behavior: Behavior normal.         Last Recorded Vitals  Blood pressure 116/82, pulse 80, temperature 36.5 °C (97.7 °F), temperature source Temporal, resp. rate 18, height 1.651 m (5' 5\"), weight 107 kg (235 lb 14.3 oz), SpO2 93 %.  Intake/Output last 3 Shifts:  I/O last 3 completed shifts:  In: 5790 (54.1 mL/kg) [P.O.:4740; I.V.:600 (5.6 mL/kg); IV Piggyback:450]  Out: 0 (0 mL/kg)   Weight: 107 kg     Relevant Results        Colonoscopy Diagnostic    Result Date: 11/27/2023  Impression Edematous, erythematous and ulcerated mucosa in the splenic flexure and descending colon, consistent with ischemic colitis; performed cold forceps biopsy Diverticulosis in the sigmoid colon Multiple polyps in the sigmoid colon. Not resected due to presentation with bleeding and presence of ischemic changes.   Findings Localized " edematous, erythematous and ulcerated mucosa in the splenic flexure and descending colon, consistent with ischemic colitis; performed cold forceps biopsy; Multiple diverticula in the sigmoid colon Multiple semi-pedunculated polyps in the sigmoid colon. Not resected due to presentation with bleeding and presence of ischemic changes.  Recommendation  Await pathology results  Repeat colonoscopy for screening purposes and for resection of visualized polyps. Given multiple medium/large polyps with tight/angulated sigmoid due to diverticulosis I would recommend that therapeutic colon[facility] be done at [facility].      CT abdomen pelvis w IV contrast    Result Date: 11/25/2023  STUDY: CT Abdomen and Pelvis with IV Contrast; 11/25/2023 8:24 AM INDICATION: Bloody stools. COMPARISON: CTA chest 07/27/2021. ACCESSION NUMBER(S): [id number] ORDERING CLINICIAN: ISAAC GARCIA TECHNIQUE: CT of the abdomen and pelvis was performed.  Contiguous axial images were obtained at 3 mm slice thickness through the abdomen and pelvis. Coronal and sagittal reconstructions at 3 mm slice thickness were performed.  Omnipaque 350 75 mL was administered intravenously.  FINDINGS: LOWER CHEST: No cardiomegaly.  No pericardial effusion.  Lung bases are clear.  ABDOMEN:  LIVER: No hepatomegaly.  Smooth surface contour.  Normal attenuation.  BILE DUCTS: There is mild biliary duct dilatation but this is often seen in a postcholecystectomy patient. GALLBLADDER: The gallbladder is absent. STOMACH: No abnormalities identified.  PANCREAS: No masses or ductal dilatation.  SPLEEN: No splenomegaly or focal splenic lesion.  ADRENAL GLANDS: There is thickening of the left adrenal gland most likely representing hyperplasia.  KIDNEYS AND URETERS: Kidneys are normal in size and location.  No renal or ureteral calculi.  PELVIS:  BLADDER: No abnormalities identified.  REPRODUCTIVE ORGANS: No abnormalities identified.  BOWEL: There is diverticulosis.  There is abnormal  "thickening of the wall the colon with edema.  This begins in the splenic flexure extends to the sigmoid colon.  Although there are diverticula present, this has more the appearance of colitis.  The appendix is identified and is normal.  VESSELS: No abnormalities identified.  Abdominal aorta is normal in caliber.  PERITONEUM/RETROPERITONEUM/LYMPH NODES: No free fluid.  No pneumoperitoneum. No lymphadenopathy.  ABDOMINAL WALL: No abnormalities identified. SOFT TISSUES: No abnormalities identified.  BONES: There are degenerative change with spinal stenosis at L4-5.  No acute fracture or aggressive osseous lesion.    Abnormal thickening of the wall of the colon with edema beginning in the splenic flexure extending to the sigmoid most consistent with colitis. Diverticulosis. Signed by Ronal Marion MD             Assessment/Plan   Principal Problem:    Acute colitis  Active Problems:    Benign essential hypertension    Anxiety    Chronic combined systolic and diastolic CHF, NYHA class 3 (CMS/[facility])    COPD (chronic obstructive pulmonary disease) (CMS/[facility])    Hyperlipidemia    PVD (peripheral vascular disease) (CMS/[facility])    Restless legs    Type 2 diabetes mellitus (CMS/[facility])    Obesity (BMI 30-39.9)    Colitis    Nguyen Ponce is a 58 y.o. female with a significant past medical history of COPD, hyperlipidemia, diabetes mellitus type 2, combined systolic and diastolic CHF who presented with abdominal pain and bloody bowel movements. GI was consulted for \" colitis\".      Ischemic colitis  Patient presented with abdominal pain and rectal bleeding. Infectious stool studies negative. Imaging/presentation consistent with ischemic colitis and she has been on antibiotics for treatment. Colonoscopy on 11/27 showed inflammatory changes in the splenic flexure/descending colon consistent with ischemic colitis. Biopsies pending. Overall, her symptoms have improved. She is no longer having abdominal pain or rectal bleeding.   - Recommend " completing course of antibiotics  - mesenteric/doppler US to evaluate for underlying vascular disease pending      Case was reviewed with Dr. Lezama.      Margi Strickland PA-C

## 2023-11-29 ENCOUNTER — PATIENT OUTREACH (OUTPATIENT)
Dept: PRIMARY CARE | Facility: CLINIC | Age: 58
End: 2023-11-29
Payer: COMMERCIAL

## 2023-11-29 ENCOUNTER — TELEPHONE (OUTPATIENT)
Dept: PRIMARY CARE | Facility: CLINIC | Age: 58
End: 2023-11-29
Payer: COMMERCIAL

## 2023-11-29 NOTE — PROGRESS NOTES
Discharge Facility: [facility]   Discharge Diagnosis: Acute colitis   Admission Date: 11-25-23  Discharge Date: 11-28-23    PCP Appointment Date: Message routed to office for scheduling   Specialist Appointment Date: Cardiology 12-22 -23   Urology 4-16-23  Hospital Encounter and Summary: Linked or  See discharge assessment below for further details  Engagement  Call Start Time: 0917 (11/29/2023  9:17 AM)    Medications  Medications reviewed with patient/caregiver?: Yes (11/29/2023  9:17 AM)  Is the patient having any side effects they believe may be caused by any medication additions or changes?: No (11/29/2023  9:17 AM)  Does the patient have all medications ordered at discharge?: Yes (11/29/2023  9:17 AM)  Care Management Interventions: No intervention needed (11/29/2023  9:17 AM)  Is the patient taking all medications as directed (includes completed medication regime)?: Yes (11/29/2023  9:17 AM)  Care Management Interventions: Provided patient education (11/29/2023  9:17 AM)    Appointments  Does the patient have a primary care provider?: Yes (11/29/2023  9:17 AM)  Care Management Interventions: Verified appointment date/time/provider (11/29/2023  9:17 AM)  Has the patient kept scheduled appointments due by today?: Yes (11/29/2023  9:17 AM)  Care Management Interventions: Advised patient to keep appointment (11/29/2023  9:17 AM)    Self Management  Has home health visited the patient within 72 hours of discharge?: Not applicable (11/29/2023  9:17 AM)    Patient Teaching  Does the patient have access to their discharge instructions?: Yes (11/29/2023  9:17 AM)  Care Management Interventions: Reviewed instructions with patient (11/29/2023  9:17 AM)  What is the patient's perception of their health status since discharge?: Worsening (11/29/2023  9:17 AM)  Is the patient/caregiver able to teach back the hierarchy of who to call/visit for symptoms/problems? PCP, Specialist, Home Health nurse, Urgent Care, ED, 911: Yes  (11/29/2023  9:17 AM)      Rad Molina LPN

## 2023-11-29 NOTE — TELEPHONE ENCOUNTER
----- Message from HORACE Duenas sent at 11/29/2023  2:32 PM EST -----  Regarding: RE: Hospital d/c  Ok to find a follow up appointment. Thank you!   ----- Message -----  From: Madisyn Mccarthy CMA  Sent: 11/29/2023  10:37 AM EST  To: HORACE Duenas  Subject: FW: Hospital d/c                                 Do you want to work in?  ----- Message -----  From: Vale Kennedy MA  Sent: 11/29/2023   9:27 AM EST  To: Madisyn Mccarthy CMA  Subject: FW: Hospital d/c                                   ----- Message -----  From: Rad Molina LPN  Sent: 11/29/2023   9:24 AM EST  [facility]  Subject: Hospital d/c                                     Discharge Facility: [facility]   Discharge Diagnosis: Acute colitis   Admission Date: 11-25-23  Discharge Date: 11-28-23    No PCP appointments available within 14 days of discharge.   Please reach out to patient and schedule an appointment within 7-13 days from discharge date.      Rad Molina LPN

## 2023-12-06 LAB
LABORATORY COMMENT REPORT: NORMAL
PATH REPORT.FINAL DX SPEC: NORMAL
PATH REPORT.GROSS SPEC: NORMAL
PATH REPORT.RELEVANT HX SPEC: NORMAL
PATH REPORT.TOTAL CANCER: NORMAL

## 2023-12-07 DIAGNOSIS — J44.9 CHRONIC OBSTRUCTIVE PULMONARY DISEASE, UNSPECIFIED COPD TYPE (MULTI): ICD-10-CM

## 2023-12-07 DIAGNOSIS — E11.9 TYPE 2 DIABETES MELLITUS WITHOUT COMPLICATION, WITHOUT LONG-TERM CURRENT USE OF INSULIN (MULTI): ICD-10-CM

## 2023-12-07 DIAGNOSIS — I50.22 SYSTOLIC HEART FAILURE, CHRONIC (MULTI): Primary | ICD-10-CM

## 2023-12-11 ENCOUNTER — OFFICE VISIT (OUTPATIENT)
Dept: PRIMARY CARE | Facility: CLINIC | Age: 58
End: 2023-12-11
Payer: COMMERCIAL

## 2023-12-11 VITALS
SYSTOLIC BLOOD PRESSURE: 130 MMHG | WEIGHT: 227.2 LBS | BODY MASS INDEX: 37.81 KG/M2 | DIASTOLIC BLOOD PRESSURE: 80 MMHG | HEART RATE: 75 BPM

## 2023-12-11 DIAGNOSIS — E11.9 TYPE 2 DIABETES MELLITUS WITHOUT COMPLICATION, WITHOUT LONG-TERM CURRENT USE OF INSULIN (MULTI): ICD-10-CM

## 2023-12-11 DIAGNOSIS — K52.9 ACUTE COLITIS: Primary | ICD-10-CM

## 2023-12-11 DIAGNOSIS — Z09 HOSPITAL DISCHARGE FOLLOW-UP: ICD-10-CM

## 2023-12-11 PROCEDURE — 4010F ACE/ARB THERAPY RXD/TAKEN: CPT | Performed by: CLINICAL NURSE SPECIALIST

## 2023-12-11 PROCEDURE — 1036F TOBACCO NON-USER: CPT | Performed by: CLINICAL NURSE SPECIALIST

## 2023-12-11 PROCEDURE — 99213 OFFICE O/P EST LOW 20 MIN: CPT | Performed by: CLINICAL NURSE SPECIALIST

## 2023-12-11 PROCEDURE — 3044F HG A1C LEVEL LT 7.0%: CPT | Performed by: CLINICAL NURSE SPECIALIST

## 2023-12-11 PROCEDURE — 3075F SYST BP GE 130 - 139MM HG: CPT | Performed by: CLINICAL NURSE SPECIALIST

## 2023-12-11 PROCEDURE — 3079F DIAST BP 80-89 MM HG: CPT | Performed by: CLINICAL NURSE SPECIALIST

## 2023-12-11 PROCEDURE — 3048F LDL-C <100 MG/DL: CPT | Performed by: CLINICAL NURSE SPECIALIST

## 2023-12-11 ASSESSMENT — ENCOUNTER SYMPTOMS
EYE PAIN: 0
FEVER: 0
UNEXPECTED WEIGHT CHANGE: 0
SHORTNESS OF BREATH: 0
WHEEZING: 0
APPETITE CHANGE: 0
BLOOD IN STOOL: 0
ARTHRALGIAS: 0
TROUBLE SWALLOWING: 0
HEMATURIA: 0
SLEEP DISTURBANCE: 0
ABDOMINAL PAIN: 0
CHEST TIGHTNESS: 0
ACTIVITY CHANGE: 0
DIARRHEA: 0
PHOTOPHOBIA: 0
COUGH: 0
CONSTIPATION: 0
NAUSEA: 0
FLANK PAIN: 0
SEIZURES: 0
CONFUSION: 0
NECK PAIN: 0
WOUND: 0
BRUISES/BLEEDS EASILY: 0
SORE THROAT: 0
JOINT SWELLING: 0
FATIGUE: 0
POLYDIPSIA: 0
DIZZINESS: 0
DYSURIA: 0
VOMITING: 0
CHILLS: 0
MYALGIAS: 0
HEADACHES: 0
BACK PAIN: 0
PALPITATIONS: 0

## 2023-12-11 NOTE — PROGRESS NOTES
"Subjective   Patient ID: Nguyen Ponce is a 58 y.o. female who presents for Hospital Follow-up.    HPI     Here today as a follow up appointment. Due for a TCM appointment. Patient was admitted to [facility] on 11/25/2023. Discharged on 11/28/2023. Presented to the ER with Abdominal Cramps, BRBPR. Diagnosed with Ischemic Colitis on Colonoscopy.  Abdominal pain is improving, yesterday was the first \"normal\" bowel movement since discharge. Mesenteric Artery Duplex complete.      Following with Dr. Mari for Cardiology, diagnosed with Heart failure in 2021. Hypertension. Long standing history of Hypertension, uncontrolled. Medications now adjusted by Cardiology. Cardiac MRI and ECHO in December. Monitoring her blood pressure at home, states that it will fluctuate. Follow up scheduled for this month.      Complaints of Arthritis. Previously followed with Rheumatology, not interested in following back with them. Using Two Tylenol ES daily. Some improvement after using medication. Did not tolerate Celebrex. Right hand dominant. Worse with getting up in the AM and picking anything up in her hand. \"Tender.\" Feels that it extends down into her hand. DId not have a good response with Rheumatologist in the past.      Neck pain has improved. Able to improve her ROM. Pain down into the Buttocks, radiating down.      COPD. Following with Milena DUFFY for Pulmonology. Recent Inhaler adjustment.      Patient states that she has chronic issues with Fatigue, cardiac medications adjusted by Cardiology.      Last lab work indicated Diabetes. Has been making some dietary changes to help with her numbers. Does not monitor her blood sugars at home. Started on Metformin, no longer tolerating medication. GI, increased gas. No improvement with XR. Has not tried any other medications besides the Metformin in the past. Trulicity added and is tolerating Trulicity better, would like to continue current medication.     Review of Systems "   Constitutional:  Negative for activity change, appetite change, chills, fatigue, fever and unexpected weight change.   HENT:  Negative for ear pain, hearing loss, nosebleeds, sore throat, tinnitus and trouble swallowing.    Eyes:  Negative for photophobia, pain and visual disturbance.   Respiratory:  Negative for cough, chest tightness, shortness of breath and wheezing.    Cardiovascular:  Negative for chest pain, palpitations and leg swelling.   Gastrointestinal:  Negative for abdominal pain, blood in stool, constipation, diarrhea, nausea and vomiting.   Endocrine: Negative for cold intolerance, heat intolerance, polydipsia and polyuria.   Genitourinary:  Negative for dysuria, flank pain and hematuria.   Musculoskeletal:  Negative for arthralgias, back pain, joint swelling, myalgias and neck pain.   Skin:  Negative for pallor, rash and wound.   Allergic/Immunologic: Negative for immunocompromised state.   Neurological:  Negative for dizziness, seizures and headaches.   Hematological:  Does not bruise/bleed easily.   Psychiatric/Behavioral:  Negative for confusion and sleep disturbance.        Objective   /80 (BP Location: Right arm, Patient Position: Sitting)   Pulse 75   Wt 103 kg (227 lb 3.2 oz)   BMI 37.81 kg/m²     Physical Exam  Constitutional:       General: She is not in acute distress.     Appearance: Normal appearance.   HENT:      Head: Normocephalic.      Nose: Nose normal.   Eyes:      Conjunctiva/sclera: Conjunctivae normal.   Neck:      Vascular: No carotid bruit.   Cardiovascular:      Rate and Rhythm: Normal rate and regular rhythm.      Pulses: Normal pulses.      Heart sounds: Normal heart sounds.   Pulmonary:      Effort: Pulmonary effort is normal.      Breath sounds: Normal breath sounds.   Abdominal:      General: Bowel sounds are normal.      Palpations: Abdomen is soft.   Musculoskeletal:         General: Normal range of motion.      Cervical back: Normal range of motion.    Skin:     General: Skin is warm and dry.   Neurological:      Mental Status: She is alert and oriented to person, place, and time. Mental status is at baseline.   Psychiatric:         Mood and Affect: Mood normal.         Behavior: Behavior normal.         Assessment/Plan         Reviewed recent Specialist appointments with patient.      Cardiomyopathy, Hypertension, Heart Failure, Palpitations: Following with Cardiology. Continue medications as prescribed by Cardiology.   COPD: Following with Pulmonology for management.   Obesity: BMI: 37.81. Lifestyle changes recommended: Diet consisting of low fat foods, lean meats, high fiber, fresh fruits and vegetables. 150 min/ weekly aerobic exercise.  Diabetes: Last A1C 6.5%. Lifestyle changes as noted above. Discontinued Metformin, due to tolerance. Monitor blood sugar readings. Call with blood sugar readings. Declined referral for Nutrition at this time. Continue Trulicity. Updated lab work ordered.   Joint Pain: Continue Tylenol.   Depression/Anxiety: Counseling information provided for patient at last OV.   Vitamin D Deficiency: Vitamin D.   TCM, Ischemic Colitis: Reviewed hospital records with patient. Has a follow up appointment with Specialist. Abdominal pain is improving.      COVID Vaccine: May/June 2021. Discussed Booster.   Mammogram: December 2021. Ordered for 2023.   Colonoscopy: 2011. Colonoscopy ordered.   Unable to update immunizations due to Insurance.   LDCT: October 2023, plan to repeat in 1 year.

## 2023-12-12 ENCOUNTER — APPOINTMENT (OUTPATIENT)
Dept: PRIMARY CARE | Facility: CLINIC | Age: 58
End: 2023-12-12
Payer: COMMERCIAL

## 2023-12-15 ENCOUNTER — TELEPHONE (OUTPATIENT)
Dept: PRIMARY CARE | Facility: CLINIC | Age: 58
End: 2023-12-15
Payer: COMMERCIAL

## 2023-12-15 DIAGNOSIS — E11.65 TYPE 2 DIABETES MELLITUS WITH HYPERGLYCEMIA, WITHOUT LONG-TERM CURRENT USE OF INSULIN (MULTI): ICD-10-CM

## 2023-12-15 RX ORDER — DULAGLUTIDE 0.75 MG/.5ML
0.75 INJECTION, SOLUTION SUBCUTANEOUS
Qty: 2 ML | Refills: 5 | Status: SHIPPED | OUTPATIENT
Start: 2023-12-15

## 2023-12-15 NOTE — TELEPHONE ENCOUNTER
Refill request:    She is out and due for her shot today. Somehow her script was canceled    [facility]    Pharmacy: [facility]

## 2023-12-15 NOTE — TELEPHONE ENCOUNTER
Rx Refill Request Telephone Encounter    Name:  Nguyen JEAN-BAPTISTE Kris  :  [id number]  Medication Name:  Trulicity  0.75 mg        Inject 0.75 mg under the skin ine time per week on   Specific Pharmacy location:  [facility]

## 2023-12-19 ENCOUNTER — PATIENT OUTREACH (OUTPATIENT)
Dept: PRIMARY CARE | Facility: CLINIC | Age: 58
End: 2023-12-19
Payer: COMMERCIAL

## 2023-12-19 NOTE — PROGRESS NOTES
Unable to reach patient for call back after patient's follow up appointment with PCP.   LVM with call back number for patient to call if needed   If no voicemail available call attempts x 2 were made to contact the patient to assist with any questions or concerns patient may have.    Rad Molina LPN

## 2023-12-20 RX ORDER — MONTELUKAST SODIUM 10 MG/1
10 TABLET ORAL NIGHTLY
COMMUNITY
Start: 2023-12-11

## 2023-12-22 ENCOUNTER — APPOINTMENT (OUTPATIENT)
Dept: CARDIOLOGY | Facility: CLINIC | Age: 58
End: 2023-12-22
Payer: COMMERCIAL

## 2023-12-29 ENCOUNTER — PATIENT OUTREACH (OUTPATIENT)
Dept: CARE COORDINATION | Facility: CLINIC | Age: 58
End: 2023-12-29
Payer: COMMERCIAL

## 2023-12-29 NOTE — PROGRESS NOTES
Call regarding appt. with PCP on (DATE) after hospitalization.  At time of outreach call the patient feels as if their condition has (improved/worsened/returned to baseline) since last visit.  Reviewed the PCP appointment with the pt and addressed any questions or concerns.  Rad Molina LPN

## 2024-01-03 DIAGNOSIS — I50.42 CHRONIC COMBINED SYSTOLIC AND DIASTOLIC CHF, NYHA CLASS 3 (MULTI): Primary | ICD-10-CM

## 2024-01-03 RX ORDER — CARVEDILOL 12.5 MG/1
12.5 TABLET ORAL
Qty: 180 TABLET | Refills: 1 | Status: SHIPPED | OUTPATIENT
Start: 2024-01-03 | End: 2024-04-09 | Stop reason: SDUPTHER

## 2024-01-03 NOTE — TELEPHONE ENCOUNTER
----- Message from Mamie Han sent at 1/3/2024 11:02 AM EST -----  Regarding: Reill  Patient called for refill of Carvedilol/Coreg 12.5 mg Twice daily.  Please send to [facility].

## 2024-01-03 NOTE — TELEPHONE ENCOUNTER
Last appointment: 5/8/23 with Dr. Mari  Next appointment: 1/5/24 with Sharon Pires    11/29/22 last refilled 90 days 3 refills

## 2024-01-04 ASSESSMENT — ENCOUNTER SYMPTOMS
DYSPNEA ON EXERTION: 0
SYNCOPE: 0
FEVER: 0
IRREGULAR HEARTBEAT: 0
ORTHOPNEA: 0
CHILLS: 0
HEMATOCHEZIA: 0
VOMITING: 0
WHEEZING: 0
SHORTNESS OF BREATH: 0
NAUSEA: 0
COUGH: 0
NEAR-SYNCOPE: 0
PALPITATIONS: 0
HEMATURIA: 0
ALTERED MENTAL STATUS: 0

## 2024-01-04 NOTE — PROGRESS NOTES
Chief Complaint/Reason for Visit:  Follow-up 3 month cardiovascular follow up    History Of Present Illness:    Nguyen Ponce is a 58 y.o. female that presents to the office for 3 month follow up.  Taking medications as prescribed.     PMH is significant for hypertension, anxiety, COPD - following with pulmonology, depression, nicotine dependence, obesity, PVD, HFrEF - EF improved, DDD and pneumonia.     She previously stated that her son was found dead at a homeless camp in [place] in 2021 and she has been struggling with stress eating. She was previously vegan but is now eating chicken.    She was admitted to [facility] 11/25/23-11/28/23 for acute colitis, BRBPR.  Plan was for outpatient colonoscopy to address polyps seen on  colonoscopy 11/27/23.    Past Medical History:  She has a past medical history of COPD (chronic obstructive pulmonary disease) (CMS/[facility]), Localized edema (09/14/2021), Lymphedema, not elsewhere classified (06/01/2021), Morbid (severe) obesity due to excess calories (CMS/[facility]) (01/12/2022), Obesity, Obesity, unspecified (01/12/2022), Obesity, unspecified (01/12/2022), Pain in left arm (05/17/2018), Pain in right arm, Personal history of other diseases of the circulatory system, Personal history of other diseases of the circulatory system (05/17/2018), Personal history of other endocrine, nutritional and metabolic disease, Personal history of other endocrine, nutritional and metabolic disease (01/12/2022), Personal history of other endocrine, nutritional and metabolic disease, Personal history of other infectious and parasitic diseases (01/12/2022), Personal history of peptic ulcer disease, Personal history of urinary calculi, and Prediabetes (01/12/2022).    Past Surgical History:  She has a past surgical history that includes Cholecystectomy (05/17/2018); Tonsillectomy (05/17/2018); Other surgical history (03/12/2020); and Cardiac catheterization (August 2021).       Social History:  She reports that she quit smoking about 2 years ago. Her smoking use included cigarettes. She has never used smokeless tobacco. She reports that she does not currently use alcohol. She reports that she does not use drugs.    Family History:  Family History   Problem Relation Name Age of Onset    Hypertension Mother Ree Marion     Other (palpitations) Mother Ree Marion     Other (palpitations) Sister      Diabetes Daughter      Brain Aneurysm Mother's Sister      Brain Aneurysm Paternal Grandmother      Brain Aneurysm Paternal Grandfather          Allergies:  Patient has no known allergies.    Review of Systems   Constitutional: Positive for weight loss (15 lbs). Negative for chills and fever.   Cardiovascular:  Positive for leg swelling (intermittent with high humidity). Negative for chest pain, dyspnea on exertion, irregular heartbeat, near-syncope, orthopnea, palpitations and syncope.   Respiratory:  Negative for cough, shortness of breath and wheezing.    Musculoskeletal:  Positive for back pain.   Gastrointestinal:  Positive for anorexia. Negative for hematochezia, melena, nausea and vomiting.   Genitourinary:  Negative for hematuria.   Psychiatric/Behavioral:  Negative for altered mental status.        Objective      Vitals reviewed.   Constitutional:       Appearance: Not in distress.   Neck:      Vascular: No carotid bruit.   Pulmonary:      Effort: Pulmonary effort is normal.      Breath sounds: Normal breath sounds.   Cardiovascular:      PMI at left midclavicular line. Normal rate. Regular rhythm. S1 with normal intensity. S2 with normal intensity.       Murmurs: There is no murmur.   Edema:     Peripheral edema absent.   Abdominal:      General: Bowel sounds are normal.   Neurological:      Mental Status: Alert and oriented to person, place and time.         Current Outpatient Medications   Medication Instructions    albuterol 90 mcg/actuation inhaler 2 puffs, inhalation, Every 4  hours PRN    aspirin 81 mg, oral, Daily    atorvastatin (LIPITOR) 40 mg, oral, Daily    budesonide-glycopyr-formoterol (Breztri Aerosphere) 160-9-4.8 mcg/actuation HFA aerosol inhaler 2 puffs, inhalation, 2 times daily RT    carvedilol (COREG) 12.5 mg, oral, 2 times daily with meals    fluticasone (Flonase) 50 mcg/actuation nasal spray 1 spray, nasal, 2 times daily    furosemide (LASIX) 40 mg, oral, Daily    ibuprofen (ADVIL) 600 mg, oral, Nightly    ipratropium-albuteroL (Duo-Neb) 0.5-2.5 mg/3 mL nebulizer solution 3 mL, inhalation, Every 4 hours PRN    loratadine (CLARITIN) 10 mg, oral, Daily    montelukast (SINGULAIR) 10 mg, oral, Nightly    multivitamin tablet 1 tablet, oral, Daily    olmesartan (BENIcar) 40 mg tablet 1 tablet, oral, Daily    OneTouch Delica Plus Lancet 33 gauge misc use to test once daily    potassium chloride ER (Micro-K) 10 mEq ER capsule 1 capsule, oral, Daily    Trulicity 0.75 mg, subcutaneous, Weekly, On Saturdays        Last Labs:  CBC -  Lab Results   Component Value Date    WBC 11.0 11/28/2023    HGB 12.3 11/28/2023    HCT 36.6 11/28/2023     (H) 11/28/2023     11/28/2023       CMP -  Lab Results   Component Value Date    CALCIUM 8.1 (L) 11/28/2023    PROT 7.8 11/25/2023    ALBUMIN 4.9 11/25/2023    AST 14 11/25/2023    ALT 14 11/25/2023    ALKPHOS 72 11/25/2023    BILITOT 0.6 11/25/2023       LIPID PANEL -   Lab Results   Component Value Date    CHOL 110 11/21/2023    TRIG 154 (H) 11/21/2023    HDL 38.5 11/21/2023    CHHDL 2.9 11/21/2023    LDLF 137 (H) 07/20/2023    VLDL 31 11/21/2023    NHDL 72 11/21/2023     Lab Results   Component Value Date    LDLCALC 41 11/21/2023       RENAL FUNCTION PANEL -   Lab Results   Component Value Date    GLUCOSE 122 (H) 11/28/2023     11/28/2023    K 3.5 11/28/2023     11/28/2023    CO2 30 11/28/2023    ANIONGAP 10 11/28/2023    BUN 13 11/28/2023    CREATININE 0.75 11/28/2023    CALCIUM 8.1 (L) 11/28/2023    ALBUMIN 4.9  "11/25/2023        Lab Results   Component Value Date    BNP 51 06/15/2023    HGBA1C 6.5 (H) 11/21/2023       Lab Results   Component Value Date    TSH 1.79 07/20/2023       No results found for this or any previous visit.     Last Cardiology Tests:    Mesenteric artery duplex 11/28/23:  Mesenteric: The celiac, hepatic, splenic and SMA appear widely patent with no evidence of stenosis. The RENA appears widely patent     Echo 1/12/23:   1. Left ventricular systolic function is normal with a 60-65% estimated ejection fraction.   2. There is moderate concentric left ventricular hypertrophy.   3. Moderately enlarged right ventricle.   4. Mild to moderate mitral valve regurgitation.   5. Mildly elevated RVSP.    Event monitoring performed from 01/14/2022 to 02/12/2022, which revealed a PAC burden of 1%, one episode of asymptomatic PSVT lasting 17 seconds with a heart rate of 120 bpm, and a PVC burden of 3%. No atrial fibrillation, pauses, heart block or VT.     Echocardiogram performed 12/13/2021 demonstrated an EF of 50-55%.      [facility] 8/26/2021 shows no evidence of significant CAD.    Visit Vitals  BP (!) 160/104 (BP Location: Left arm, Patient Position: Sitting, BP Cuff Size: Large adult)   Pulse 98   Ht 1.651 m (5' 5\")   Wt 101 kg (222 lb 9.6 oz)   SpO2 98%   BMI 37.04 kg/m²   OB Status Postmenopausal   Smoking Status Former   BSA 2.15 m²       Assessment/Plan   There were no encounter diagnoses.    1. Chronic systolic heart failure - EF improved  She is not volume overloaded on exam  Continue carvedilol 12.5 mg twice daily, furosemide 40 mg daily and olmesartan 40 mg daily  [facility] Aug 2021 with no significant CAD  TTE Jan 2023 with LVEF 60-65%     2. Palpitations  Event Monitor Jan/Feb 2022 with one episode of short SVT  Patient counselled about avoiding caffeine  Continue carvedilol 12.5 mg twice daily     3. HTN  Elevated  Continue current antihypertensives: carvedilol 12.5 mg BID, olmesartan 40 mg daily and furosemide " 40 mg daily  Restart spironolactone 12.5 mg daily (she thinks she stopped this after she ran out)  Stop KCl  Check BMP in 2 weeks    4. Sleep disturbance  Referred to Dr. Florez during last office visit with Dr. Saúl Mari - has not established     5. Acute colitis, BRBPR  Admission Nov 2023 for above.  ? Ischemic colitis  Mesenteric artery duplex 11/28/23 showed celiac, hepatic, splenic and SMA appear widely patent with no evidence of stenosis. RENA appeared widely patent.  Follow up with GI as recommended. Plan was for outpatient colonoscopy to address polyps seen on IP colonoscopy 11/27/23.    6. Lower back pain  Refer to orthopedic spine specialist     Sharon Pires, APRN-CNP

## 2024-01-04 NOTE — PATIENT INSTRUCTIONS
Recommend Mediterranean style of eating  Restart spironolactone 12.5 mg daily  Stop potassium chloride  Check lab work in 2 weeks  Referral ordered to orthopedic spine specialist  Follow up with GI - referral ordered  Follow-up with Dr. Mari or CLIVE Herrera in 3 months  If you have any questions or cardiac concerns, please call our office at [phone].

## 2024-01-05 ENCOUNTER — OFFICE VISIT (OUTPATIENT)
Dept: CARDIOLOGY | Facility: CLINIC | Age: 59
End: 2024-01-05
Payer: COMMERCIAL

## 2024-01-05 VITALS
SYSTOLIC BLOOD PRESSURE: 160 MMHG | BODY MASS INDEX: 37.09 KG/M2 | OXYGEN SATURATION: 98 % | HEIGHT: 65 IN | WEIGHT: 222.6 LBS | HEART RATE: 98 BPM | DIASTOLIC BLOOD PRESSURE: 104 MMHG

## 2024-01-05 DIAGNOSIS — M54.42 CHRONIC MIDLINE LOW BACK PAIN WITH BILATERAL SCIATICA: ICD-10-CM

## 2024-01-05 DIAGNOSIS — M54.41 CHRONIC MIDLINE LOW BACK PAIN WITH BILATERAL SCIATICA: ICD-10-CM

## 2024-01-05 DIAGNOSIS — R93.3 ABNORMAL COLONOSCOPY: Primary | ICD-10-CM

## 2024-01-05 DIAGNOSIS — I10 BENIGN ESSENTIAL HYPERTENSION: ICD-10-CM

## 2024-01-05 DIAGNOSIS — G89.29 CHRONIC MIDLINE LOW BACK PAIN WITH BILATERAL SCIATICA: ICD-10-CM

## 2024-01-05 PROCEDURE — 4010F ACE/ARB THERAPY RXD/TAKEN: CPT | Performed by: NURSE PRACTITIONER

## 2024-01-05 PROCEDURE — 3080F DIAST BP >= 90 MM HG: CPT | Performed by: NURSE PRACTITIONER

## 2024-01-05 PROCEDURE — 1036F TOBACCO NON-USER: CPT | Performed by: NURSE PRACTITIONER

## 2024-01-05 PROCEDURE — 3077F SYST BP >= 140 MM HG: CPT | Performed by: NURSE PRACTITIONER

## 2024-01-05 PROCEDURE — 99214 OFFICE O/P EST MOD 30 MIN: CPT | Performed by: NURSE PRACTITIONER

## 2024-01-05 RX ORDER — SPIRONOLACTONE 25 MG/1
12.5 TABLET ORAL DAILY
Qty: 45 TABLET | Refills: 1 | Status: SHIPPED | OUTPATIENT
Start: 2024-01-05 | End: 2024-04-06 | Stop reason: SDUPTHER

## 2024-01-05 ASSESSMENT — ENCOUNTER SYMPTOMS
ANOREXIA: 1
BACK PAIN: 1
WEIGHT LOSS: 1

## 2024-03-12 DIAGNOSIS — I50.42 CHRONIC COMBINED SYSTOLIC AND DIASTOLIC CHF, NYHA CLASS 3 (MULTI): ICD-10-CM

## 2024-03-20 RX ORDER — FUROSEMIDE 40 MG/1
40 TABLET ORAL DAILY
Qty: 90 TABLET | Refills: 0 | Status: SHIPPED | OUTPATIENT
Start: 2024-03-20

## 2024-03-26 ENCOUNTER — APPOINTMENT (OUTPATIENT)
Dept: GASTROENTEROLOGY | Facility: CLINIC | Age: 59
End: 2024-03-26
Payer: COMMERCIAL

## 2024-04-01 ASSESSMENT — ENCOUNTER SYMPTOMS
CHILLS: 0
HEMATOCHEZIA: 0
IRREGULAR HEARTBEAT: 0
WHEEZING: 0
ALTERED MENTAL STATUS: 0
DYSPNEA ON EXERTION: 0
PALPITATIONS: 0
SHORTNESS OF BREATH: 0
FEVER: 0
SYNCOPE: 0
VOMITING: 0
HEMATURIA: 0
NEAR-SYNCOPE: 0
NAUSEA: 0
COUGH: 0
BACK PAIN: 1
ORTHOPNEA: 0

## 2024-04-01 NOTE — PATIENT INSTRUCTIONS
Recommend Mediterranean style of eating  Increase spironolactone 25 mg daily  Check lab work  Follow-up with Dr. Mari in 3 months  If you have any questions or cardiac concerns, please call our office at [phone].

## 2024-04-01 NOTE — PROGRESS NOTES
Chief Complaint/Reason for Visit:  No chief complaint on file. 3 month cardiovascular follow up    History Of Present Illness:    Nguyen Ponce is a 58 y.o. female that presents to the office for 3 month follow up.  Taking medications as prescribed.     PMH is significant for hypertension, anxiety, COPD - following with pulmonology, depression, nicotine dependence, obesity, PVD, HFrEF - EF improved, DDD and pneumonia.     She previously stated that her son was found dead at a homeless camp in [place] in 2021. She was previously vegan but is now eating chicken.  She is intentionally  losing weight and so far has lost ~ 30 lbs since Nov.    She was admitted to [facility] 11/25/23-11/28/23 for acute colitis, BRBPR.  Plan was for outpatient colonoscopy to address polyps seen on  colonoscopy 11/27/23.    During last office visit, she was restarted on spironolactone 12.5 mg daily.  She has been monitoring her BP at home, but forgot her log at home.  Prior to her appt today she states the back window of her SUV shattered when she shut her door so she had to drive her husbands truck her.  She thinks overall her BP has still been too high at home though as well.      Past Medical History:  She has a past medical history of COPD (chronic obstructive pulmonary disease) (CMS/[facility]), Localized edema (09/14/2021), Lymphedema, not elsewhere classified (06/01/2021), Morbid (severe) obesity due to excess calories (CMS/HCC) (01/12/2022), Obesity, Obesity, unspecified (01/12/2022), Obesity, unspecified (01/12/2022), Pain in left arm (05/17/2018), Pain in right arm, Personal history of other diseases of the circulatory system, Personal history of other diseases of the circulatory system (05/17/2018), Personal history of other endocrine, nutritional and metabolic disease, Personal history of other endocrine, nutritional and metabolic disease (01/12/2022), Personal history of other endocrine, nutritional and metabolic  disease, Personal history of other infectious and parasitic diseases (01/12/2022), Personal history of peptic ulcer disease, Personal history of urinary calculi, and Prediabetes (01/12/2022).    Past Surgical History:  She has a past surgical history that includes Cholecystectomy (05/17/2018); Tonsillectomy (05/17/2018); Other surgical history (03/12/2020); and Cardiac catheterization (August 2021).      Social History:  She reports that she quit smoking about 2 years ago. Her smoking use included cigarettes. She has never used smokeless tobacco. She reports that she does not currently use alcohol. She reports that she does not use drugs.    Family History:  Family History   Problem Relation Name Age of Onset    Hypertension Mother Ree Marion     Other (palpitations) Mother Ree Marion     Other (palpitations) Sister      Diabetes Daughter      Brain Aneurysm Mother's Sister      Brain Aneurysm Paternal Grandmother      Brain Aneurysm Paternal Grandfather          Allergies:  Patient has no known allergies.    Review of Systems   Constitutional: Positive for weight loss (intentionally lost ~ 30 lbs). Negative for chills and fever.   Cardiovascular:  Positive for leg swelling (intermittent with high humidity). Negative for chest pain, dyspnea on exertion, irregular heartbeat, near-syncope, orthopnea, palpitations and syncope.   Respiratory:  Negative for cough, shortness of breath and wheezing.    Musculoskeletal:  Positive for back pain.   Gastrointestinal:  Negative for hematochezia, melena, nausea and vomiting.   Genitourinary:  Negative for hematuria.   Psychiatric/Behavioral:  Negative for altered mental status.      Objective      Vitals reviewed.   Constitutional:       Appearance: Not in distress.   Neck:      Vascular: No carotid bruit.   Pulmonary:      Effort: Pulmonary effort is normal.      Breath sounds: Normal breath sounds.   Cardiovascular:      PMI at left midclavicular line. Normal rate.  Regular rhythm. S1 with normal intensity. S2 with normal intensity.       Murmurs: There is no murmur.   Edema:     Peripheral edema absent.   Abdominal:      General: Bowel sounds are normal.   Neurological:      Mental Status: Alert and oriented to person, place and time.         Current Outpatient Medications   Medication Instructions    albuterol 90 mcg/actuation inhaler 2 puffs, inhalation, Every 4 hours PRN    aspirin 81 mg, oral, Daily    atorvastatin (LIPITOR) 40 mg, oral, Daily    budesonide-glycopyr-formoterol (Breztri Aerosphere) 160-9-4.8 mcg/actuation HFA aerosol inhaler 2 puffs, inhalation, 2 times daily RT    carvedilol (COREG) 12.5 mg, oral, 2 times daily with meals    fluticasone (Flonase) 50 mcg/actuation nasal spray 1 spray, nasal, 2 times daily    fluticasone-umeclidin-vilanter (TRELEGY-ELLIPTA) 100-62.5-25 mcg blister with device inhalation    furosemide (LASIX) 40 mg, oral, Daily    ipratropium-albuteroL (Duo-Neb) 0.5-2.5 mg/3 mL nebulizer solution 3 mL, inhalation, Every 4 hours PRN    loratadine (CLARITIN) 10 mg, oral, Daily    montelukast (SINGULAIR) 10 mg, oral, Nightly    multivitamin tablet 1 tablet, oral, Daily    olmesartan (BENIcar) 40 mg tablet 1 tablet, oral, Daily    oxyCODONE (Roxicodone) 10 mg immediate release tablet oral, Every 4 hours PRN    spironolactone (ALDACTONE) 12.5 mg, oral, Daily    Trulicity 0.75 mg, subcutaneous, Once Weekly, On Saturdays        Last Labs:  CBC -  Lab Results   Component Value Date    WBC 11.0 11/28/2023    HGB 12.3 11/28/2023    HCT 36.6 11/28/2023     (H) 11/28/2023     11/28/2023       CMP -  Lab Results   Component Value Date    CALCIUM 8.1 (L) 11/28/2023    PROT 7.8 11/25/2023    ALBUMIN 4.9 11/25/2023    AST 14 11/25/2023    ALT 14 11/25/2023    ALKPHOS 72 11/25/2023    BILITOT 0.6 11/25/2023       LIPID PANEL -   Lab Results   Component Value Date    CHOL 110 11/21/2023    TRIG 154 (H) 11/21/2023    HDL 38.5 11/21/2023    CHHDL  2.9 11/21/2023    LDLF 137 (H) 07/20/2023    VLDL 31 11/21/2023    NHDL 72 11/21/2023     Lab Results   Component Value Date    LDLCALC 41 11/21/2023       RENAL FUNCTION PANEL -   Lab Results   Component Value Date    GLUCOSE 122 (H) 11/28/2023     11/28/2023    K 3.5 11/28/2023     11/28/2023    CO2 30 11/28/2023    ANIONGAP 10 11/28/2023    BUN 13 11/28/2023    CREATININE 0.75 11/28/2023    CALCIUM 8.1 (L) 11/28/2023    ALBUMIN 4.9 11/25/2023        Lab Results   Component Value Date    BNP 51 06/15/2023    HGBA1C 6.5 (H) 11/21/2023       Lab Results   Component Value Date    TSH 1.79 07/20/2023       No results found for this or any previous visit.     Last Cardiology Tests:    Mesenteric artery duplex 11/28/23:  Mesenteric: The celiac, hepatic, splenic and SMA appear widely patent with no evidence of stenosis. The RENA appears widely patent     Echo 1/12/23:   1. Left ventricular systolic function is normal with a 60-65% estimated ejection fraction.   2. There is moderate concentric left ventricular hypertrophy.   3. Moderately enlarged right ventricle.   4. Mild to moderate mitral valve regurgitation.   5. Mildly elevated RVSP.    Event monitoring performed from 01/14/2022 to 02/12/2022, which revealed a PAC burden of 1%, one episode of asymptomatic PSVT lasting 17 seconds with a heart rate of 120 bpm, and a PVC burden of 3%. No atrial fibrillation, pauses, heart block or VT.     Echocardiogram performed 12/13/2021 demonstrated an EF of 50-55%.      [facility] 8/26/2021 shows no evidence of significant CAD.    Visit Vitals  BP (!) 162/94   Pulse 83   Wt 97.1 kg (214 lb)   SpO2 96%   BMI 35.61 kg/m²   OB Status Postmenopausal   Smoking Status Former   BSA 2.11 m²       Assessment/Plan   The primary encounter diagnosis was Systolic heart failure, chronic (CMS/HCC). Diagnoses of Palpitations, Benign essential hypertension, Abnormal colonoscopy, and Chronic midline low back pain with bilateral sciatica were  also pertinent to this visit.    1. Chronic systolic heart failure - EF improved  She is not volume overloaded on exam  Continue carvedilol 12.5 mg twice daily, furosemide 40 mg daily and olmesartan 40 mg daily  [facility] Aug 2021 with no significant CAD  TTE Jan 2023 with LVEF 60-65%     2. Palpitations  Event Monitor Jan/Feb 2022 with one episode of short SVT  Patient counselled about avoiding caffeine  Continue carvedilol 12.5 mg twice daily     3. HTN  Elevated  Continue current antihypertensives: carvedilol 12.5 mg BID, olmesartan 40 mg daily and furosemide 40 mg daily   Increase spironolactone 25 mg daily   Continue home BP monitoring  Check BMP     4. Sleep disturbance  Referred to Dr. Florez during last office visit with Dr. Saúl Mari - has not established     5. Acute colitis, BRBPR  Admission Nov 2023 for above.  ? Ischemic colitis  Mesenteric artery duplex 11/28/23 showed celiac, hepatic, splenic and SMA appear widely patent with no evidence of stenosis. RENA appeared widely patent.  Follow up with GI as recommended. Plan was for outpatient colonoscopy to address polyps seen on IP colonoscopy 11/27/23.    6. Lower back pain  Refer to orthopedic spine specialist ordered during last office visit.  She states they never called her to schedule.  I will have out out take staff look into this.     Sharon Pires, APRN-CNP

## 2024-04-02 PROBLEM — R04.2 HEMOPTYSIS: Status: ACTIVE | Noted: 2024-04-02

## 2024-04-02 PROBLEM — R69 DISEASE SUSPECTED: Status: ACTIVE | Noted: 2023-05-23

## 2024-04-02 PROBLEM — Z86.39 HISTORY OF ENDOCRINE DISORDER: Status: ACTIVE | Noted: 2024-04-02

## 2024-04-02 PROBLEM — M25.569 KNEE PAIN: Status: ACTIVE | Noted: 2023-05-23

## 2024-04-02 PROBLEM — K08.89 TOOTHACHE: Status: ACTIVE | Noted: 2024-04-02

## 2024-04-02 PROBLEM — Z86.79 HISTORY OF HEART FAILURE: Status: ACTIVE | Noted: 2024-04-02

## 2024-04-02 PROBLEM — J44.9 CHRONIC OBSTRUCTIVE PULMONARY DISEASE (MULTI): Status: ACTIVE | Noted: 2023-04-28

## 2024-04-02 PROBLEM — I99.8 VASCULAR INSUFFICIENCY: Status: ACTIVE | Noted: 2023-05-23

## 2024-04-02 PROBLEM — R35.0 INCREASED FREQUENCY OF URINATION: Status: ACTIVE | Noted: 2022-08-16

## 2024-04-02 PROBLEM — D35.00 ADRENAL ADENOMA: Status: ACTIVE | Noted: 2024-04-02

## 2024-04-02 PROBLEM — Z86.79 HISTORY OF PAROXYSMAL SUPRAVENTRICULAR TACHYCARDIA: Status: ACTIVE | Noted: 2024-04-02

## 2024-04-02 PROBLEM — E87.6 HYPOKALEMIA: Status: ACTIVE | Noted: 2024-04-02

## 2024-04-02 PROBLEM — B35.9 DERMATOPHYTOSIS: Status: ACTIVE | Noted: 2024-04-02

## 2024-04-02 PROBLEM — R06.02 SHORTNESS OF BREATH: Status: ACTIVE | Noted: 2021-08-24

## 2024-04-02 PROBLEM — J18.9 PNEUMONIA DUE TO INFECTIOUS ORGANISM: Status: ACTIVE | Noted: 2024-04-02

## 2024-04-02 PROBLEM — J06.9 UPPER RESPIRATORY TRACT INFECTION: Status: ACTIVE | Noted: 2024-04-02

## 2024-04-02 RX ORDER — ATORVASTATIN CALCIUM 20 MG/1
20 TABLET, FILM COATED ORAL NIGHTLY
COMMUNITY
Start: 2024-02-08 | End: 2024-04-09 | Stop reason: WASHOUT

## 2024-04-02 RX ORDER — OXYCODONE HYDROCHLORIDE 10 MG/1
TABLET ORAL EVERY 4 HOURS PRN
COMMUNITY
Start: 2020-02-18 | End: 2024-04-09 | Stop reason: WASHOUT

## 2024-04-02 RX ORDER — BUDESONIDE AND FORMOTEROL FUMARATE DIHYDRATE 80; 4.5 UG/1; UG/1
AEROSOL RESPIRATORY (INHALATION) EVERY 12 HOURS
COMMUNITY
Start: 2021-08-18 | End: 2024-04-09 | Stop reason: WASHOUT

## 2024-04-02 RX ORDER — POLYETHYLENE GLYCOL 3350 17 G/17G
17 POWDER, FOR SOLUTION ORAL 2 TIMES DAILY PRN
COMMUNITY
Start: 2019-11-11 | End: 2024-04-09 | Stop reason: WASHOUT

## 2024-04-02 RX ORDER — POTASSIUM CHLORIDE 750 MG/1
10 CAPSULE, EXTENDED RELEASE ORAL DAILY
COMMUNITY
Start: 2024-02-08 | End: 2024-04-09 | Stop reason: WASHOUT

## 2024-04-02 RX ORDER — DOCUSATE SODIUM 100 MG/1
CAPSULE, LIQUID FILLED ORAL EVERY 12 HOURS
COMMUNITY
Start: 2019-11-11 | End: 2024-04-09 | Stop reason: WASHOUT

## 2024-04-02 RX ORDER — ACETAMINOPHEN 500 MG
TABLET ORAL
COMMUNITY
End: 2024-04-09 | Stop reason: WASHOUT

## 2024-04-05 ENCOUNTER — APPOINTMENT (OUTPATIENT)
Dept: CARDIOLOGY | Facility: CLINIC | Age: 59
End: 2024-04-05
Payer: COMMERCIAL

## 2024-04-09 ENCOUNTER — OFFICE VISIT (OUTPATIENT)
Dept: CARDIOLOGY | Facility: CLINIC | Age: 59
End: 2024-04-09
Payer: COMMERCIAL

## 2024-04-09 VITALS
HEART RATE: 83 BPM | DIASTOLIC BLOOD PRESSURE: 94 MMHG | OXYGEN SATURATION: 96 % | BODY MASS INDEX: 35.61 KG/M2 | SYSTOLIC BLOOD PRESSURE: 162 MMHG | WEIGHT: 214 LBS

## 2024-04-09 DIAGNOSIS — M54.42 CHRONIC MIDLINE LOW BACK PAIN WITH BILATERAL SCIATICA: ICD-10-CM

## 2024-04-09 DIAGNOSIS — M54.41 CHRONIC MIDLINE LOW BACK PAIN WITH BILATERAL SCIATICA: ICD-10-CM

## 2024-04-09 DIAGNOSIS — I50.42 CHRONIC COMBINED SYSTOLIC AND DIASTOLIC CHF, NYHA CLASS 3 (MULTI): ICD-10-CM

## 2024-04-09 DIAGNOSIS — R00.2 PALPITATIONS: ICD-10-CM

## 2024-04-09 DIAGNOSIS — I50.22 SYSTOLIC HEART FAILURE, CHRONIC (MULTI): Primary | ICD-10-CM

## 2024-04-09 DIAGNOSIS — G89.29 CHRONIC MIDLINE LOW BACK PAIN WITH BILATERAL SCIATICA: ICD-10-CM

## 2024-04-09 DIAGNOSIS — I10 BENIGN ESSENTIAL HYPERTENSION: ICD-10-CM

## 2024-04-09 DIAGNOSIS — R93.3 ABNORMAL COLONOSCOPY: ICD-10-CM

## 2024-04-09 PROCEDURE — 4010F ACE/ARB THERAPY RXD/TAKEN: CPT | Performed by: NURSE PRACTITIONER

## 2024-04-09 PROCEDURE — 99214 OFFICE O/P EST MOD 30 MIN: CPT | Performed by: NURSE PRACTITIONER

## 2024-04-09 PROCEDURE — 3077F SYST BP >= 140 MM HG: CPT | Performed by: NURSE PRACTITIONER

## 2024-04-09 PROCEDURE — 3080F DIAST BP >= 90 MM HG: CPT | Performed by: NURSE PRACTITIONER

## 2024-04-09 PROCEDURE — 1036F TOBACCO NON-USER: CPT | Performed by: NURSE PRACTITIONER

## 2024-04-09 RX ORDER — OLMESARTAN MEDOXOMIL 40 MG/1
40 TABLET ORAL DAILY
Qty: 90 TABLET | Refills: 1 | Status: SHIPPED | OUTPATIENT
Start: 2024-04-09 | End: 2024-10-06

## 2024-04-09 RX ORDER — SPIRONOLACTONE 25 MG/1
25 TABLET ORAL DAILY
Qty: 45 TABLET | Refills: 0 | Status: SHIPPED | OUTPATIENT
Start: 2024-04-09 | End: 2024-06-04 | Stop reason: SDUPTHER

## 2024-04-09 RX ORDER — CARVEDILOL 12.5 MG/1
12.5 TABLET ORAL
Qty: 180 TABLET | Refills: 3 | Status: SHIPPED | OUTPATIENT
Start: 2024-04-09 | End: 2025-04-09

## 2024-04-09 ASSESSMENT — ENCOUNTER SYMPTOMS: WEIGHT LOSS: 1

## 2024-04-16 ENCOUNTER — LAB (OUTPATIENT)
Dept: LAB | Facility: LAB | Age: 59
End: 2024-04-16
Payer: COMMERCIAL

## 2024-04-16 ENCOUNTER — OFFICE VISIT (OUTPATIENT)
Dept: PULMONOLOGY | Facility: HOSPITAL | Age: 59
End: 2024-04-16
Payer: COMMERCIAL

## 2024-04-16 VITALS
TEMPERATURE: 97.6 F | BODY MASS INDEX: 35.6 KG/M2 | WEIGHT: 213.7 LBS | HEIGHT: 65 IN | RESPIRATION RATE: 16 BRPM | DIASTOLIC BLOOD PRESSURE: 79 MMHG | SYSTOLIC BLOOD PRESSURE: 115 MMHG | HEART RATE: 75 BPM | OXYGEN SATURATION: 94 %

## 2024-04-16 DIAGNOSIS — J44.9 CHRONIC OBSTRUCTIVE PULMONARY DISEASE, UNSPECIFIED COPD TYPE (MULTI): Primary | ICD-10-CM

## 2024-04-16 DIAGNOSIS — I10 BENIGN ESSENTIAL HYPERTENSION: ICD-10-CM

## 2024-04-16 DIAGNOSIS — Z87.891 FORMER SMOKER: ICD-10-CM

## 2024-04-16 DIAGNOSIS — J30.9 ALLERGIC RHINITIS, UNSPECIFIED SEASONALITY, UNSPECIFIED TRIGGER: ICD-10-CM

## 2024-04-16 LAB
ANION GAP SERPL CALC-SCNC: 14 MMOL/L (ref 10–20)
BUN SERPL-MCNC: 21 MG/DL (ref 6–23)
CALCIUM SERPL-MCNC: 9.5 MG/DL (ref 8.6–10.3)
CHLORIDE SERPL-SCNC: 102 MMOL/L (ref 98–107)
CO2 SERPL-SCNC: 28 MMOL/L (ref 21–32)
CREAT SERPL-MCNC: 0.89 MG/DL (ref 0.5–1.05)
EGFRCR SERPLBLD CKD-EPI 2021: 76 ML/MIN/1.73M*2
GLUCOSE SERPL-MCNC: 104 MG/DL (ref 74–99)
POTASSIUM SERPL-SCNC: 4.6 MMOL/L (ref 3.5–5.3)
SODIUM SERPL-SCNC: 139 MMOL/L (ref 136–145)

## 2024-04-16 PROCEDURE — 3078F DIAST BP <80 MM HG: CPT | Performed by: NURSE PRACTITIONER

## 2024-04-16 PROCEDURE — 99213 OFFICE O/P EST LOW 20 MIN: CPT | Mod: ZK | Performed by: NURSE PRACTITIONER

## 2024-04-16 PROCEDURE — 99213 OFFICE O/P EST LOW 20 MIN: CPT | Performed by: NURSE PRACTITIONER

## 2024-04-16 PROCEDURE — 1036F TOBACCO NON-USER: CPT | Performed by: NURSE PRACTITIONER

## 2024-04-16 PROCEDURE — 4010F ACE/ARB THERAPY RXD/TAKEN: CPT | Performed by: NURSE PRACTITIONER

## 2024-04-16 PROCEDURE — 80048 BASIC METABOLIC PNL TOTAL CA: CPT

## 2024-04-16 PROCEDURE — 36415 COLL VENOUS BLD VENIPUNCTURE: CPT

## 2024-04-16 PROCEDURE — 3074F SYST BP LT 130 MM HG: CPT | Performed by: NURSE PRACTITIONER

## 2024-04-16 RX ORDER — CETIRIZINE HYDROCHLORIDE 10 MG/1
10 TABLET ORAL DAILY
Qty: 30 TABLET | Refills: 11 | Status: SHIPPED | OUTPATIENT
Start: 2024-04-16

## 2024-04-16 RX ORDER — FLUTICASONE PROPIONATE 50 MCG
1 SPRAY, SUSPENSION (ML) NASAL 2 TIMES DAILY
Qty: 16 G | Refills: 11 | Status: SHIPPED | OUTPATIENT
Start: 2024-04-16

## 2024-04-16 RX ORDER — VIT C/E/ZN/COPPR/LUTEIN/ZEAXAN 250MG-90MG
50 CAPSULE ORAL DAILY
COMMUNITY

## 2024-04-16 RX ORDER — AZELASTINE 1 MG/ML
1 SPRAY, METERED NASAL 2 TIMES DAILY
Qty: 1 ML | Refills: 11 | Status: SHIPPED | OUTPATIENT
Start: 2024-04-16

## 2024-04-16 ASSESSMENT — COLUMBIA-SUICIDE SEVERITY RATING SCALE - C-SSRS
1. IN THE PAST MONTH, HAVE YOU WISHED YOU WERE DEAD OR WISHED YOU COULD GO TO SLEEP AND NOT WAKE UP?: NO
6. HAVE YOU EVER DONE ANYTHING, STARTED TO DO ANYTHING, OR PREPARED TO DO ANYTHING TO END YOUR LIFE?: NO
2. HAVE YOU ACTUALLY HAD ANY THOUGHTS OF KILLING YOURSELF?: NO

## 2024-04-16 ASSESSMENT — ENCOUNTER SYMPTOMS
UNEXPECTED WEIGHT CHANGE: 0
OCCASIONAL FEELINGS OF UNSTEADINESS: 0
FATIGUE: 0
SHORTNESS OF BREATH: 1
COUGH: 0
CHILLS: 0
FEVER: 0
WHEEZING: 0
DEPRESSION: 0
LOSS OF SENSATION IN FEET: 0
RHINORRHEA: 0

## 2024-04-16 ASSESSMENT — PATIENT HEALTH QUESTIONNAIRE - PHQ9
2. FEELING DOWN, DEPRESSED OR HOPELESS: NOT AT ALL
SUM OF ALL RESPONSES TO PHQ9 QUESTIONS 1 AND 2: 0
1. LITTLE INTEREST OR PLEASURE IN DOING THINGS: NOT AT ALL

## 2024-04-16 NOTE — PATIENT INSTRUCTIONS
Continue Breztri twice a day, everyday.  Continue on albuterol prn.   Stop Loratadine and start on Cetirizine (Zyrtec) once a day, continue Montelukast at bedtime.   Continue on Flonase, start Azelastine nasal spray twice a day also.   Please get CT scan of your chest in October.   Call with any questions or concerns.   Follow up with me in November.

## 2024-04-16 NOTE — PROGRESS NOTES
Subjective   Patient ID: Nguyen Ponce is a 58 y.o. female who presents for COPD follow up.    HPI: Patient has PMH of COPD, allergic rhinitis, and chronic systolic and diastolic heart failure. She is a former smoker, quit 2022, but smoked at 1 ppd X 40 years. She is currently on Trelegy and uses albuterol prn. She has not had CT done yet but has this scheduled for 10/18. She would like to change to a mist inhaler. She has no other concerns.     Today she is here for follow up. She states that her breathing has been stable on Breztri, she likes the mist inhaler best. She is using albuterol about twice per day due to the weather lately. Allergies have been worse lately also. She has no other concerns.     Review of Systems   Constitutional:  Negative for chills, fatigue, fever and unexpected weight change.   HENT:  Negative for congestion, postnasal drip and rhinorrhea.    Respiratory:  Positive for shortness of breath. Negative for cough (denies hemoptysis.) and wheezing.    Cardiovascular:  Negative for chest pain and leg swelling.   All other systems reviewed and are negative.      Objective   Physical Exam  Vitals reviewed.   Constitutional:       Appearance: Normal appearance.   HENT:      Head: Normocephalic.   Cardiovascular:      Rate and Rhythm: Normal rate and regular rhythm.   Pulmonary:      Effort: Pulmonary effort is normal.      Breath sounds: Decreased breath sounds present.   Skin:     General: Skin is warm and dry.   Neurological:      Mental Status: She is alert.         Assessment/Plan     COPD  2.   Nicotine dependence, in remission  3.   Allergic rhinitis  4.   Chronic systolic and diastolic heart failure  5.   Obesity      Plan:     -PFTs with FEV1/FVC 61, FEV1 54. AAT normal.  -Continue Breztri.    -Continue albuterol prn.   -IGE, RAST negative.   -Continue Flonase, Montelukast. Will change loratadine to Zyrtec, she feels Loratadine is not working as well anymore. Will also add Azelastine.    -She is a former smoker, quit 2022 but smoked for 40 years at 1 ppd.   -LDCT was done 10/18/23 and showed mild emphysema, no suspicious nodules, will repeat October 2024, order placed today. A shared decision making was done regarding the importance of Low Dose CT chest in screening for lung nodules. Discussed patient's risk factors for malignancy and qualifications for screening test. Discussed the follow up steps if nodules are found based on RAD guidelines. Patient understands and would proceed with Lung Cancer Screening CT chest protocol.    -She has lost over 25 lbs since being treated for heart failure.   -She was tested for CEDRIC in 2015 and was negative, continue weight loss strategies.       Overall we will continue current regimen and get LDCT in October. I will bring her back with me in November. I instructed patient to call sooner if needed.

## 2024-05-16 ENCOUNTER — OFFICE VISIT (OUTPATIENT)
Dept: GASTROENTEROLOGY | Facility: CLINIC | Age: 59
End: 2024-05-16
Payer: COMMERCIAL

## 2024-05-16 VITALS
HEART RATE: 71 BPM | HEIGHT: 65 IN | DIASTOLIC BLOOD PRESSURE: 85 MMHG | OXYGEN SATURATION: 93 % | SYSTOLIC BLOOD PRESSURE: 123 MMHG | WEIGHT: 209 LBS | BODY MASS INDEX: 34.82 KG/M2

## 2024-05-16 DIAGNOSIS — R93.3 ABNORMAL COLONOSCOPY: ICD-10-CM

## 2024-05-16 DIAGNOSIS — K59.04 CHRONIC IDIOPATHIC CONSTIPATION: ICD-10-CM

## 2024-05-16 DIAGNOSIS — K59.09 CHRONIC CONSTIPATION: Primary | ICD-10-CM

## 2024-05-16 DIAGNOSIS — K52.9 ACUTE COLITIS: ICD-10-CM

## 2024-05-16 DIAGNOSIS — K63.5 POLYP OF SIGMOID COLON, UNSPECIFIED TYPE: ICD-10-CM

## 2024-05-16 PROCEDURE — 99214 OFFICE O/P EST MOD 30 MIN: CPT | Performed by: PHYSICIAN ASSISTANT

## 2024-05-16 PROCEDURE — 4010F ACE/ARB THERAPY RXD/TAKEN: CPT | Performed by: PHYSICIAN ASSISTANT

## 2024-05-16 PROCEDURE — 3079F DIAST BP 80-89 MM HG: CPT | Performed by: PHYSICIAN ASSISTANT

## 2024-05-16 PROCEDURE — 3074F SYST BP LT 130 MM HG: CPT | Performed by: PHYSICIAN ASSISTANT

## 2024-05-16 PROCEDURE — 1036F TOBACCO NON-USER: CPT | Performed by: PHYSICIAN ASSISTANT

## 2024-05-16 RX ORDER — POLYETHYLENE GLYCOL 3350 17 G/17G
17 POWDER, FOR SOLUTION ORAL DAILY
Qty: 30 PACKET | Refills: 11 | Status: SHIPPED | OUTPATIENT
Start: 2024-05-16 | End: 2025-05-16

## 2024-05-16 NOTE — ASSESSMENT & PLAN NOTE
Patient with years of constipation. Constipation has worsened some since starting Trulicity. TSH recently normal. I recommended she try daily miralax. Prescription sent.

## 2024-05-16 NOTE — PATIENT INSTRUCTIONS
Thank you for coming in for your appointment.     -Start daily miralax  -I will discuss with Dr. Lezama.

## 2024-05-16 NOTE — ASSESSMENT & PLAN NOTE
Patient with acute colitis in November 2023. Biopsies of left side were consistent with early ischemia. Mesenteric ultrasound showed patent vasculature. Abdominal pain and rectal bleeding has resolved.

## 2024-05-16 NOTE — H&P (VIEW-ONLY)
Subjective   Patient ID: Nguyen Ponce is a 58 y.o. female who was presents for New Patient Visit (Colonoscopy results from November - done by Dr. Lezama during hospital stay. ).    Patient's PCP is RAYMON Hauser    PMH: cardiomyopathy, history of heart failure, PVD, HTN, HLD, DM2, adrenal adenoma, anxiety, COPD    This is established visit due to patient being admitted to the hospital.  HPI  Patient was admitted to the hospital from 11/25-11/28/2023 for acute colitis with symptoms of abdominal pain and rectal bleeding. She underwent colonoscopy with Dr. Lezama on 11/27/2023. There were inflammatory changes from splenic flexure to descending colon. Biopsies were consistent with early ischemia. There were multiple medium to large polyps, which were not resected. According to procedure note from Dr. Lezama, due to these medium/large polyps with tight angulation secondary to diverticulosis, colonoscopy was recommended at [facility] for removal. Patient had a mesenteric ultrasound during admission that showed widely patent vasculature without stenosis. She was placed on daily baby aspirin. Patient states that she is feeling well. She has chronic constipation and reports going multiple days without a bowel movement or passing only very small hard pieces of stool. She has been trying fiber, activia, colace, and metamucil. She has intentionally lost 30 pounds. Denies dysphagia, N/V, abdominal pain, melena, hematochezia. She denies any known family history of GI malignancy.    Prior GI evaluation:  Colonoscopy 11/27/2023: Multiple medium to large polyps seen with tight angulation secondary to diverticulosis. Polyps not removed.    Review of Systems:  Constitutional: No reported fever, chills, or weight loss.  Skin: No reported icterus, lesions, or rash.  Eye: No reported itching, pain, vision changes.  Ear: No reported discharge, hearing loss, or pain.  Nose: No reported congestion, discharge, or epistaxis.  Mouth/throat: No reported  dysphagia, hoarseness, or throat pain.  Resp: No reported cough, dyspnea, or wheezing.  Cardiovascular: No reported chest pain, lower extremity edema, or palpitations.   GI: +chronic constipation  : No reported dysuria, hematuria, or frequency.  Neuro: No reported confusion, memory loss, headaches, or dizziness.  Psych: No reported anxiety, depression, or insomnia.  Musculoskeletal: No reported arthralgia, joint swelling, or myalgias.  Heme/lymph: No reported easy bleeding or bruising, or swollen lymph nodes.  Endocrine: No reported cold/heat intolerance, polydipsia, or polyuria.     Medications:  Prior to Admission medications    Medication Sig Start Date End Date Taking? Authorizing Provider   albuterol 90 mcg/actuation inhaler Inhale 2 puffs every 4 hours if needed for wheezing or shortness of breath. 10/16/23   ROYAL Dela Cruz   aspirin 81 mg EC tablet Take 1 tablet (81 mg) by mouth once daily. 11/28/23 11/27/24  Judd Ramirez MD PhD   atorvastatin (Lipitor) 40 mg tablet Take 1 tablet (40 mg) by mouth once daily. 11/28/23 11/27/24  Judd Ramirze MD PhD   azelastine (Astelin) 137 mcg (0.1 %) nasal spray Administer 1 spray into each nostril 2 times a day. Use in each nostril as directed 4/16/24   ROYAL Dela Cruz   budesonide-glycopyr-formoterol (Breztri Aerosphere) 160-9-4.8 mcg/actuation HFA aerosol inhaler Inhale 2 puffs 2 times a day. 10/16/23 10/15/24  ROYAL Dela Cruz   carvedilol (Coreg) 12.5 mg tablet Take 1 tablet (12.5 mg) by mouth 2 times a day with meals. 4/9/24 4/9/25  ROYAL Wang   cetirizine (ZyrTEC) 10 mg tablet Take 1 tablet (10 mg) by mouth once daily. 4/16/24   ROYAL Dela Cruz   cholecalciferol (Vitamin D-3) 25 MCG (1000 UT) capsule Take 2 capsules (50 mcg) by mouth once daily.    Historical Provider, MD   dulaglutide (Trulicity) 0.75 mg/0.5 mL pen injector Inject 0.75 mg under the skin 1 (one) time per week. On Saturdays 12/15/23   Angelique TOMAS  HORACE Hauser   fluticasone (Flonase) 50 mcg/actuation nasal spray Administer 1 spray into each nostril 2 times a day. 4/16/24   ROYAL Dela Cruz   furosemide (Lasix) 40 mg tablet TAKE ONE TABLET BY MOUTH DAILY 3/20/24   ROYAL Wang   ipratropium-albuteroL (Duo-Neb) 0.5-2.5 mg/3 mL nebulizer solution Inhale 3 mL every 4 hours if needed. 8/18/21   Historical Provider, MD   montelukast (Singulair) 10 mg tablet Take 1 tablet (10 mg) by mouth once daily at bedtime. 12/11/23   Historical Provider, MD   multivitamin tablet Take 1 tablet by mouth once daily.    Historical Provider, MD   olmesartan (BENIcar) 40 mg tablet Take 1 tablet (40 mg) by mouth once daily. 4/9/24 10/6/24  ROYAL Wang   spironolactone (Aldactone) 25 mg tablet Take 1 tablet (25 mg) by mouth once daily. 4/9/24 7/8/24  ROYAL Wang       Allergies:  Patient has no known allergies.    Past Medical History:  She has a past medical history of COPD (chronic obstructive pulmonary disease) (Multi), Localized edema (09/14/2021), Lymphedema, not elsewhere classified (06/01/2021), Morbid (severe) obesity due to excess calories (Multi) (01/12/2022), Obesity, Obesity, unspecified (01/12/2022), Obesity, unspecified (01/12/2022), Pain in left arm (05/17/2018), Pain in right arm, Personal history of other diseases of the circulatory system, Personal history of other diseases of the circulatory system (05/17/2018), Personal history of other endocrine, nutritional and metabolic disease, Personal history of other endocrine, nutritional and metabolic disease (01/12/2022), Personal history of other endocrine, nutritional and metabolic disease, Personal history of other infectious and parasitic diseases (01/12/2022), Personal history of peptic ulcer disease, Personal history of urinary calculi, and Prediabetes (01/12/2022).    Past Surgical History:  She has a past surgical history that includes Cholecystectomy  "(05/17/2018); Tonsillectomy (05/17/2018); Other surgical history (03/12/2020); Cardiac catheterization (August 2021); and Gastric fundoplication.    Social History:  She reports that she quit smoking about 17 years ago. Her smoking use included cigarettes. She has never used smokeless tobacco. She reports that she does not currently use alcohol. She reports that she does not use drugs.    Objective   Physical exam:  Constitutional: Well developed, well nourished 58 y.o. year old in no acute distress.   Skin: Warm and dry. Normal turgor. No rash, ulcer, trauma, jaundice.   Eyes: Pupils symmetric and reactive to light.  Respiratory: Clear to auscultation bilaterally. No wheezes, rhonchi, or rales heard.  Cardiovascular: Regular rate and rhythm. S1 and S2 appreciated and normal. No murmur, rub, or gallop heard.   Edema: No edema noted.  GI: Normal bowel sounds. Soft, non-distended, non-tender. No rebound or guarding present. No hepatomegaly or splenomegaly appreciated. Abdominal aorta not palpably abnormal.  Musculoskeletal: Limbs and Joints grossly normal. Full range of motion in major joint.   Neuro: Alert and oriented x 3. Cranial nerves 2-12 grossly intact.   Psych: Normal mood and affect.        Relevant Results Recent labs reviewed in the EMR.  Lab Results   Component Value Date    HGB 12.3 11/28/2023    HGB 12.6 11/27/2023    HGB 13.9 11/26/2023     (H) 11/28/2023     (H) 11/27/2023     (H) 11/26/2023     11/28/2023     11/27/2023     11/26/2023       No results found for: \"FERRITIN\", \"IRON\"    Lab Results   Component Value Date     04/16/2024    K 4.6 04/16/2024     04/16/2024    BUN 21 04/16/2024    CREATININE 0.89 04/16/2024       Lab Results   Component Value Date    BILITOT 0.6 11/25/2023     Lab Results   Component Value Date    ALT 14 11/25/2023    ALT 12 11/21/2023    ALT 12 07/20/2023    ALT 10 01/04/2023    ALT 10 07/13/2022    AST 14 11/25/2023    " AST 11 11/21/2023    AST 11 07/20/2023    AST 10 01/04/2023    AST 10 07/13/2022    ALKPHOS 72 11/25/2023    ALKPHOS 61 11/21/2023    ALKPHOS 54 07/20/2023    ALKPHOS 59 01/04/2023    ALKPHOS 49 07/13/2022       Lab Results   Component Value Date    CRP 0.61 08/06/2020       Calprotectin, Stool   Date Value Ref Range Status   11/25/2023 1970 (H) <=49 ug/g Final     Comment:     REFERENCE INTERVAL: Calprotectin, Fecal by Immunoassay      Less than 50 ug/g.........Normal     ug/g...............Borderline elevated, test should be                              re-evaluated in 4-6 weeks.    121 ug/g or greater.......Elevated  Performed By: [facility]  [address] [place] [id number]  : Abdelrahman Waldrop MD, PhD  CLIA Number: [id number]       Radiology: Reviewed imaging reviewed in the EMR.  No results found.    Assessment/Plan   Problem List Items Addressed This Visit             ICD-10-CM    Constipation K59.00     Patient with years of constipation. Constipation has worsened some since starting Trulicity. TSH recently normal. I recommended she try daily miralax. Prescription sent.         Acute colitis K52.9     Patient with acute colitis in November 2023. Biopsies of left side were consistent with early ischemia. Mesenteric ultrasound showed patent vasculature. Abdominal pain and rectal bleeding has resolved.          Colon polyps K63.5     During colonoscopy for ischemic colitis, multiple medium to large colon polyps were seen but not resected. There was also tight angulation of the sigmoid colon due to diverticulosis. Dr. Lezama recommended colonoscopy at [facility]. I discussed this recommendation with patient. We discussed that deeper more advanced resection may be needed, along with technically more difficult procedure. Patient states that there is absolutely no way that she can get to [facility]. She states that there is no way for her to get to [facility] to have a colonoscopy. Patient  requests that I discuss with Dr. Lezama if there is anything he could do here at [place]. Will ask him to review.          Other Visit Diagnoses         Codes    Chronic constipation    -  Primary K59.09    Relevant Medications    polyethylene glycol (Glycolax, Miralax) 17 gram packet    Abnormal colonoscopy     R93.3              Margi Strickland PA-C

## 2024-05-16 NOTE — PROGRESS NOTES
Subjective   Patient ID: Nguyen Ponce is a 58 y.o. female who was presents for New Patient Visit (Colonoscopy results from November - done by Dr. Lezama during hospital stay. ).    Patient's PCP is RAYMON Hauser    PMH: cardiomyopathy, history of heart failure, PVD, HTN, HLD, DM2, adrenal adenoma, anxiety, COPD    This is established visit due to patient being admitted to the hospital.  HPI  Patient was admitted to the hospital from 11/25-11/28/2023 for acute colitis with symptoms of abdominal pain and rectal bleeding. She underwent colonoscopy with Dr. Lezama on 11/27/2023. There were inflammatory changes from splenic flexure to descending colon. Biopsies were consistent with early ischemia. There were multiple medium to large polyps, which were not resected. According to procedure note from Dr. Lezama, due to these medium/large polyps with tight angulation secondary to diverticulosis, colonoscopy was recommended at [facility] for removal. Patient had a mesenteric ultrasound during admission that showed widely patent vasculature without stenosis. She was placed on daily baby aspirin. Patient states that she is feeling well. She has chronic constipation and reports going multiple days without a bowel movement or passing only very small hard pieces of stool. She has been trying fiber, activia, colace, and metamucil. She has intentionally lost 30 pounds. Denies dysphagia, N/V, abdominal pain, melena, hematochezia. She denies any known family history of GI malignancy.    Prior GI evaluation:  Colonoscopy 11/27/2023: Multiple medium to large polyps seen with tight angulation secondary to diverticulosis. Polyps not removed.    Review of Systems:  Constitutional: No reported fever, chills, or weight loss.  Skin: No reported icterus, lesions, or rash.  Eye: No reported itching, pain, vision changes.  Ear: No reported discharge, hearing loss, or pain.  Nose: No reported congestion, discharge, or epistaxis.  Mouth/throat: No reported  dysphagia, hoarseness, or throat pain.  Resp: No reported cough, dyspnea, or wheezing.  Cardiovascular: No reported chest pain, lower extremity edema, or palpitations.   GI: +chronic constipation  : No reported dysuria, hematuria, or frequency.  Neuro: No reported confusion, memory loss, headaches, or dizziness.  Psych: No reported anxiety, depression, or insomnia.  Musculoskeletal: No reported arthralgia, joint swelling, or myalgias.  Heme/lymph: No reported easy bleeding or bruising, or swollen lymph nodes.  Endocrine: No reported cold/heat intolerance, polydipsia, or polyuria.     Medications:  Prior to Admission medications    Medication Sig Start Date End Date Taking? Authorizing Provider   albuterol 90 mcg/actuation inhaler Inhale 2 puffs every 4 hours if needed for wheezing or shortness of breath. 10/16/23   ROYAL Dela Cruz   aspirin 81 mg EC tablet Take 1 tablet (81 mg) by mouth once daily. 11/28/23 11/27/24  Judd Ramirez MD PhD   atorvastatin (Lipitor) 40 mg tablet Take 1 tablet (40 mg) by mouth once daily. 11/28/23 11/27/24  Judd Ramirez MD PhD   azelastine (Astelin) 137 mcg (0.1 %) nasal spray Administer 1 spray into each nostril 2 times a day. Use in each nostril as directed 4/16/24   ROYAL Dela Cruz   budesonide-glycopyr-formoterol (Breztri Aerosphere) 160-9-4.8 mcg/actuation HFA aerosol inhaler Inhale 2 puffs 2 times a day. 10/16/23 10/15/24  ROYAL Dela Cruz   carvedilol (Coreg) 12.5 mg tablet Take 1 tablet (12.5 mg) by mouth 2 times a day with meals. 4/9/24 4/9/25  ROYAL Wang   cetirizine (ZyrTEC) 10 mg tablet Take 1 tablet (10 mg) by mouth once daily. 4/16/24   ROYAL Dela Cruz   cholecalciferol (Vitamin D-3) 25 MCG (1000 UT) capsule Take 2 capsules (50 mcg) by mouth once daily.    Historical Provider, MD   dulaglutide (Trulicity) 0.75 mg/0.5 mL pen injector Inject 0.75 mg under the skin 1 (one) time per week. On Saturdays 12/15/23   Angelique TOMAS  HORACE Hauser   fluticasone (Flonase) 50 mcg/actuation nasal spray Administer 1 spray into each nostril 2 times a day. 4/16/24   ROYAL Dela Cruz   furosemide (Lasix) 40 mg tablet TAKE ONE TABLET BY MOUTH DAILY 3/20/24   ROYAL Wang   ipratropium-albuteroL (Duo-Neb) 0.5-2.5 mg/3 mL nebulizer solution Inhale 3 mL every 4 hours if needed. 8/18/21   Historical Provider, MD   montelukast (Singulair) 10 mg tablet Take 1 tablet (10 mg) by mouth once daily at bedtime. 12/11/23   Historical Provider, MD   multivitamin tablet Take 1 tablet by mouth once daily.    Historical Provider, MD   olmesartan (BENIcar) 40 mg tablet Take 1 tablet (40 mg) by mouth once daily. 4/9/24 10/6/24  ROYAL Wang   spironolactone (Aldactone) 25 mg tablet Take 1 tablet (25 mg) by mouth once daily. 4/9/24 7/8/24  ROYAL Wang       Allergies:  Patient has no known allergies.    Past Medical History:  She has a past medical history of COPD (chronic obstructive pulmonary disease) (Multi), Localized edema (09/14/2021), Lymphedema, not elsewhere classified (06/01/2021), Morbid (severe) obesity due to excess calories (Multi) (01/12/2022), Obesity, Obesity, unspecified (01/12/2022), Obesity, unspecified (01/12/2022), Pain in left arm (05/17/2018), Pain in right arm, Personal history of other diseases of the circulatory system, Personal history of other diseases of the circulatory system (05/17/2018), Personal history of other endocrine, nutritional and metabolic disease, Personal history of other endocrine, nutritional and metabolic disease (01/12/2022), Personal history of other endocrine, nutritional and metabolic disease, Personal history of other infectious and parasitic diseases (01/12/2022), Personal history of peptic ulcer disease, Personal history of urinary calculi, and Prediabetes (01/12/2022).    Past Surgical History:  She has a past surgical history that includes Cholecystectomy  "(05/17/2018); Tonsillectomy (05/17/2018); Other surgical history (03/12/2020); Cardiac catheterization (August 2021); and Gastric fundoplication.    Social History:  She reports that she quit smoking about 17 years ago. Her smoking use included cigarettes. She has never used smokeless tobacco. She reports that she does not currently use alcohol. She reports that she does not use drugs.    Objective   Physical exam:  Constitutional: Well developed, well nourished 58 y.o. year old in no acute distress.   Skin: Warm and dry. Normal turgor. No rash, ulcer, trauma, jaundice.   Eyes: Pupils symmetric and reactive to light.  Respiratory: Clear to auscultation bilaterally. No wheezes, rhonchi, or rales heard.  Cardiovascular: Regular rate and rhythm. S1 and S2 appreciated and normal. No murmur, rub, or gallop heard.   Edema: No edema noted.  GI: Normal bowel sounds. Soft, non-distended, non-tender. No rebound or guarding present. No hepatomegaly or splenomegaly appreciated. Abdominal aorta not palpably abnormal.  Musculoskeletal: Limbs and Joints grossly normal. Full range of motion in major joint.   Neuro: Alert and oriented x 3. Cranial nerves 2-12 grossly intact.   Psych: Normal mood and affect.        Relevant Results Recent labs reviewed in the EMR.  Lab Results   Component Value Date    HGB 12.3 11/28/2023    HGB 12.6 11/27/2023    HGB 13.9 11/26/2023     (H) 11/28/2023     (H) 11/27/2023     (H) 11/26/2023     11/28/2023     11/27/2023     11/26/2023       No results found for: \"FERRITIN\", \"IRON\"    Lab Results   Component Value Date     04/16/2024    K 4.6 04/16/2024     04/16/2024    BUN 21 04/16/2024    CREATININE 0.89 04/16/2024       Lab Results   Component Value Date    BILITOT 0.6 11/25/2023     Lab Results   Component Value Date    ALT 14 11/25/2023    ALT 12 11/21/2023    ALT 12 07/20/2023    ALT 10 01/04/2023    ALT 10 07/13/2022    AST 14 11/25/2023    " AST 11 11/21/2023    AST 11 07/20/2023    AST 10 01/04/2023    AST 10 07/13/2022    ALKPHOS 72 11/25/2023    ALKPHOS 61 11/21/2023    ALKPHOS 54 07/20/2023    ALKPHOS 59 01/04/2023    ALKPHOS 49 07/13/2022       Lab Results   Component Value Date    CRP 0.61 08/06/2020       Calprotectin, Stool   Date Value Ref Range Status   11/25/2023 1970 (H) <=49 ug/g Final     Comment:     REFERENCE INTERVAL: Calprotectin, Fecal by Immunoassay      Less than 50 ug/g.........Normal     ug/g...............Borderline elevated, test should be                              re-evaluated in 4-6 weeks.    121 ug/g or greater.......Elevated  Performed By: [facility]  [address] [place] [id number]  : Abdelrahman Waldrop MD, PhD  CLIA Number: [id number]       Radiology: Reviewed imaging reviewed in the EMR.  No results found.    Assessment/Plan   Problem List Items Addressed This Visit             ICD-10-CM    Constipation K59.00     Patient with years of constipation. Constipation has worsened some since starting Trulicity. TSH recently normal. I recommended she try daily miralax. Prescription sent.         Acute colitis K52.9     Patient with acute colitis in November 2023. Biopsies of left side were consistent with early ischemia. Mesenteric ultrasound showed patent vasculature. Abdominal pain and rectal bleeding has resolved.          Colon polyps K63.5     During colonoscopy for ischemic colitis, multiple medium to large colon polyps were seen but not resected. There was also tight angulation of the sigmoid colon due to diverticulosis. Dr. Lezama recommended colonoscopy at [facility]. I discussed this recommendation with patient. We discussed that deeper more advanced resection may be needed, along with technically more difficult procedure. Patient states that there is absolutely no way that she can get to [facility]. She states that there is no way for her to get to [facility] to have a colonoscopy. Patient  requests that I discuss with Dr. Lezama if there is anything he could do here at [place]. Will ask him to review.          Other Visit Diagnoses         Codes    Chronic constipation    -  Primary K59.09    Relevant Medications    polyethylene glycol (Glycolax, Miralax) 17 gram packet    Abnormal colonoscopy     R93.3              Margi Strickland PA-C

## 2024-05-16 NOTE — ASSESSMENT & PLAN NOTE
During colonoscopy for ischemic colitis, multiple medium to large colon polyps were seen but not resected. There was also tight angulation of the sigmoid colon due to diverticulosis. Dr. Lezama recommended colonoscopy at [facility]. I discussed this recommendation with patient. We discussed that deeper more advanced resection may be needed, along with technically more difficult procedure. Patient states that there is absolutely no way that she can get to [facility]. She states that there is no way for her to get to [facility] to have a colonoscopy. Patient requests that I discuss with Dr. Lezama if there is anything he could do here at [place]. Will ask him to review.

## 2024-05-17 DIAGNOSIS — K63.5 POLYP OF SIGMOID COLON, UNSPECIFIED TYPE: Primary | ICD-10-CM

## 2024-05-17 RX ORDER — POLYETHYLENE GLYCOL 3350, SODIUM SULFATE ANHYDROUS, SODIUM BICARBONATE, SODIUM CHLORIDE, POTASSIUM CHLORIDE 236; 22.74; 6.74; 5.86; 2.97 G/4L; G/4L; G/4L; G/4L; G/4L
4000 POWDER, FOR SOLUTION ORAL ONCE
Qty: 4000 ML | Refills: 0 | Status: SHIPPED | OUTPATIENT
Start: 2024-05-17 | End: 2024-05-17

## 2024-05-17 NOTE — PROGRESS NOTES
Please schedule colonoscopy with Dr. Lezama in endo. Golytely prescribed    Meds to hold: Trulicity (1 dose before)

## 2024-05-20 ENCOUNTER — TELEPHONE (OUTPATIENT)
Dept: GASTROENTEROLOGY | Facility: CLINIC | Age: 59
End: 2024-05-20
Payer: COMMERCIAL

## 2024-05-20 NOTE — TELEPHONE ENCOUNTER
----- Message from Brunilda Astudillo MA sent at 5/20/2024 11:03 AM EDT -----  Regarding: RE: Case chu  Patient is scheduled for 5.30.24 with Dr. Lezama  Packet sent via [employer]  ----- Message -----  From: Margi Strickland PA-C  Sent: 5/17/2024   1:52 PM EDT  To: Brunilda Astudillo MA  Subject: FW: Case chu                                  Please schedule colonoscopy with Dr. Lezama in endo. Golytely prescribed    Meds to hold: Trulicity (1 dose before)  ----- Message -----  From: Brian Lezama MD  Sent: 5/17/2024   9:03 AM EDT  To: Margi Strickland PA-C  Subject: RE: Case chu                                  We can try here, but she needs to be fully aware that there is a good possibility that I won't be able to remove all of the polyps that are present. If that is the case then she would need another colonoscopy to remove those polyps. She will also need someone to drive her here.    -k      ----- Message -----  From: Margi Strickland PA-C  Sent: 5/16/2024   4:39 PM EDT  To: Brian Lezama MD  Subject: Case reivew                                      Hi Dr. Lezama,     I saw Nguyen Ponce today in office for hospital follow up. She was admitted to [facility] in November for acute colitis and was found to have ischemic colitis. She is just now following up with me. Ischemic colitis symptoms have resolved. During colonoscopy while inpatient, you saw several medium to large colon polyps, which were not resected due to the ischemic colitis. Due to the size of the polyps and patient's diverticulosis causing angulation and tightness, colonoscopy at [facility] was recommended for resection of polyps. I discussed this recommendation with patient at her appointment today. Patient asked me to personally send you a message you to see if there was anything we could do at [place]. She states that she financially has no way to get to [facility] and noone to take her that far.     Thanks.  Margi

## 2024-05-21 ENCOUNTER — APPOINTMENT (OUTPATIENT)
Dept: PRIMARY CARE | Facility: CLINIC | Age: 59
End: 2024-05-21
Payer: COMMERCIAL

## 2024-05-29 ENCOUNTER — ANESTHESIA EVENT (OUTPATIENT)
Dept: GASTROENTEROLOGY | Facility: HOSPITAL | Age: 59
End: 2024-05-29
Payer: COMMERCIAL

## 2024-05-29 ENCOUNTER — PREP FOR PROCEDURE (OUTPATIENT)
Dept: GASTROENTEROLOGY | Facility: CLINIC | Age: 59
End: 2024-05-29
Payer: COMMERCIAL

## 2024-05-29 ENCOUNTER — TELEPHONE (OUTPATIENT)
Dept: GASTROENTEROLOGY | Facility: CLINIC | Age: 59
End: 2024-05-29
Payer: COMMERCIAL

## 2024-05-29 RX ORDER — SODIUM CHLORIDE 9 MG/ML
20 INJECTION, SOLUTION INTRAVENOUS CONTINUOUS
Status: CANCELLED | OUTPATIENT
Start: 2024-05-29

## 2024-05-29 NOTE — TELEPHONE ENCOUNTER
PT HAS A COLONOSCOPY SCHEDULED FOR TOMORROW MORNING.  PT HAS HAD GAS AND EXTREME BLOATING WHEN EATING SINCE FRIDAY.  SHE STATES THAT EVEN JUST WITH WATER TODAY SHE IS EXTREMELY BLOATED AND GASSY AND DOESN'T FEEL HUNGRY AT ALL.  SHE WANTS TO KNOW IF IT IS OK WITH HER HAVING THE PROCEDURE TOMORROW.

## 2024-05-30 ENCOUNTER — ANESTHESIA (OUTPATIENT)
Dept: GASTROENTEROLOGY | Facility: HOSPITAL | Age: 59
End: 2024-05-30
Payer: COMMERCIAL

## 2024-05-30 ENCOUNTER — HOSPITAL ENCOUNTER (OUTPATIENT)
Dept: GASTROENTEROLOGY | Facility: HOSPITAL | Age: 59
Discharge: HOME | End: 2024-05-30
Payer: COMMERCIAL

## 2024-05-30 VITALS
BODY MASS INDEX: 33.82 KG/M2 | SYSTOLIC BLOOD PRESSURE: 115 MMHG | OXYGEN SATURATION: 98 % | RESPIRATION RATE: 16 BRPM | HEART RATE: 66 BPM | WEIGHT: 203 LBS | HEIGHT: 65 IN | DIASTOLIC BLOOD PRESSURE: 74 MMHG | TEMPERATURE: 98.1 F

## 2024-05-30 DIAGNOSIS — K63.5 POLYP OF SIGMOID COLON, UNSPECIFIED TYPE: ICD-10-CM

## 2024-05-30 PROBLEM — K55.9 ISCHEMIC COLITIS (MULTI): Status: ACTIVE | Noted: 2023-11-25

## 2024-05-30 PROBLEM — K55.9 ISCHEMIC COLITIS (MULTI): Status: RESOLVED | Noted: 2023-11-25 | Resolved: 2024-05-30

## 2024-05-30 LAB — GLUCOSE BLD MANUAL STRIP-MCNC: 108 MG/DL (ref 74–99)

## 2024-05-30 PROCEDURE — 2500000004 HC RX 250 GENERAL PHARMACY W/ HCPCS (ALT 636 FOR OP/ED): Performed by: INTERNAL MEDICINE

## 2024-05-30 PROCEDURE — 3700000002 HC GENERAL ANESTHESIA TIME - EACH INCREMENTAL 1 MINUTE

## 2024-05-30 PROCEDURE — 45385 COLONOSCOPY W/LESION REMOVAL: CPT | Performed by: INTERNAL MEDICINE

## 2024-05-30 PROCEDURE — 3700000001 HC GENERAL ANESTHESIA TIME - INITIAL BASE CHARGE

## 2024-05-30 PROCEDURE — 7100000009 HC PHASE TWO TIME - INITIAL BASE CHARGE

## 2024-05-30 PROCEDURE — 2500000005 HC RX 250 GENERAL PHARMACY W/O HCPCS: Performed by: NURSE ANESTHETIST, CERTIFIED REGISTERED

## 2024-05-30 PROCEDURE — 0753T DGTZ GLS MCRSCP SLD LEVEL IV: CPT | Mod: TC,PORLAB | Performed by: INTERNAL MEDICINE

## 2024-05-30 PROCEDURE — 2500000004 HC RX 250 GENERAL PHARMACY W/ HCPCS (ALT 636 FOR OP/ED): Performed by: NURSE ANESTHETIST, CERTIFIED REGISTERED

## 2024-05-30 PROCEDURE — 82947 ASSAY GLUCOSE BLOOD QUANT: CPT

## 2024-05-30 PROCEDURE — 7100000010 HC PHASE TWO TIME - EACH INCREMENTAL 1 MINUTE

## 2024-05-30 RX ORDER — LIDOCAINE HCL/PF 100 MG/5ML
SYRINGE (ML) INTRAVENOUS AS NEEDED
Status: DISCONTINUED | OUTPATIENT
Start: 2024-05-30 | End: 2024-05-30

## 2024-05-30 RX ORDER — SODIUM CHLORIDE 9 MG/ML
20 INJECTION, SOLUTION INTRAVENOUS CONTINUOUS
Status: DISCONTINUED | OUTPATIENT
Start: 2024-05-30 | End: 2024-05-31 | Stop reason: HOSPADM

## 2024-05-30 RX ORDER — PROPOFOL 10 MG/ML
INJECTION, EMULSION INTRAVENOUS AS NEEDED
Status: DISCONTINUED | OUTPATIENT
Start: 2024-05-30 | End: 2024-05-30

## 2024-05-30 RX ADMIN — SODIUM CHLORIDE: 9 INJECTION, SOLUTION INTRAVENOUS at 08:23

## 2024-05-30 RX ADMIN — PROPOFOL 400 MG: 10 INJECTION, EMULSION INTRAVENOUS at 08:31

## 2024-05-30 RX ADMIN — LIDOCAINE HYDROCHLORIDE 100 MG: 20 INJECTION, SOLUTION INTRAVENOUS at 08:31

## 2024-05-30 RX ADMIN — SODIUM CHLORIDE 20 ML/HR: 9 INJECTION, SOLUTION INTRAVENOUS at 08:30

## 2024-05-30 SDOH — HEALTH STABILITY: MENTAL HEALTH: CURRENT SMOKER: 0

## 2024-05-30 ASSESSMENT — PAIN - FUNCTIONAL ASSESSMENT
PAIN_FUNCTIONAL_ASSESSMENT: 0-10

## 2024-05-30 ASSESSMENT — PAIN SCALES - GENERAL
PAINLEVEL_OUTOF10: 0 - NO PAIN

## 2024-05-30 NOTE — ANESTHESIA PREPROCEDURE EVALUATION
Patient: Nguyen Ponce    Procedure Information       Date/Time: 05/30/24 0830    Scheduled providers: Brian Lezama MD    Procedure: COLONOSCOPY    Location: [facility]            Relevant Problems   Anesthesia (within normal limits)      Cardiac   (+) Benign essential hypertension   (+) Congestive heart failure (Multi)   (+) Hyperlipidemia   (+) Hypertension   (+) Peripheral vascular disease (CMS-HCC)      Pulmonary   (+) Chronic obstructive pulmonary disease (Multi)   (+) Pneumonia due to infectious organism      Neuro   (+) Anxiety   (+) Bilateral low back pain with bilateral sciatica   (+) Cervical radiculopathy, chronic   (+) Depressive disorder      Endocrine   (+) Morbid obesity (Multi)   (+) Type 2 diabetes mellitus (Multi)      Musculoskeletal   (+) Cervical spondylosis   (+) Degeneration of intervertebral disc of cervical region   (+) Degeneration of intervertebral disc of lumbar region   (+) Lumbar spondylosis      ID   (+) Dermatophytosis   (+) Pneumonia due to infectious organism   (+) Upper respiratory tract infection       Clinical information reviewed:    Allergies                NPO Detail:  No data recorded     Physical Exam    Airway  Mallampati: III     Cardiovascular - normal exam     Dental    Pulmonary - normal exam     Abdominal            Anesthesia Plan    History of general anesthesia?: yes  History of complications of general anesthesia?: no    ASA 3     MAC     The patient is not a current smoker.    Anesthetic plan and risks discussed with patient.  Use of blood products discussed with who consented to blood products.

## 2024-05-30 NOTE — ANESTHESIA POSTPROCEDURE EVALUATION
Patient: Nguyen Ponce    Procedure Summary       Date: 05/30/24 Room / Location: [facility]    Anesthesia Start: 0823 Anesthesia Stop: 0907    Procedure: COLONOSCOPY Diagnosis: Polyp of sigmoid colon, unspecified type    Scheduled Providers: Brian Lezama MD Responsible Provider: TIMMY Srinivasan    Anesthesia Type: MAC ASA Status: 3            Anesthesia Type: MAC    Vitals Value Taken Time   /82 05/30/24 0903   Temp 37.1 °C (98.7 °F) 05/30/24 0903   Pulse 71 05/30/24 0903   Resp 16 05/30/24 0903   SpO2 94 % 05/30/24 0903       Anesthesia Post Evaluation    Patient location during evaluation: bedside  Patient participation: complete - patient participated  Level of consciousness: awake  Pain management: adequate  Airway patency: patent  Cardiovascular status: acceptable  Respiratory status: acceptable  Hydration status: acceptable  Postoperative Nausea and Vomiting: none    There were no known notable events for this encounter.

## 2024-05-31 NOTE — ADDENDUM NOTE
Encounter addended by: Brian Matthews RN on: 5/31/2024 12:04 PM   Actions taken: Contacts section saved, Flowsheet accepted

## 2024-06-04 DIAGNOSIS — I10 BENIGN ESSENTIAL HYPERTENSION: ICD-10-CM

## 2024-06-04 RX ORDER — SPIRONOLACTONE 25 MG/1
25 TABLET ORAL DAILY
Qty: 90 TABLET | Refills: 2 | Status: SHIPPED | OUTPATIENT
Start: 2024-06-04 | End: 2025-03-01

## 2024-07-03 ENCOUNTER — LAB (OUTPATIENT)
Dept: LAB | Facility: LAB | Age: 59
End: 2024-07-03
Payer: COMMERCIAL

## 2024-07-03 ENCOUNTER — APPOINTMENT (OUTPATIENT)
Dept: PRIMARY CARE | Facility: CLINIC | Age: 59
End: 2024-07-03
Payer: COMMERCIAL

## 2024-07-03 VITALS
DIASTOLIC BLOOD PRESSURE: 70 MMHG | HEIGHT: 65 IN | SYSTOLIC BLOOD PRESSURE: 110 MMHG | WEIGHT: 203 LBS | HEART RATE: 88 BPM | BODY MASS INDEX: 33.82 KG/M2

## 2024-07-03 DIAGNOSIS — J44.9 CHRONIC OBSTRUCTIVE PULMONARY DISEASE, UNSPECIFIED COPD TYPE (MULTI): ICD-10-CM

## 2024-07-03 DIAGNOSIS — Z12.11 COLON CANCER SCREENING: ICD-10-CM

## 2024-07-03 DIAGNOSIS — E11.65 TYPE 2 DIABETES MELLITUS WITH HYPERGLYCEMIA, WITHOUT LONG-TERM CURRENT USE OF INSULIN (MULTI): ICD-10-CM

## 2024-07-03 DIAGNOSIS — Z00.00 HEALTHCARE MAINTENANCE: Primary | ICD-10-CM

## 2024-07-03 DIAGNOSIS — E55.9 VITAMIN D DEFICIENCY: ICD-10-CM

## 2024-07-03 DIAGNOSIS — I10 BENIGN ESSENTIAL HYPERTENSION: ICD-10-CM

## 2024-07-03 DIAGNOSIS — I50.22 SYSTOLIC HEART FAILURE, CHRONIC (MULTI): ICD-10-CM

## 2024-07-03 DIAGNOSIS — E78.2 MIXED HYPERLIPIDEMIA: ICD-10-CM

## 2024-07-03 DIAGNOSIS — Z12.31 VISIT FOR SCREENING MAMMOGRAM: ICD-10-CM

## 2024-07-03 LAB
25(OH)D3 SERPL-MCNC: 45 NG/ML (ref 30–100)
ALBUMIN SERPL BCP-MCNC: 4.5 G/DL (ref 3.4–5)
ALP SERPL-CCNC: 63 U/L (ref 33–110)
ALT SERPL W P-5'-P-CCNC: 16 U/L (ref 7–45)
ANION GAP SERPL CALC-SCNC: 14 MMOL/L (ref 10–20)
AST SERPL W P-5'-P-CCNC: 14 U/L (ref 9–39)
BASOPHILS # BLD AUTO: 0.09 X10*3/UL (ref 0–0.1)
BASOPHILS NFR BLD AUTO: 0.9 %
BILIRUB SERPL-MCNC: 0.6 MG/DL (ref 0–1.2)
BUN SERPL-MCNC: 21 MG/DL (ref 6–23)
CALCIUM SERPL-MCNC: 9.6 MG/DL (ref 8.6–10.3)
CHLORIDE SERPL-SCNC: 102 MMOL/L (ref 98–107)
CHOLEST SERPL-MCNC: 114 MG/DL (ref 0–199)
CHOLESTEROL/HDL RATIO: 2.8
CO2 SERPL-SCNC: 26 MMOL/L (ref 21–32)
CREAT SERPL-MCNC: 0.84 MG/DL (ref 0.5–1.05)
EGFRCR SERPLBLD CKD-EPI 2021: 81 ML/MIN/1.73M*2
EOSINOPHIL # BLD AUTO: 0.28 X10*3/UL (ref 0–0.7)
EOSINOPHIL NFR BLD AUTO: 2.7 %
ERYTHROCYTE [DISTWIDTH] IN BLOOD BY AUTOMATED COUNT: 13.2 % (ref 11.5–14.5)
EST. AVERAGE GLUCOSE BLD GHB EST-MCNC: 137 MG/DL
GLUCOSE SERPL-MCNC: 95 MG/DL (ref 74–99)
HBA1C MFR BLD: 6.4 %
HCT VFR BLD AUTO: 45.8 % (ref 36–46)
HCV AB SER QL: NONREACTIVE
HDLC SERPL-MCNC: 40.4 MG/DL
HGB BLD-MCNC: 15.3 G/DL (ref 12–16)
IMM GRANULOCYTES # BLD AUTO: 0.05 X10*3/UL (ref 0–0.7)
IMM GRANULOCYTES NFR BLD AUTO: 0.5 % (ref 0–0.9)
LDLC SERPL CALC-MCNC: 50 MG/DL
LYMPHOCYTES # BLD AUTO: 2.64 X10*3/UL (ref 1.2–4.8)
LYMPHOCYTES NFR BLD AUTO: 25.9 %
MCH RBC QN AUTO: 34.5 PG (ref 26–34)
MCHC RBC AUTO-ENTMCNC: 33.4 G/DL (ref 32–36)
MCV RBC AUTO: 103 FL (ref 80–100)
MONOCYTES # BLD AUTO: 0.64 X10*3/UL (ref 0.1–1)
MONOCYTES NFR BLD AUTO: 6.3 %
NEUTROPHILS # BLD AUTO: 6.5 X10*3/UL (ref 1.2–7.7)
NEUTROPHILS NFR BLD AUTO: 63.7 %
NON HDL CHOLESTEROL: 74 MG/DL (ref 0–149)
NRBC BLD-RTO: 0 /100 WBCS (ref 0–0)
PLATELET # BLD AUTO: 342 X10*3/UL (ref 150–450)
POTASSIUM SERPL-SCNC: 4.5 MMOL/L (ref 3.5–5.3)
PROT SERPL-MCNC: 6.8 G/DL (ref 6.4–8.2)
RBC # BLD AUTO: 4.44 X10*6/UL (ref 4–5.2)
SODIUM SERPL-SCNC: 137 MMOL/L (ref 136–145)
TRIGL SERPL-MCNC: 118 MG/DL (ref 0–149)
TSH SERPL-ACNC: 2.13 MIU/L (ref 0.44–3.98)
VIT B12 SERPL-MCNC: 422 PG/ML (ref 211–911)
VLDL: 24 MG/DL (ref 0–40)
WBC # BLD AUTO: 10.2 X10*3/UL (ref 4.4–11.3)

## 2024-07-03 PROCEDURE — 80061 LIPID PANEL: CPT

## 2024-07-03 PROCEDURE — 83036 HEMOGLOBIN GLYCOSYLATED A1C: CPT

## 2024-07-03 PROCEDURE — 3074F SYST BP LT 130 MM HG: CPT | Performed by: CLINICAL NURSE SPECIALIST

## 2024-07-03 PROCEDURE — 82306 VITAMIN D 25 HYDROXY: CPT

## 2024-07-03 PROCEDURE — 86803 HEPATITIS C AB TEST: CPT

## 2024-07-03 PROCEDURE — 3078F DIAST BP <80 MM HG: CPT | Performed by: CLINICAL NURSE SPECIALIST

## 2024-07-03 PROCEDURE — 1036F TOBACCO NON-USER: CPT | Performed by: CLINICAL NURSE SPECIALIST

## 2024-07-03 PROCEDURE — 84443 ASSAY THYROID STIM HORMONE: CPT

## 2024-07-03 PROCEDURE — 36415 COLL VENOUS BLD VENIPUNCTURE: CPT

## 2024-07-03 PROCEDURE — 85025 COMPLETE CBC W/AUTO DIFF WBC: CPT

## 2024-07-03 PROCEDURE — 99214 OFFICE O/P EST MOD 30 MIN: CPT | Performed by: CLINICAL NURSE SPECIALIST

## 2024-07-03 PROCEDURE — 82607 VITAMIN B-12: CPT

## 2024-07-03 PROCEDURE — 4010F ACE/ARB THERAPY RXD/TAKEN: CPT | Performed by: CLINICAL NURSE SPECIALIST

## 2024-07-03 PROCEDURE — 80053 COMPREHEN METABOLIC PANEL: CPT

## 2024-07-03 RX ORDER — SPIRONOLACTONE 25 MG/1
25 TABLET ORAL DAILY
Qty: 90 TABLET | Refills: 2 | Status: SHIPPED | OUTPATIENT
Start: 2024-07-03 | End: 2025-03-30

## 2024-07-03 RX ORDER — DULAGLUTIDE 0.75 MG/.5ML
0.75 INJECTION, SOLUTION SUBCUTANEOUS
Qty: 2 ML | Refills: 5 | Status: SHIPPED | OUTPATIENT
Start: 2024-07-07

## 2024-07-03 ASSESSMENT — ENCOUNTER SYMPTOMS
CONSTIPATION: 0
NAUSEA: 0
BLOOD IN STOOL: 0
UNEXPECTED WEIGHT CHANGE: 0
APPETITE CHANGE: 0
BRUISES/BLEEDS EASILY: 0
SORE THROAT: 0
CONFUSION: 0
ARTHRALGIAS: 0
JOINT SWELLING: 0
EYE PAIN: 0
HEADACHES: 0
BACK PAIN: 0
POLYDIPSIA: 0
NECK PAIN: 0
WOUND: 0
WHEEZING: 0
PALPITATIONS: 0
ACTIVITY CHANGE: 0
FEVER: 0
SEIZURES: 0
DIARRHEA: 0
FATIGUE: 0
PHOTOPHOBIA: 0
HEMATURIA: 0
SLEEP DISTURBANCE: 0
DIZZINESS: 0
LOSS OF SENSATION IN FEET: 0
FLANK PAIN: 0
COUGH: 0
CHEST TIGHTNESS: 0
DEPRESSION: 0
VOMITING: 0
ABDOMINAL PAIN: 0
TROUBLE SWALLOWING: 0
MYALGIAS: 0
DYSURIA: 0
OCCASIONAL FEELINGS OF UNSTEADINESS: 0
CHILLS: 0
SHORTNESS OF BREATH: 0

## 2024-07-03 ASSESSMENT — PATIENT HEALTH QUESTIONNAIRE - PHQ9
1. LITTLE INTEREST OR PLEASURE IN DOING THINGS: NOT AT ALL
2. FEELING DOWN, DEPRESSED OR HOPELESS: NOT AT ALL
SUM OF ALL RESPONSES TO PHQ9 QUESTIONS 1 AND 2: 0

## 2024-07-03 NOTE — PROGRESS NOTES
"Subjective   Patient ID: Nguyen Ponce is a 58 y.o. female who presents for Follow-up (Follow up).  HPI    Here today as a follow up appointment.     Following with Dr. Mari for Cardiology, diagnosed with Heart failure in 2021. Hypertension. Long standing history of Hypertension, uncontrolled. Medications now adjusted by Cardiology. Cardiac MRI and ECHO completed. Monitoring her blood pressure at home, states that it will fluctuate. Follow up scheduled for this month with Dr. Mari.      Complaints of Arthritis. Previously followed with Rheumatology, not interested in following back with them. Using Two Tylenol ES daily. Some improvement after using medication. Did not tolerate Celebrex. Right hand dominant. Worse with getting up in the AM and picking anything up in her hand. \"Tender.\" Feels that it extends down into her hand. Did not have a good response with Rheumatologist in the past.      Neck pain has improved. Able to improve her ROM. Pain down into the Buttocks, radiating down.      COPD. Following with Milena DUFFY for Pulmonology. Inhaler adjustment.      Patient states that she has chronic issues with Fatigue, cardiac medications adjusted by Cardiology.      Last lab work indicated Diabetes. Has been making some dietary changes to help with her numbers. Does not monitor her blood sugars at home. Started on Metformin, no longer tolerating medication. GI, increased gas. No improvement with XR. Has not tried any other medications besides the Metformin in the past. Trulicity added and is tolerating Trulicity better, still questioning if triggering her GI discomfort. Would like to consider discontinuing medication based on lab results. Has been continuing to work on making healthy dietary changes.        Review of Systems   Constitutional:  Negative for activity change, appetite change, chills, fatigue, fever and unexpected weight change.   HENT:  Negative for ear pain, hearing loss, nosebleeds, sore throat, " tinnitus and trouble swallowing.    Eyes:  Negative for photophobia, pain and visual disturbance.   Respiratory:  Negative for cough, chest tightness, shortness of breath and wheezing.    Cardiovascular:  Negative for chest pain, palpitations and leg swelling.   Gastrointestinal:  Negative for abdominal pain, blood in stool, constipation, diarrhea, nausea and vomiting.   Endocrine: Negative for cold intolerance, heat intolerance, polydipsia and polyuria.   Genitourinary:  Negative for dysuria, flank pain and hematuria.   Musculoskeletal:  Negative for arthralgias, back pain, joint swelling, myalgias and neck pain.   Skin:  Negative for pallor, rash and wound.   Allergic/Immunologic: Negative for immunocompromised state.   Neurological:  Negative for dizziness, seizures and headaches.   Hematological:  Does not bruise/bleed easily.   Psychiatric/Behavioral:  Negative for confusion and sleep disturbance.        Objective   Physical Exam  Vitals and nursing note reviewed.   Constitutional:       General: She is not in acute distress.     Appearance: Normal appearance.   HENT:      Head: Normocephalic.      Nose: Nose normal.   Eyes:      Conjunctiva/sclera: Conjunctivae normal.   Neck:      Vascular: No carotid bruit.   Cardiovascular:      Rate and Rhythm: Normal rate and regular rhythm.      Pulses: Normal pulses.      Heart sounds: Normal heart sounds.   Pulmonary:      Effort: Pulmonary effort is normal.      Breath sounds: Normal breath sounds.   Abdominal:      General: Bowel sounds are normal.      Palpations: Abdomen is soft.   Musculoskeletal:         General: Normal range of motion.      Cervical back: Normal range of motion.   Skin:     General: Skin is warm and dry.   Neurological:      Mental Status: She is alert and oriented to person, place, and time. Mental status is at baseline.   Psychiatric:         Mood and Affect: Mood normal.         Behavior: Behavior normal.       Assessment/Plan          Reviewed recent Specialist appointments with patient. Due for updated lab work.      Cardiomyopathy, Hypertension, Heart Failure, Palpitations: Following with Cardiology. Continue medications as prescribed by Cardiology.   COPD: Following with Pulmonology for management.   Obesity: BMI: 33.78. Lifestyle changes recommended: Diet consisting of low fat foods, lean meats, high fiber, fresh fruits and vegetables. 150 min/ weekly aerobic exercise.  Diabetes: Last A1C 6.5%. Lifestyle changes as noted above. Discontinued Metformin, due to tolerance. Monitor blood sugar readings. Call with blood sugar readings. Declined referral for Nutrition at this time. Continue Trulicity until updated lab work completed.   Joint Pain: Will continue Tylenol.   Depression/Anxiety: Counseling information provided for patient at last OV.   Vitamin D Deficiency: Vitamin D.      COVID Vaccine: May/June 2021.   Mammogram: December 2021. Ordered for 2024.   Colonoscopy: May 2024, plan to repeat in 3 years.   Unable to update immunizations due to Insurance.   LDCT: October 2023, plan to repeat in 1 year.   GYN referral.    CATALINA Duenas-CNS 07/03/24 9:32 AM

## 2024-07-05 ENCOUNTER — TELEPHONE (OUTPATIENT)
Dept: PRIMARY CARE | Facility: CLINIC | Age: 59
End: 2024-07-05
Payer: COMMERCIAL

## 2024-07-05 NOTE — TELEPHONE ENCOUNTER
----- Message from CATALINA Duenas-CNS sent at 7/4/2024  8:51 AM EDT -----  Please call patient with lab results. A1C is well controlled at 6.4%. Ok to stop Trulicity. Have her repeat CMP, A1C with an OV in 3 months to make sure that her sugar stays controlled without medication. Thank you!

## 2024-07-09 ENCOUNTER — APPOINTMENT (OUTPATIENT)
Dept: CARDIOLOGY | Facility: CLINIC | Age: 59
End: 2024-07-09
Payer: COMMERCIAL

## 2024-07-15 ENCOUNTER — APPOINTMENT (OUTPATIENT)
Dept: RADIOLOGY | Facility: HOSPITAL | Age: 59
End: 2024-07-15
Payer: COMMERCIAL

## 2024-07-19 ENCOUNTER — HOSPITAL ENCOUNTER (OUTPATIENT)
Dept: RADIOLOGY | Facility: HOSPITAL | Age: 59
Discharge: HOME | End: 2024-07-19
Payer: COMMERCIAL

## 2024-07-19 VITALS — WEIGHT: 203 LBS | HEIGHT: 65 IN | BODY MASS INDEX: 33.82 KG/M2

## 2024-07-19 DIAGNOSIS — Z12.31 VISIT FOR SCREENING MAMMOGRAM: ICD-10-CM

## 2024-07-19 PROCEDURE — 77067 SCR MAMMO BI INCL CAD: CPT | Performed by: RADIOLOGY

## 2024-07-19 PROCEDURE — 77063 BREAST TOMOSYNTHESIS BI: CPT | Performed by: RADIOLOGY

## 2024-07-19 PROCEDURE — 77063 BREAST TOMOSYNTHESIS BI: CPT

## 2024-08-02 ENCOUNTER — TELEPHONE (OUTPATIENT)
Dept: CARDIOLOGY | Facility: HOSPITAL | Age: 59
End: 2024-08-02
Payer: COMMERCIAL

## 2024-08-02 NOTE — TELEPHONE ENCOUNTER
Called patient and left voicemail in regards to there apt 8/8. Cancelled apt and gave on voicemail new day and time. If it doesn't work for them to call our office.

## 2024-08-05 ENCOUNTER — TELEPHONE (OUTPATIENT)
Dept: CARDIOLOGY | Facility: HOSPITAL | Age: 59
End: 2024-08-05
Payer: COMMERCIAL

## 2024-08-05 NOTE — PROGRESS NOTES
"Primary Cardiologist: Dr. Mari    Chief Complaint:   The patient presents today for 3-month followup of heart failure, palpitations and HTN.      History Of Present Illness:    Nguyen Ponce is a 58 year old female patient who presents today for 3-month followup of heart failure, palpitations and HTN. Her PMH is significant for HTN, anxiety, COPD, depression, nicotine dependence, obesity, PVD, palpitations, chronic systolic heart failure, and h/o pneumonia. She presents today for 3 month follow up.   Today, patient is feeling very well, she reports she has lost 30# over the last several months with change in diet.   Denies chest pain/pressure, no dizziness or lightheadedness, no shortness of breath, and no palpitations. She denies lower extremity edema, orthopnea or PND.      Last Recorded Vitals:  Vitals:    08/06/24 0934   BP: 100/70   Pulse: 51   SpO2: 97%   Weight: 92.1 kg (203 lb)   Height: 1.651 m (5' 5\")       Past Surgical History:  She has a past surgical history that includes Cholecystectomy (05/17/2018); Tonsillectomy (05/17/2018); Other surgical history (03/12/2020); Cardiac catheterization (August 2021); and Gastric fundoplication.      Social History:  She reports that she quit smoking about 17 years ago. Her smoking use included cigarettes. She has never used smokeless tobacco. She reports that she does not currently use alcohol. She reports that she does not use drugs.    Family History:  Family History   Problem Relation Name Age of Onset    Hypertension Mother Ree Marion     Other (palpitations) Mother Ree Marion     Other (palpitations) Sister      Other (stomach issues) Sister      Diabetes Daughter      Brain Aneurysm Mother's Sister      Brain Aneurysm Paternal Grandmother      Brain Aneurysm Paternal Grandfather          Allergies:  Patient has no known allergies.    Outpatient Medications:  Current Outpatient Medications   Medication Instructions    albuterol 90 mcg/actuation inhaler 2 " puffs, inhalation, Every 4 hours PRN    aspirin 81 mg, oral, Daily    atorvastatin (LIPITOR) 40 mg, oral, Daily    azelastine (Astelin) 137 mcg (0.1 %) nasal spray 1 spray, Each Nostril, 2 times daily, Use in each nostril as directed    budesonide-glycopyr-formoterol (Breztri Aerosphere) 160-9-4.8 mcg/actuation HFA aerosol inhaler 2 puffs, inhalation, 2 times daily RT    carvedilol (COREG) 12.5 mg, oral, 2 times daily (morning and late afternoon)    cetirizine (ZYRTEC) 10 mg, oral, Daily    cholecalciferol (VITAMIN D-3) 50 mcg, oral, Daily    fluticasone (Flonase) 50 mcg/actuation nasal spray 1 spray, Each Nostril, 2 times daily    furosemide (LASIX) 40 mg, oral, Daily    ipratropium-albuteroL (Duo-Neb) 0.5-2.5 mg/3 mL nebulizer solution 3 mL, inhalation, Every 4 hours PRN    montelukast (SINGULAIR) 10 mg, oral, Nightly    multivitamin tablet 1 tablet, oral, Daily    olmesartan (BENICAR) 40 mg, oral, Daily    polyethylene glycol (GLYCOLAX, MIRALAX) 17 g, oral, Daily    spironolactone (ALDACTONE) 25 mg, oral, Daily    Trulicity 0.75 mg, subcutaneous, Once Weekly, On Saturdays     Review of Systems   Constitutional: Negative for malaise/fatigue.   HENT: Negative.     Eyes: Negative.    Cardiovascular:  Positive for dyspnea on exertion (with humidity, stable). Negative for chest pain, leg swelling, near-syncope, palpitations (rare) and syncope.   Respiratory: Negative.     Endocrine: Negative.    Hematologic/Lymphatic: Negative.    Skin: Negative.    Musculoskeletal: Negative.    Gastrointestinal: Negative.    Genitourinary: Negative.    Neurological:  Positive for light-headedness (when glucose is high).   Psychiatric/Behavioral: Negative.     All other systems reviewed and are negative.     Physical Exam:  Constitutional:       Appearance: Healthy appearance. Not in distress.   Neck:      Vascular: No JVR. JVD normal.   Pulmonary:      Effort: Pulmonary effort is normal.      Breath sounds: Normal breath sounds. No  wheezing. No rhonchi. No rales.   Chest:      Chest wall: Not tender to palpatation.   Cardiovascular:      PMI at left midclavicular line. Normal rate. Regular rhythm. Normal S1. Normal S2.       Murmurs: There is no murmur.      No gallop.  No click. No rub.   Pulses:     Intact distal pulses.   Edema:     Peripheral edema absent.   Abdominal:      General: Bowel sounds are normal.      Palpations: Abdomen is soft.      Tenderness: There is no abdominal tenderness.   Musculoskeletal: Normal range of motion.         General: No tenderness. Skin:     General: Skin is warm and dry.   Neurological:      General: No focal deficit present.      Mental Status: Alert and oriented to person, place and time.          Last Labs:  CBC -  Lab Results   Component Value Date    WBC 10.2 07/03/2024    HGB 15.3 07/03/2024    HCT 45.8 07/03/2024     (H) 07/03/2024     07/03/2024       CMP -  Lab Results   Component Value Date    CALCIUM 9.6 07/03/2024    PROT 6.8 07/03/2024    ALBUMIN 4.5 07/03/2024    AST 14 07/03/2024    ALT 16 07/03/2024    ALKPHOS 63 07/03/2024    BILITOT 0.6 07/03/2024       LIPID PANEL -   Lab Results   Component Value Date    CHOL 114 07/03/2024    TRIG 118 07/03/2024    HDL 40.4 07/03/2024    CHHDL 2.8 07/03/2024    LDLF 137 (H) 07/20/2023    VLDL 24 07/03/2024    NHDL 74 07/03/2024       RENAL FUNCTION PANEL -   Lab Results   Component Value Date    GLUCOSE 95 07/03/2024     07/03/2024    K 4.5 07/03/2024     07/03/2024    CO2 26 07/03/2024    ANIONGAP 14 07/03/2024    BUN 21 07/03/2024    CREATININE 0.84 07/03/2024    CALCIUM 9.6 07/03/2024    ALBUMIN 4.5 07/03/2024        Lab Results   Component Value Date    BNP 51 06/15/2023    HGBA1C 6.4 (H) 07/03/2024       Last Cardiology Tests:  01/12/2023 - TTE  1. Left ventricular systolic function is normal with a 60-65% estimated ejection fraction.  2. There is moderate concentric left ventricular hypertrophy.  3. Moderately enlarged  right ventricle.  4. Mild to moderate mitral valve regurgitation.  5. Mildly elevated RVSP.     01/14/2022 to 02/12/2022 - Event Monitoring  1. Baseline transmission showing sinus rhythm with a heart rate of 62 bpm.   2. PAC burden of 1%.  3. One episode of asymptomatic PSVT lasting 17 seconds with a heart rate of 120 bpm.  4. PVC burden of 3%.  5. Two patient triggered events with reported lightheadedness or heart racing correlating with sinus rhythm and PVC or sinus rhythm and frequent PVCs in a bigeminal pattern.      12/13/2021 - TTE  The left ventricular systolic function is low normal with a 50-55% estimated ejection fraction.     10/11/2021 - Cardiac MRI  1. Mildly dilated LV size (EDVi 95 ml/m2) with moderately reduced systolic function (LVEF 33%) with global hypokinesis. Non specific paradoxical septal motion without respirophasic variations; correlate with arrhythmia/conduction abnormality.  2. Borderline concentric left ventricular hypertrophy with septal thickness 1.2 cm in diameter  3. Moderate mitral regurgitation with regurgitant volume 23 mL and regurgitant fraction of 35%  4. No evidence of myocardial fibrosis, infiltration or infarction (based on LGE).     08/26/2021 - Cardiac Catheterization (LH/RH)  No evidence of significant coronary artery disease.     08/24/2021 - TTE  1. The left ventricular systolic function is severely decreased with a 25% estimated ejection fraction.  2. Spectral Doppler shows a pseudonormal pattern of left ventricular diastolic filling.  3. There is moderate concentric left ventricular hypertrophy.  4. There is moderately reduced right ventricular systolic function.  5. The left atrium is moderate to severely dilated.  6. Moderate to severe mitral valve regurgitation.  7. Moderately elevated right ventricular systolic pressure.  8. There is mild to moderate tricuspid regurgitation.  9. There is global hypokinesis of the left ventricle with minor regional variations.      08/24/2021 - CT Abdomen and Pelvis  1. Limited examination performed without intravenous contrast. No evidence of bowel obstruction. Colonic diverticulosis without evidence of acute diverticulitis. No definite evidence of fistulous connection between the colon and the vagina on the examination. Evaluation is however limited, and if there is persistent concern for fistulous connection, GI fluoroscopic enema examination is recommended for evaluation.  2. Edema in the subcutaneous fat of the abdominal and pelvic wall and also evidence of anterior abdominal wall skin thickening; please clinically correlate with physical examination for cellulitis.  3. Bibasilar consolidative/atelectatic opacities; left lower lobe pneumonia is not excluded, and clinical correlation for infectious symptoms recommended.     08/24/2021 - CXR  Stable cardiomegaly. Bibasilar scarring or atelectasis.     07/27/2021 - CTA Chest  1. No pulmonary embolism.  2. Patchy areas of airspace consolidation in the left lower lobe persistent compared to prior recent exams. Correlate clinically for persistent pneumonia versus areas of atelectasis or scarring.  3. 1.7 cm left adrenal adenoma.     06/28/2021 - Vascular Lab PVR w/o Exercise  1. Bilateral Lower PVR: No evidence of arterial occlusive disease bilaterally in the lower extremities at rest. Triphasic flow is noted in the common femoral arteries, posterior tibial arteries and dorsalis pedis arteries.  2. Right Lower PVR: Normal digital perfusion noted.  3. Left Lower PVR: Normal digital perfusion noted.     06/28/2021- Vascular Lab Venous Insufficiency/Reflux U/S  1. Right Lower Venous Insufficiency: Right leg demonstrates no evidence of deep vein thrombosis or deep or superficial venous insufficiency.  2. Left Lower Venous Insufficiency: Left leg demonstrates no evidence of deep vein thrombosis or deep or superficial venous insufficiency.  3. Additional Findings: Pulsatile flow is noted  throughout. The SSV arises proximal to the popliteal bilaterally([employer]).     05/17/2021 - CXR  Left lower lobe pneumonia which has improved as compared to the earlier study.     05/174/2021 - U/S Duplex Lower Extremity Veins, Right, Unilateral  1. No evidence for DVT within the right lower extremity.  2. Right Mcfadden's cyst.     05/03/2021 - CXR  Left lower lobe consolidation suspicious for pneumonia. Follow-up is advised.     06/01/2018 - Holter Monitor (23 hr 59 min)  1. The basic rhythm is sinus with rates between 52 - 117 bpm. Sinus bradyarrhythmia was noted. The longest R-R interval was 1.6 seconds. The longest N-N interval was 1.3 seconds.   2. Supraventricular ectopic activity consisted of 34 beats, of which 5 were in 1 run of atrial tachycardia, 29 were single PACs. The longest supraventricular run consisted of 5 beats, with maximum heart rate of 109 bpm. The fastest supraventricular run consisted of 5 beats, with a maximum heart rate of 109 bpm.   3. Multifocal ventricular ectopic activity consisted of 758 beats, of which 10 were in couplets, 748 were single PVCs, including clusters.      06/01/2018 - Exercise Stress Echocardiogram  1. Normal exercise stress echocardiogram.  2. 92% max predicted heart rate in 9-10 METS.  3. Blood pressure response is hypertensive diastolic blood pressure.  4. No significant EKG changes.  5. Test ended due to patient's fatigue.  6. Stress provoked no symptoms.     Lab review: I have personally reviewed the laboratory result(s).    Assessment/Plan   Nguyen Ponce is a 58 year old female patient who presents today for 3-month followup of heart failure, palpitations and HTN. Her PMH is significant for HTN, anxiety, COPD, depression, nicotine dependence, obesity, PVD, palpitations, chronic systolic heart failure, and h/o pneumonia. She presents today for 3 month follow up.     1) Chronic Systolic Heart Failure - EF improved  TTE 01/12/2023 with LVEF 60-65%, moderate  concentric LVH, mild to moderate MR, mildly elevated RVSP  TTE Dec 2021 with LVEF 50-55%  [facility] Aug 2021 with no significant CAD  Today, patient is compensated and euvolemic on exam  Recent labs reviewed, kidney function normal, potassium normal  Continue on carvedilol 12.5 mg BID, olmesartan 40 mg daily, furosemide 40 mg daily, spironolactone 25 mg daily  Discussed reducing dose of furosemide to 20 mg today with patient with concern for nocturia, dry mouth. She is hesitant to reduce because she states she has been stable and does not want to develop shortness of breath.  She will consider and continue to watch for edema, shortness of breath and weight gain.     2) Palpitations  Event Monitor Jan/Feb 2022 with one episode of short SVT  Today, denies any recent palpitations. Stable  Continue on carvedilol 12.5 mg BID   Patient counselled about avoiding caffeine     3) HYPERTENSION  Today's BP is well controlled  Continue on carvedilol 12.5 mg BID, olmesartan 40 mg daily, furosemide 40 mg daily, spironolactone 25 mg daily       4) Sleep Disturbance, BLE edema  Wakes several times throughout the night to urinate  Discussed reducing furosemide dose, she will consider  Per patient, sleep study in 2015 was negative      History of nicotine dependence  Patient has not smoked in over 2 years. Congratulated on effort.

## 2024-08-06 ENCOUNTER — APPOINTMENT (OUTPATIENT)
Dept: CARDIOLOGY | Facility: HOSPITAL | Age: 59
End: 2024-08-06
Payer: COMMERCIAL

## 2024-08-06 VITALS
HEART RATE: 51 BPM | SYSTOLIC BLOOD PRESSURE: 100 MMHG | OXYGEN SATURATION: 97 % | WEIGHT: 203 LBS | HEIGHT: 65 IN | BODY MASS INDEX: 33.82 KG/M2 | DIASTOLIC BLOOD PRESSURE: 70 MMHG

## 2024-08-06 DIAGNOSIS — I50.42 CHRONIC COMBINED SYSTOLIC AND DIASTOLIC CHF, NYHA CLASS 3 (MULTI): ICD-10-CM

## 2024-08-06 DIAGNOSIS — Z86.79 HISTORY OF PAROXYSMAL SUPRAVENTRICULAR TACHYCARDIA: ICD-10-CM

## 2024-08-06 DIAGNOSIS — E78.2 MIXED HYPERLIPIDEMIA: ICD-10-CM

## 2024-08-06 DIAGNOSIS — I42.9 CARDIOMYOPATHY, UNSPECIFIED TYPE (MULTI): ICD-10-CM

## 2024-08-06 DIAGNOSIS — R00.2 PALPITATIONS: ICD-10-CM

## 2024-08-06 DIAGNOSIS — K52.9 COLITIS: ICD-10-CM

## 2024-08-06 DIAGNOSIS — I50.22 SYSTOLIC HEART FAILURE, CHRONIC (MULTI): ICD-10-CM

## 2024-08-06 DIAGNOSIS — I10 BENIGN ESSENTIAL HYPERTENSION: Primary | ICD-10-CM

## 2024-08-06 PROCEDURE — 4010F ACE/ARB THERAPY RXD/TAKEN: CPT | Performed by: CLINICAL NURSE SPECIALIST

## 2024-08-06 PROCEDURE — 3044F HG A1C LEVEL LT 7.0%: CPT | Performed by: CLINICAL NURSE SPECIALIST

## 2024-08-06 PROCEDURE — 99214 OFFICE O/P EST MOD 30 MIN: CPT | Performed by: CLINICAL NURSE SPECIALIST

## 2024-08-06 PROCEDURE — 3078F DIAST BP <80 MM HG: CPT | Performed by: CLINICAL NURSE SPECIALIST

## 2024-08-06 PROCEDURE — 3008F BODY MASS INDEX DOCD: CPT | Performed by: CLINICAL NURSE SPECIALIST

## 2024-08-06 PROCEDURE — 3074F SYST BP LT 130 MM HG: CPT | Performed by: CLINICAL NURSE SPECIALIST

## 2024-08-06 PROCEDURE — 3048F LDL-C <100 MG/DL: CPT | Performed by: CLINICAL NURSE SPECIALIST

## 2024-08-06 PROCEDURE — 1036F TOBACCO NON-USER: CPT | Performed by: CLINICAL NURSE SPECIALIST

## 2024-08-06 RX ORDER — ATORVASTATIN CALCIUM 40 MG/1
40 TABLET, FILM COATED ORAL DAILY
Qty: 90 TABLET | Refills: 3 | Status: SHIPPED | OUTPATIENT
Start: 2024-08-06 | End: 2025-08-06

## 2024-08-06 RX ORDER — OLMESARTAN MEDOXOMIL 40 MG/1
40 TABLET ORAL DAILY
Qty: 90 TABLET | Refills: 3 | Status: SHIPPED | OUTPATIENT
Start: 2024-08-06 | End: 2025-08-06

## 2024-08-06 RX ORDER — FUROSEMIDE 40 MG/1
40 TABLET ORAL DAILY
Qty: 90 TABLET | Refills: 3 | Status: SHIPPED | OUTPATIENT
Start: 2024-08-06 | End: 2025-08-06

## 2024-08-06 RX ORDER — SPIRONOLACTONE 25 MG/1
25 TABLET ORAL DAILY
Qty: 90 TABLET | Refills: 3 | Status: SHIPPED | OUTPATIENT
Start: 2024-08-06 | End: 2025-08-06

## 2024-08-06 ASSESSMENT — ENCOUNTER SYMPTOMS
LIGHT-HEADEDNESS: 1
PSYCHIATRIC NEGATIVE: 1
DYSPNEA ON EXERTION: 1
HEMATOLOGIC/LYMPHATIC NEGATIVE: 1
MUSCULOSKELETAL NEGATIVE: 1
NEAR-SYNCOPE: 0
PALPITATIONS: 0
EYES NEGATIVE: 1
GASTROINTESTINAL NEGATIVE: 1
RESPIRATORY NEGATIVE: 1
SYNCOPE: 0
ENDOCRINE NEGATIVE: 1

## 2024-08-06 NOTE — PATIENT INSTRUCTIONS
Please make sure you are completing labs in October as scheduled  We discussed cutting your furosemide back to 20 mg daily. Continue to monitor,  if you would like to make this change, please weigh yourself daily and monitor your symptoms. Go back to 40 mg daily if you experience weight gain of 2-3 pounds, worsening lower extremity edema or worsening shortness of breath.    Continue to monitor your blood pressure and heart rate at home.  Call our office with any new cardiac concerns  Continue current medications  Continue heart-healthy diet. A diet low in sodium, low in cholesterol, limiting red meats and eating whole foods.   Follow up with Dr. Mari in 6 months

## 2024-08-08 ENCOUNTER — APPOINTMENT (OUTPATIENT)
Dept: CARDIOLOGY | Facility: CLINIC | Age: 59
End: 2024-08-08
Payer: COMMERCIAL

## 2024-08-13 ENCOUNTER — APPOINTMENT (OUTPATIENT)
Dept: CARDIOLOGY | Facility: HOSPITAL | Age: 59
End: 2024-08-13
Payer: COMMERCIAL

## 2024-08-13 ENCOUNTER — APPOINTMENT (OUTPATIENT)
Dept: RADIOLOGY | Facility: HOSPITAL | Age: 59
End: 2024-08-13
Payer: COMMERCIAL

## 2024-08-13 ENCOUNTER — HOSPITAL ENCOUNTER (EMERGENCY)
Facility: HOSPITAL | Age: 59
Discharge: HOME | End: 2024-08-13
Payer: COMMERCIAL

## 2024-08-13 ENCOUNTER — TELEPHONE (OUTPATIENT)
Dept: CARDIOLOGY | Facility: HOSPITAL | Age: 59
End: 2024-08-13
Payer: COMMERCIAL

## 2024-08-13 VITALS
OXYGEN SATURATION: 97 % | HEART RATE: 66 BPM | SYSTOLIC BLOOD PRESSURE: 133 MMHG | RESPIRATION RATE: 18 BRPM | TEMPERATURE: 97.9 F | BODY MASS INDEX: 33.82 KG/M2 | HEIGHT: 65 IN | WEIGHT: 203 LBS | DIASTOLIC BLOOD PRESSURE: 87 MMHG

## 2024-08-13 DIAGNOSIS — R42 LIGHTHEADEDNESS: Primary | ICD-10-CM

## 2024-08-13 LAB
ALBUMIN SERPL BCP-MCNC: 4.6 G/DL (ref 3.4–5)
ALP SERPL-CCNC: 63 U/L (ref 33–110)
ALT SERPL W P-5'-P-CCNC: 23 U/L (ref 7–45)
ANION GAP SERPL CALC-SCNC: 12 MMOL/L (ref 10–20)
APPEARANCE UR: CLEAR
APTT PPP: 26 SECONDS (ref 27–38)
AST SERPL W P-5'-P-CCNC: 18 U/L (ref 9–39)
BASOPHILS # BLD AUTO: 0.08 X10*3/UL (ref 0–0.1)
BASOPHILS NFR BLD AUTO: 0.7 %
BILIRUB SERPL-MCNC: 0.6 MG/DL (ref 0–1.2)
BILIRUB UR STRIP.AUTO-MCNC: NEGATIVE MG/DL
BNP SERPL-MCNC: 24 PG/ML (ref 0–99)
BUN SERPL-MCNC: 28 MG/DL (ref 6–23)
CALCIUM SERPL-MCNC: 9.4 MG/DL (ref 8.6–10.3)
CARDIAC TROPONIN I PNL SERPL HS: 7 NG/L (ref 0–13)
CARDIAC TROPONIN I PNL SERPL HS: 7 NG/L (ref 0–13)
CHLORIDE SERPL-SCNC: 98 MMOL/L (ref 98–107)
CO2 SERPL-SCNC: 25 MMOL/L (ref 21–32)
COLOR UR: COLORLESS
CREAT SERPL-MCNC: 0.93 MG/DL (ref 0.5–1.05)
EGFRCR SERPLBLD CKD-EPI 2021: 72 ML/MIN/1.73M*2
EOSINOPHIL # BLD AUTO: 0.29 X10*3/UL (ref 0–0.7)
EOSINOPHIL NFR BLD AUTO: 2.5 %
ERYTHROCYTE [DISTWIDTH] IN BLOOD BY AUTOMATED COUNT: 12.5 % (ref 11.5–14.5)
GLUCOSE SERPL-MCNC: 93 MG/DL (ref 74–99)
GLUCOSE UR STRIP.AUTO-MCNC: NORMAL MG/DL
HCT VFR BLD AUTO: 44.5 % (ref 36–46)
HGB BLD-MCNC: 15.2 G/DL (ref 12–16)
HOLD SPECIMEN: NORMAL
IMM GRANULOCYTES # BLD AUTO: 0.06 X10*3/UL (ref 0–0.7)
IMM GRANULOCYTES NFR BLD AUTO: 0.5 % (ref 0–0.9)
INR PPP: 0.9 (ref 0.9–1.1)
KETONES UR STRIP.AUTO-MCNC: NEGATIVE MG/DL
LEUKOCYTE ESTERASE UR QL STRIP.AUTO: NEGATIVE
LYMPHOCYTES # BLD AUTO: 3.7 X10*3/UL (ref 1.2–4.8)
LYMPHOCYTES NFR BLD AUTO: 31.9 %
MAGNESIUM SERPL-MCNC: 1.81 MG/DL (ref 1.6–2.4)
MCH RBC QN AUTO: 34.9 PG (ref 26–34)
MCHC RBC AUTO-ENTMCNC: 34.2 G/DL (ref 32–36)
MCV RBC AUTO: 102 FL (ref 80–100)
MONOCYTES # BLD AUTO: 0.72 X10*3/UL (ref 0.1–1)
MONOCYTES NFR BLD AUTO: 6.2 %
NEUTROPHILS # BLD AUTO: 6.75 X10*3/UL (ref 1.2–7.7)
NEUTROPHILS NFR BLD AUTO: 58.2 %
NITRITE UR QL STRIP.AUTO: NEGATIVE
NRBC BLD-RTO: 0 /100 WBCS (ref 0–0)
PH UR STRIP.AUTO: 6 [PH]
PLATELET # BLD AUTO: 304 X10*3/UL (ref 150–450)
POTASSIUM SERPL-SCNC: 4.3 MMOL/L (ref 3.5–5.3)
PROT SERPL-MCNC: 7.5 G/DL (ref 6.4–8.2)
PROT UR STRIP.AUTO-MCNC: NEGATIVE MG/DL
PROTHROMBIN TIME: 10.4 SECONDS (ref 9.8–12.8)
RBC # BLD AUTO: 4.35 X10*6/UL (ref 4–5.2)
RBC # UR STRIP.AUTO: NEGATIVE /UL
SODIUM SERPL-SCNC: 131 MMOL/L (ref 136–145)
SP GR UR STRIP.AUTO: 1.01
UROBILINOGEN UR STRIP.AUTO-MCNC: NORMAL MG/DL
WBC # BLD AUTO: 11.6 X10*3/UL (ref 4.4–11.3)

## 2024-08-13 PROCEDURE — 70450 CT HEAD/BRAIN W/O DYE: CPT | Performed by: STUDENT IN AN ORGANIZED HEALTH CARE EDUCATION/TRAINING PROGRAM

## 2024-08-13 PROCEDURE — 36415 COLL VENOUS BLD VENIPUNCTURE: CPT | Performed by: PHYSICIAN ASSISTANT

## 2024-08-13 PROCEDURE — 84484 ASSAY OF TROPONIN QUANT: CPT | Performed by: PHYSICIAN ASSISTANT

## 2024-08-13 PROCEDURE — 85730 THROMBOPLASTIN TIME PARTIAL: CPT | Performed by: PHYSICIAN ASSISTANT

## 2024-08-13 PROCEDURE — 93005 ELECTROCARDIOGRAM TRACING: CPT

## 2024-08-13 PROCEDURE — 85610 PROTHROMBIN TIME: CPT | Performed by: PHYSICIAN ASSISTANT

## 2024-08-13 PROCEDURE — 71046 X-RAY EXAM CHEST 2 VIEWS: CPT | Performed by: STUDENT IN AN ORGANIZED HEALTH CARE EDUCATION/TRAINING PROGRAM

## 2024-08-13 PROCEDURE — 83880 ASSAY OF NATRIURETIC PEPTIDE: CPT | Performed by: PHYSICIAN ASSISTANT

## 2024-08-13 PROCEDURE — 84075 ASSAY ALKALINE PHOSPHATASE: CPT | Performed by: PHYSICIAN ASSISTANT

## 2024-08-13 PROCEDURE — 83735 ASSAY OF MAGNESIUM: CPT | Performed by: PHYSICIAN ASSISTANT

## 2024-08-13 PROCEDURE — 2500000004 HC RX 250 GENERAL PHARMACY W/ HCPCS (ALT 636 FOR OP/ED): Performed by: PHYSICIAN ASSISTANT

## 2024-08-13 PROCEDURE — 71046 X-RAY EXAM CHEST 2 VIEWS: CPT

## 2024-08-13 PROCEDURE — 70450 CT HEAD/BRAIN W/O DYE: CPT

## 2024-08-13 PROCEDURE — 85025 COMPLETE CBC W/AUTO DIFF WBC: CPT | Performed by: PHYSICIAN ASSISTANT

## 2024-08-13 PROCEDURE — 99285 EMERGENCY DEPT VISIT HI MDM: CPT | Mod: 25

## 2024-08-13 PROCEDURE — 81003 URINALYSIS AUTO W/O SCOPE: CPT | Performed by: PHYSICIAN ASSISTANT

## 2024-08-13 ASSESSMENT — COLUMBIA-SUICIDE SEVERITY RATING SCALE - C-SSRS
6. HAVE YOU EVER DONE ANYTHING, STARTED TO DO ANYTHING, OR PREPARED TO DO ANYTHING TO END YOUR LIFE?: NO
1. IN THE PAST MONTH, HAVE YOU WISHED YOU WERE DEAD OR WISHED YOU COULD GO TO SLEEP AND NOT WAKE UP?: NO
2. HAVE YOU ACTUALLY HAD ANY THOUGHTS OF KILLING YOURSELF?: NO

## 2024-08-13 ASSESSMENT — LIFESTYLE VARIABLES
EVER HAD A DRINK FIRST THING IN THE MORNING TO STEADY YOUR NERVES TO GET RID OF A HANGOVER: NO
HAVE YOU EVER FELT YOU SHOULD CUT DOWN ON YOUR DRINKING: NO
HAVE PEOPLE ANNOYED YOU BY CRITICIZING YOUR DRINKING: NO
EVER FELT BAD OR GUILTY ABOUT YOUR DRINKING: NO
TOTAL SCORE: 0

## 2024-08-13 ASSESSMENT — PAIN SCALES - GENERAL: PAINLEVEL_OUTOF10: 0 - NO PAIN

## 2024-08-13 ASSESSMENT — PAIN - FUNCTIONAL ASSESSMENT: PAIN_FUNCTIONAL_ASSESSMENT: 0-10

## 2024-08-13 NOTE — TELEPHONE ENCOUNTER
She called to discuss symptoms she is having for a few days now.  She had severe pain in her back between her shoulder blades, arm and jaw pain a couple days ago.  Since then she has been having increased shortness of breath and has been getting low readings on her pulse ox - 88%.  She has also noted high heart rates int he 90s which is abnormal for her.  Also she had blood pressure readings as low as 75/55.  She is not feeling well.  Advised her to be evaluated in ER.  She correctly repeated instructions, verbalized understanding and is agreeable to the plan.

## 2024-08-13 NOTE — ED PROVIDER NOTES
EMERGENCY MEDICINE EVALUATION NOTE    History of Present Illness     Chief Complaint:   Chief Complaint   Patient presents with    lightheaded/ period of confusion sunday       HPI: Nguyen Ponce is a 58 y.o. female presents with a chief complaint of lightheadedness and confusion.  Patient reports that this has been going on since her visit to her cardiologist last week.  She reports that at that visit she was noted to have a low heart rate.  Upon review of chart it appears her heart rate was 51 at that visit.  She states that she is also currently taking 40 mg of Lasix every day due to history of CHF.  She reports that her blood pressure has been running low with a pressure of 75 systolically at home so she stopped taking her entire Lasix.  She reports that 2 days after the visit she took 20 mg and she has not taken any Lasix since Thursday making is the fourth day without them.  She states that she persistently has episodes of lightheadedness.  She states that it is worse when she goes from sitting to standing position.  She also reports pain along the left side of her jaw as well as into her neck and left arm occasionally.  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  Patient denies any current chest pain.  Patient denies any unilateral weakness.  She states that she has no difficulty with speech but she has become very forgetful over the last few days.  She denies any history of any CVA.    Previous History     Past Medical History:   Diagnosis Date    Colon polyp     COPD (chronic obstructive pulmonary disease) (Multi)     Ischemic colitis (Multi) 11/25/2023    Localized edema 09/14/2021    Edema leg    Lymphedema, not elsewhere classified 06/01/2021    Lymphedema of both lower extremities    Morbid (severe) obesity due to excess calories (Multi) 01/12/2022    Class 2 severe obesity with serious comorbidity and body mass index (BMI) of 36.0 to  36.9 in adult, unspecified obesity type    Obesity     Obesity, unspecified 2022    Class 2 obesity with body mass index (BMI) of 36.0 to 36.9 in adult    Obesity, unspecified 2022    Class 2 obesity with body mass index (BMI) of 37.0 to 37.9 in adult    Pain in left arm 2018    Left arm pain    Pain in right arm     Pain of right upper extremity    Personal history of other diseases of the circulatory system     History of PSVT (paroxysmal supraventricular tachycardia)    Personal history of other diseases of the circulatory system 2018    History of congestive heart failure    Personal history of other endocrine, nutritional and metabolic disease     History of hypoglycemia    Personal history of other endocrine, nutritional and metabolic disease 2022    History of uncontrolled diabetes    Personal history of other endocrine, nutritional and metabolic disease     History of hypokalemia    Personal history of other infectious and parasitic diseases 2022    History of dermatophytosis    Personal history of peptic ulcer disease     History of gastric ulcer    Personal history of urinary calculi     History of kidney stones    Prediabetes 2022    Prediabetes     Past Surgical History:   Procedure Laterality Date    CARDIAC CATHETERIZATION  2021    CHOLECYSTECTOMY  2018    Cholecystectomy    GASTRIC FUNDOPLICATION      OTHER SURGICAL HISTORY  2020    Anterior cervical vertebral fusion    TONSILLECTOMY  2018    Tonsillectomy     Social History     Tobacco Use    Smoking status: Former     Current packs/day: 0.00     Types: Cigarettes     Quit date: 2007     Years since quittin.6    Smokeless tobacco: Never   Vaping Use    Vaping status: Never Used   Substance Use Topics    Alcohol use: Not Currently     Comment: sometimes    Drug use: Never     Family History   Problem Relation Name Age of Onset    Hypertension Mother Ree Marion     Tan  (palpitations) Mother Ree Marion     Other (palpitations) Sister      Other (stomach issues) Sister      Diabetes Daughter      Brain Aneurysm Mother's Sister      Brain Aneurysm Paternal Grandmother      Brain Aneurysm Paternal Grandfather       No Known Allergies  Current Outpatient Medications   Medication Instructions    albuterol 90 mcg/actuation inhaler 2 puffs, inhalation, Every 4 hours PRN    aspirin 81 mg, oral, Daily    atorvastatin (LIPITOR) 40 mg, oral, Daily    azelastine (Astelin) 137 mcg (0.1 %) nasal spray 1 spray, Each Nostril, 2 times daily, Use in each nostril as directed    budesonide-glycopyr-formoterol (Breztri Aerosphere) 160-9-4.8 mcg/actuation HFA aerosol inhaler 2 puffs, inhalation, 2 times daily RT    carvedilol (COREG) 12.5 mg, oral, 2 times daily (morning and late afternoon)    cetirizine (ZYRTEC) 10 mg, oral, Daily    cholecalciferol (VITAMIN D-3) 50 mcg, oral, Daily    fluticasone (Flonase) 50 mcg/actuation nasal spray 1 spray, Each Nostril, 2 times daily    furosemide (LASIX) 40 mg, oral, Daily    ipratropium-albuteroL (Duo-Neb) 0.5-2.5 mg/3 mL nebulizer solution 3 mL, inhalation, Every 4 hours PRN    montelukast (SINGULAIR) 10 mg, oral, Nightly    multivitamin tablet 1 tablet, oral, Daily    olmesartan (BENICAR) 40 mg, oral, Daily    polyethylene glycol (GLYCOLAX, MIRALAX) 17 g, oral, Daily    spironolactone (ALDACTONE) 25 mg, oral, Daily    Trulicity 0.75 mg, subcutaneous, Once Weekly, On Saturdays       Physical Exam     Appearance: Alert, oriented , cooperative,  in no acute distress.      Skin: Intact,  dry skin, no lesions, rash, petechiae or purpura.      Eyes: PERRLA, EOMs intact,  Conjunctiva pink with no redness or exudates.      ENT: Hearing grossly intact. Pharynx clear     Neck: Supple. Trachea at midline.      Pulmonary: Clear bilaterally. No rales, rhonchi or wheezing. No accessory muscle use or stridor.     Cardiac: Normal rate and rhythm without murmur     Abdomen:  Soft, nontender, active bowel sounds.     Musculoskeletal: Full range of motion.      Neurological:Cranial nerves II through XII are grossly intact, normal sensation, no weakness, no focal findings identified.  No nystagmus noted on examination.     Results     Labs Reviewed   CBC WITH AUTO DIFFERENTIAL - Abnormal       Result Value    WBC 11.6 (*)     nRBC 0.0      RBC 4.35      Hemoglobin 15.2      Hematocrit 44.5       (*)     MCH 34.9 (*)     MCHC 34.2      RDW 12.5      Platelets 304      Neutrophils % 58.2      Immature Granulocytes %, Automated 0.5      Lymphocytes % 31.9      Monocytes % 6.2      Eosinophils % 2.5      Basophils % 0.7      Neutrophils Absolute 6.75      Immature Granulocytes Absolute, Automated 0.06      Lymphocytes Absolute 3.70      Monocytes Absolute 0.72      Eosinophils Absolute 0.29      Basophils Absolute 0.08     COMPREHENSIVE METABOLIC PANEL - Abnormal    Glucose 93      Sodium 131 (*)     Potassium 4.3      Chloride 98      Bicarbonate 25      Anion Gap 12      Urea Nitrogen 28 (*)     Creatinine 0.93      eGFR 72      Calcium 9.4      Albumin 4.6      Alkaline Phosphatase 63      Total Protein 7.5      AST 18      Bilirubin, Total 0.6      ALT 23     APTT - Abnormal    aPTT 26 (*)     Narrative:     The APTT is no longer used for monitoring Unfractionated Heparin Therapy. For monitoring Heparin Therapy, use the Heparin Assay.   URINALYSIS WITH REFLEX CULTURE AND MICROSCOPIC - Abnormal    Color, Urine Colorless (*)     Appearance, Urine Clear      Specific Gravity, Urine 1.008      pH, Urine 6.0      Protein, Urine NEGATIVE      Glucose, Urine Normal      Blood, Urine NEGATIVE      Ketones, Urine NEGATIVE      Bilirubin, Urine NEGATIVE      Urobilinogen, Urine Normal      Nitrite, Urine NEGATIVE      Leukocyte Esterase, Urine NEGATIVE     MAGNESIUM - Normal    Magnesium 1.81     PROTIME-INR - Normal    Protime 10.4      INR 0.9     B-TYPE NATRIURETIC PEPTIDE - Normal    BNP  24      Narrative:        <100 pg/mL - Heart failure unlikely  100-299 pg/mL - Intermediate probability of acute heart                  failure exacerbation. Correlate with clinical                  context and patient history.    >=300 pg/mL - Heart Failure likely. Correlate with clinical                  context and patient history.    BNP testing is performed using different testing methodology at [facility] than at other [facility]. Direct result comparisons should only be made within the same method.      SERIAL TROPONIN-INITIAL - Normal    Troponin I, High Sensitivity 7      Narrative:     Less than 99th percentile of normal range cutoff-  Female and children under 18 years old <14 ng/L; Male <21 ng/L: Negative  Repeat testing should be performed if clinically indicated.     Female and children under 18 years old 14-50 ng/L; Male 21-50 ng/L:  Consistent with possible cardiac damage and possible increased clinical   risk. Serial measurements may help to assess extent of myocardial damage.     >50 ng/L: Consistent with cardiac damage, increased clinical risk and  myocardial infarction. Serial measurements may help assess extent of   myocardial damage.      NOTE: Children less than 1 year old may have higher baseline troponin   levels and results should be interpreted in conjunction with the overall   clinical context.     NOTE: Troponin I testing is performed using a different   testing methodology at [facility] than at [facility]. Direct result comparisons should only   be made within the same method.   SERIAL TROPONIN, 1 HOUR - Normal    Troponin I, High Sensitivity 7      Narrative:     Less than 99th percentile of normal range cutoff-  Female and children under 18 years old <14 ng/L; Male <21 ng/L: Negative  Repeat testing should be performed if clinically indicated.     Female and children under 18 years old 14-50 ng/L; Male 21-50 ng/L:  Consistent with  possible cardiac damage and possible increased clinical   risk. Serial measurements may help to assess extent of myocardial damage.     >50 ng/L: Consistent with cardiac damage, increased clinical risk and  myocardial infarction. Serial measurements may help assess extent of   myocardial damage.      NOTE: Children less than 1 year old may have higher baseline troponin   levels and results should be interpreted in conjunction with the overall   clinical context.     NOTE: Troponin I testing is performed using a different   testing methodology at [facility] than at other   [facility]. Direct result comparisons should only   be made within the same method.   TROPONIN SERIES- (INITIAL, 1 HR)    Narrative:     The following orders were created for panel order Troponin I Series, High Sensitivity (0, 1 HR).  Procedure                               Abnormality         Status                     ---------                               -----------         ------                     Troponin I, High Sensiti...[[id number]]  Normal              Final result               Troponin, High Sensitivi...[[id number]]  Normal              Final result                 Please view results for these tests on the individual orders.   URINALYSIS WITH REFLEX CULTURE AND MICROSCOPIC    Narrative:     The following orders were created for panel order Urinalysis with Reflex Culture and Microscopic.  Procedure                               Abnormality         Status                     ---------                               -----------         ------                     Urinalysis with Reflex C...[[id number]]  Abnormal            Final result               Extra Urine Gray Tube[[id number]]                            In process                   Please view results for these tests on the individual orders.   EXTRA URINE GRAY TUBE     CT head wo IV contrast   Final Result   No acute intracranial abnormality.             Signed by:  "Asad Mcfarlane 8/13/2024 12:04 PM   Dictation workstation:   [id number]      XR chest 2 views   Final Result   1.  No evidence of acute cardiopulmonary process.                  MACRO:   None        Signed by: Asad Mcfarlane 8/13/2024 12:05 PM   Dictation workstation:   [id number]            ED Course & Medical Decision Making     Medications   sodium chloride 0.9 % bolus 500 mL (0 mL intravenous Stopped 8/13/24 1150)     Heart Rate:  [63-70]   Temperature:  [36.6 °C (97.9 °F)]   Respirations:  [18]   BP: (126-140)/(74-90)   Height:  [165.1 cm (5' 5\")]   Weight:  [92.1 kg (203 lb)]   Pulse Ox:  [97 %-100 %]    ED Course as of 08/13/24 1218   Tue Aug 13, 2024   1123 Spoke to Dr. Mari.  He request that we give the patient 500 cc of fluid.  He states that this improves her symptoms the patient can be discharged pending the remainder of her workup is negative.  If this is not of her symptoms are there any other abnormalities patient needs to go to the hospital for further evaluation.  Discussed this plan of care with the patient. [CJ]   1132 Discussed this plan of care with the patient and family member.  Patient states that she \"already feels better\" if it means she can go home.  I discussed with her that we need to give her the fluids and then reassess her.  I informed her we are still waiting on her CT head and chest x-ray to make disposition.  Patient will be reassessed after the results are back. [CJ]   1210 Reevaluated the patient at this time.  Patient states she feels improved.  She states that she did have a slight amount of dizziness when she laid completely flat for CT and then sat up quickly.  She reports that right now though she feels back to baseline.  Plan of care going forward.  Once again we discussed admission versus discharge.  Patient and family are in agreement with the plan of care of discharge at this time.  She was encouraged to call Dr. Mari's office when she leaves the hospital to set up a " follow-up appointment to do any medication adjustments.  Patient encouraged return here immediately with any worsening symptoms.  Patient also encouraged to follow-up with primary care provider in 1 to 2 days. [CJ]      ED Course User Index  [CJ] Ozzy Garcia PA-C         Diagnoses as of 08/13/24 1218   Lightheadedness       Procedures   ECG 12 lead    Performed by: Ozzy Garcia PA-C  Authorized by: Ozzy Garcia PA-C    ECG interpreted by ED Physician in the absence of a cardiologist: yes    Rate:     ECG rate:  68  Rhythm:     Rhythm: sinus rhythm    ST segments:     ST segments:  Normal  T waves:     T waves: normal    Comments:      No STEMI      Diagnosis     1. Lightheadedness        Disposition   Discharge    ED Prescriptions    None         Disclaimer: This note was dictated by speech recognition. Minor errors in transcription may be present. Please call if questions.       Ozzy Garcia PA-C  08/13/24 1217

## 2024-08-14 LAB
ATRIAL RATE: 69 BPM
P AXIS: 46 DEGREES
PR INTERVAL: 193 MS
Q ONSET: 249 MS
QRS COUNT: 11 BEATS
QRS DURATION: 109 MS
QT INTERVAL: 414 MS
QTC CALCULATION(BAZETT): 441 MS
QTC FREDERICIA: 431 MS
R AXIS: -13 DEGREES
T AXIS: 51 DEGREES
T OFFSET: 456 MS
VENTRICULAR RATE: 68 BPM

## 2024-10-07 ENCOUNTER — APPOINTMENT (OUTPATIENT)
Dept: PRIMARY CARE | Facility: CLINIC | Age: 59
End: 2024-10-07
Payer: COMMERCIAL

## 2024-10-15 ENCOUNTER — APPOINTMENT (OUTPATIENT)
Dept: PRIMARY CARE | Facility: CLINIC | Age: 59
End: 2024-10-15
Payer: COMMERCIAL

## 2024-10-23 ENCOUNTER — APPOINTMENT (OUTPATIENT)
Dept: PRIMARY CARE | Facility: CLINIC | Age: 59
End: 2024-10-23
Payer: COMMERCIAL

## 2024-10-23 VITALS
BODY MASS INDEX: 33.99 KG/M2 | DIASTOLIC BLOOD PRESSURE: 70 MMHG | SYSTOLIC BLOOD PRESSURE: 110 MMHG | HEIGHT: 65 IN | HEART RATE: 78 BPM | WEIGHT: 204 LBS

## 2024-10-23 DIAGNOSIS — E11.65 TYPE 2 DIABETES MELLITUS WITH HYPERGLYCEMIA, WITHOUT LONG-TERM CURRENT USE OF INSULIN: ICD-10-CM

## 2024-10-23 DIAGNOSIS — E78.2 MIXED HYPERLIPIDEMIA: ICD-10-CM

## 2024-10-23 DIAGNOSIS — E11.9 TYPE 2 DIABETES MELLITUS WITHOUT COMPLICATION, WITHOUT LONG-TERM CURRENT USE OF INSULIN (MULTI): ICD-10-CM

## 2024-10-23 DIAGNOSIS — E55.9 VITAMIN D DEFICIENCY: Primary | ICD-10-CM

## 2024-10-23 DIAGNOSIS — I50.22 SYSTOLIC HEART FAILURE, CHRONIC: ICD-10-CM

## 2024-10-23 PROCEDURE — 1036F TOBACCO NON-USER: CPT | Performed by: CLINICAL NURSE SPECIALIST

## 2024-10-23 PROCEDURE — 4010F ACE/ARB THERAPY RXD/TAKEN: CPT | Performed by: CLINICAL NURSE SPECIALIST

## 2024-10-23 PROCEDURE — 99214 OFFICE O/P EST MOD 30 MIN: CPT | Performed by: CLINICAL NURSE SPECIALIST

## 2024-10-23 PROCEDURE — 3048F LDL-C <100 MG/DL: CPT | Performed by: CLINICAL NURSE SPECIALIST

## 2024-10-23 PROCEDURE — 3044F HG A1C LEVEL LT 7.0%: CPT | Performed by: CLINICAL NURSE SPECIALIST

## 2024-10-23 PROCEDURE — 3074F SYST BP LT 130 MM HG: CPT | Performed by: CLINICAL NURSE SPECIALIST

## 2024-10-23 PROCEDURE — 3008F BODY MASS INDEX DOCD: CPT | Performed by: CLINICAL NURSE SPECIALIST

## 2024-10-23 PROCEDURE — 3078F DIAST BP <80 MM HG: CPT | Performed by: CLINICAL NURSE SPECIALIST

## 2024-10-23 ASSESSMENT — ENCOUNTER SYMPTOMS
UNEXPECTED WEIGHT CHANGE: 0
PALPITATIONS: 0
FEVER: 0
DIARRHEA: 0
POLYDIPSIA: 0
BLOOD IN STOOL: 0
MYALGIAS: 0
SEIZURES: 0
WOUND: 0
ABDOMINAL PAIN: 0
FATIGUE: 0
SLEEP DISTURBANCE: 0
VOMITING: 0
DYSURIA: 0
BACK PAIN: 0
BRUISES/BLEEDS EASILY: 0
DEPRESSION: 0
CONSTIPATION: 0
HEADACHES: 0
OCCASIONAL FEELINGS OF UNSTEADINESS: 0
HEMATURIA: 0
LOSS OF SENSATION IN FEET: 0
EYE PAIN: 0
SORE THROAT: 0
COUGH: 0
ACTIVITY CHANGE: 0
NAUSEA: 0
ARTHRALGIAS: 0
WHEEZING: 0
CHILLS: 0
TROUBLE SWALLOWING: 0
CHEST TIGHTNESS: 0
SHORTNESS OF BREATH: 0
DIZZINESS: 0
FLANK PAIN: 0
APPETITE CHANGE: 0
PHOTOPHOBIA: 0
JOINT SWELLING: 0
NECK PAIN: 0
CONFUSION: 0

## 2024-10-23 ASSESSMENT — PATIENT HEALTH QUESTIONNAIRE - PHQ9
2. FEELING DOWN, DEPRESSED OR HOPELESS: NOT AT ALL
1. LITTLE INTEREST OR PLEASURE IN DOING THINGS: NOT AT ALL
SUM OF ALL RESPONSES TO PHQ9 QUESTIONS 1 AND 2: 0

## 2024-10-23 NOTE — PROGRESS NOTES
"Subjective   Patient ID: Nguyen Ponce is a 59 y.o. female who presents for Follow-up (Follow up).  HPI    Here today as a follow up appointment.     Following with Dr. Mari for Cardiology, diagnosed with Heart failure in 2021. Hypertension. Long standing history of Hypertension, uncontrolled. Medications adjusted by Cardiology. Cardiac MRI and ECHO completed. Monitoring her blood pressure at home, states that it will fluctuate. Follow up scheduled with Cardiology.      Complaints of Arthritis. Previously followed with Rheumatology, not interested in following back with them. Using Two Tylenol ES daily. Some improvement after using medication. Did not tolerate Celebrex. Right hand dominant. Worse with getting up in the AM and picking anything up in her hand. \"Tender.\" Feels that it extends down into her hand. Did not have a good response with Rheumatologist in the past. Pain controlled better with weight loss.      Neck pain has improved. Able to improve her ROM. Pain down into the Buttocks, radiating down.      COPD. Following with Milena DUFFY for Pulmonology. Inhaler adjustment.      Patient states that she has chronic issues with Fatigue, cardiac medications adjusted by Cardiology.      Last lab work indicated Diabetes. Has been making some dietary changes to help with her numbers. Does not monitor her blood sugars at home. Started on Metformin, no longer tolerating medication. GI, increased gas. No improvement with XR. Has not tried any other medications besides the Metformin in the past. Trulicity added and is tolerating Trulicity better, still questioning if triggering her GI discomfort. Has been continuing to work on making healthy dietary changes. Sugars elevated off of Trulicity, restarting medication and is tolerating better at this time.     Review of Systems   Constitutional:  Negative for activity change, appetite change, chills, fatigue, fever and unexpected weight change.   HENT:  Negative for ear " pain, hearing loss, nosebleeds, sore throat, tinnitus and trouble swallowing.    Eyes:  Negative for photophobia, pain and visual disturbance.   Respiratory:  Negative for cough, chest tightness, shortness of breath and wheezing.    Cardiovascular:  Negative for chest pain, palpitations and leg swelling.   Gastrointestinal:  Negative for abdominal pain, blood in stool, constipation, diarrhea, nausea and vomiting.   Endocrine: Negative for cold intolerance, heat intolerance, polydipsia and polyuria.   Genitourinary:  Negative for dysuria, flank pain and hematuria.   Musculoskeletal:  Negative for arthralgias, back pain, joint swelling, myalgias and neck pain.   Skin:  Negative for pallor, rash and wound.   Allergic/Immunologic: Negative for immunocompromised state.   Neurological:  Negative for dizziness, seizures and headaches.   Hematological:  Does not bruise/bleed easily.   Psychiatric/Behavioral:  Negative for confusion and sleep disturbance.        Objective   Physical Exam  Vitals and nursing note reviewed.   Constitutional:       General: She is not in acute distress.     Appearance: Normal appearance.   HENT:      Head: Normocephalic.      Nose: Nose normal.   Eyes:      Conjunctiva/sclera: Conjunctivae normal.   Neck:      Vascular: No carotid bruit.   Cardiovascular:      Rate and Rhythm: Normal rate and regular rhythm.      Pulses: Normal pulses.      Heart sounds: Normal heart sounds.   Pulmonary:      Effort: Pulmonary effort is normal.      Breath sounds: Normal breath sounds.   Abdominal:      General: Bowel sounds are normal.      Palpations: Abdomen is soft.   Musculoskeletal:         General: Normal range of motion.      Cervical back: Normal range of motion.   Skin:     General: Skin is warm and dry.   Neurological:      Mental Status: She is alert and oriented to person, place, and time. Mental status is at baseline.   Psychiatric:         Mood and Affect: Mood normal.         Behavior: Behavior  normal.       Assessment/Plan         Reviewed recent Specialist appointments with patient and most recent lab work.      Cardiomyopathy, Hypertension, Heart Failure, Palpitations: Following with Cardiology. Continue medications as prescribed by Cardiology.   COPD: Following with Pulmonology for management.   Obesity: BMI: 33.95. Lifestyle changes recommended: Diet consisting of low fat foods, lean meats, high fiber, fresh fruits and vegetables. 150 min/ weekly aerobic exercise.  Diabetes: Last A1C 6.4%. Lifestyle changes as noted above. Discontinued Metformin, due to tolerance. Monitor blood sugar readings. Call with blood sugar readings. Declined referral for Nutrition at this time. Continue Trulicity. Updated lab work ordered.   Joint Pain: Will continue Tylenol.   Depression/Anxiety: Counseling information provided for patient at last OV.   Vitamin D Deficiency: Vitamin D.     Mammogram: July 2024.    Colonoscopy: May 2024, plan to repeat in 3 years.   Unable to update immunizations due to Insurance.   LDCT: October 2023, plan to repeat in 1 year. Ordered by Pulmonology.   GYN referral. Scheduled with Paty for tomorrow.     Angelique Hauser, CATALINA-CNS 10/23/24 8:25 AM

## 2024-10-24 ENCOUNTER — APPOINTMENT (OUTPATIENT)
Dept: OBSTETRICS AND GYNECOLOGY | Facility: CLINIC | Age: 59
End: 2024-10-24
Payer: COMMERCIAL

## 2024-10-24 VITALS
HEIGHT: 64 IN | SYSTOLIC BLOOD PRESSURE: 110 MMHG | BODY MASS INDEX: 34.66 KG/M2 | WEIGHT: 203 LBS | DIASTOLIC BLOOD PRESSURE: 80 MMHG

## 2024-10-24 DIAGNOSIS — Z11.51 SCREENING FOR HUMAN PAPILLOMAVIRUS (HPV): ICD-10-CM

## 2024-10-24 DIAGNOSIS — Z00.00 HEALTHCARE MAINTENANCE: ICD-10-CM

## 2024-10-24 DIAGNOSIS — Z12.31 VISIT FOR SCREENING MAMMOGRAM: ICD-10-CM

## 2024-10-24 DIAGNOSIS — Z12.4 SCREENING FOR MALIGNANT NEOPLASM OF CERVIX: ICD-10-CM

## 2024-10-24 DIAGNOSIS — Z01.419 ENCOUNTER FOR WELL WOMAN EXAM WITH ROUTINE GYNECOLOGICAL EXAM: Primary | ICD-10-CM

## 2024-10-24 PROCEDURE — 3079F DIAST BP 80-89 MM HG: CPT | Performed by: NURSE PRACTITIONER

## 2024-10-24 PROCEDURE — 3008F BODY MASS INDEX DOCD: CPT | Performed by: NURSE PRACTITIONER

## 2024-10-24 PROCEDURE — 1036F TOBACCO NON-USER: CPT | Performed by: NURSE PRACTITIONER

## 2024-10-24 PROCEDURE — 3044F HG A1C LEVEL LT 7.0%: CPT | Performed by: NURSE PRACTITIONER

## 2024-10-24 PROCEDURE — 87624 HPV HI-RISK TYP POOLED RSLT: CPT

## 2024-10-24 PROCEDURE — 3048F LDL-C <100 MG/DL: CPT | Performed by: NURSE PRACTITIONER

## 2024-10-24 PROCEDURE — 99386 PREV VISIT NEW AGE 40-64: CPT | Performed by: NURSE PRACTITIONER

## 2024-10-24 PROCEDURE — 4010F ACE/ARB THERAPY RXD/TAKEN: CPT | Performed by: NURSE PRACTITIONER

## 2024-10-24 PROCEDURE — 3074F SYST BP LT 130 MM HG: CPT | Performed by: NURSE PRACTITIONER

## 2024-10-24 NOTE — PROGRESS NOTES
"     HPI:   Nguyen Ponce is a 59 y.o. who presents today for her annual gynecologic exam with complaints    She has the following concerns; Last month she has a week long episode of pressure in the vagina. Also felt throbbing. Has a hx of chronic constipation. Felt it more when laying down. No urinary symptoms when this was happening. Has chronic constipation. Recently started on Miralax for constipation- feels this is improving.     GYN HISTORY:  Denies any PMB.      PAP History   Last pap:    2011 History of abnormal pap: no  HPV vaccine: no  @paphx@    Health Screening  Family history of breast, uterine, ovarian or colon cancer: no   Breast cancer: mammogram -done in 2024 Cat. 1   Colon cancer: May 2024; polyps. Getting these done every 3 years.       The patient feels safe at home.         Review of Systems:   Constitutional: no fever and no chills.  Cardiovascular: no chest pain.   Respiratory: no shortness of breath.   Gastrointestinal: no nausea, no abdominal pain and no constipation  Genitourinary: no dysuria, no urinary incontinence, no vaginal dryness, no pelvic pain and no vaginal discharge.   Neurological: no headache.  Psychiatric: no anxiety and no depression.              Objective         /80   Ht 1.635 m (5' 4.37\")   Wt 92.1 kg (203 lb)   BMI 34.45 kg/m²         Physical Exam:   Constitutional: Alert and in no acute distress. Well developed, well nourished.      Neck: No neck asymmetry. Supple. Thyroid not enlarged and there were no palpable thyroid nodules.      Cardiovascular: Heart rate and rhythm were normal, normal S1 and S2, no gallops, and no murmurs.      Pulmonary: No respiratory distress. Clear bilateral breath sounds.      Chest: Breasts: Normal appearance, no nipple discharge and no skin changes. Palpation of breasts and axillae: No palpable mass and no axillary lymphadenopathy.      Abdomen: Soft nontender; no abdominal mass palpated. Normal bowel sounds. No organomegaly.    "   Genitourinary:   - External genitalia: Normal.   - Palpation of lymph nodes in groin: No inguinal lymphadenopathy.   - Bartholin's Urethral and Skenes Glands: Normal.   - Urethra: Normal.    -Bladder: Normal on palpation.   - Vagina: Normal.   - Cervix: Normal.   - Uterus: Normal. Right Adnexa/parametria: Normal. Left Adnexa/parametria: Normal.   - Perianal Area: Normal.      Skin: Normal skin color and pigmentation, normal skin turgor, and no rash     Psychiatric: Alert and oriented x 3. Affect normal to patient baseline. Mood: Appropriate.            Assessment/Plan       Diagnoses and all orders for this visit:  Encounter for well woman exam with routine gynecological exam  Here for well woman exam and to establish care. She is doing well. PAP obtained. Last done in 2011. Mammogram and colonoscopy are up to date.   Healthcare maintenance  -     Referral to Gynecology  Visit for screening mammogram  -     BI mammo bilateral screening tomosynthesis; Future  Screening for malignant neoplasm of cervix  thinprep  Screening for human papillomavirus (HPV)  Thinprep  Follow-up annually; sooner if needed.   Follow-up annually; sooner if needed.       Paty Eaton, APRN-CNP

## 2024-11-12 ENCOUNTER — APPOINTMENT (OUTPATIENT)
Dept: PULMONOLOGY | Facility: HOSPITAL | Age: 59
End: 2024-11-12
Payer: COMMERCIAL

## 2024-11-15 ENCOUNTER — APPOINTMENT (OUTPATIENT)
Dept: PULMONOLOGY | Facility: HOSPITAL | Age: 59
End: 2024-11-15
Payer: COMMERCIAL

## 2024-11-15 ENCOUNTER — TELEPHONE (OUTPATIENT)
Dept: PRIMARY CARE | Facility: CLINIC | Age: 59
End: 2024-11-15
Payer: COMMERCIAL

## 2024-11-15 DIAGNOSIS — I10 BENIGN ESSENTIAL HYPERTENSION: ICD-10-CM

## 2024-11-15 DIAGNOSIS — I50.22 SYSTOLIC HEART FAILURE, CHRONIC: ICD-10-CM

## 2024-11-15 DIAGNOSIS — R00.2 PALPITATIONS: ICD-10-CM

## 2024-11-15 RX ORDER — OLMESARTAN MEDOXOMIL 40 MG/1
40 TABLET ORAL DAILY
Qty: 90 TABLET | Refills: 3 | Status: SHIPPED | OUTPATIENT
Start: 2024-11-15 | End: 2025-11-15

## 2024-11-15 NOTE — TELEPHONE ENCOUNTER
Med Refill   olmesartan (BENIcar) 40 mg tablet [[id number]]     [id number] - [place], [place] - [address]  [address], [facility] [address]  Phone: [phone]  Fax: [phone]     Patient is out of meds

## 2024-11-15 NOTE — TELEPHONE ENCOUNTER
Med Refill   olmesartan (BENIcar) 40 mg tablet [[id number]]      [id number] - [place], [place] - [address]  [address], [facility] [address]  Phone: [phone]  Fax: [phone]      Patient is out of meds

## 2024-11-21 ENCOUNTER — OFFICE VISIT (OUTPATIENT)
Dept: PULMONOLOGY | Facility: HOSPITAL | Age: 59
End: 2024-11-21
Payer: COMMERCIAL

## 2024-11-21 VITALS
WEIGHT: 202.4 LBS | HEIGHT: 65 IN | HEART RATE: 73 BPM | RESPIRATION RATE: 16 BRPM | SYSTOLIC BLOOD PRESSURE: 111 MMHG | OXYGEN SATURATION: 95 % | DIASTOLIC BLOOD PRESSURE: 78 MMHG | BODY MASS INDEX: 33.72 KG/M2

## 2024-11-21 DIAGNOSIS — J30.9 ALLERGIC RHINITIS, UNSPECIFIED SEASONALITY, UNSPECIFIED TRIGGER: ICD-10-CM

## 2024-11-21 DIAGNOSIS — Z87.891 FORMER SMOKER: Primary | ICD-10-CM

## 2024-11-21 DIAGNOSIS — J44.9 CHRONIC OBSTRUCTIVE PULMONARY DISEASE, UNSPECIFIED COPD TYPE (MULTI): ICD-10-CM

## 2024-11-21 PROCEDURE — 4010F ACE/ARB THERAPY RXD/TAKEN: CPT | Performed by: NURSE PRACTITIONER

## 2024-11-21 PROCEDURE — 3048F LDL-C <100 MG/DL: CPT | Performed by: NURSE PRACTITIONER

## 2024-11-21 PROCEDURE — 1036F TOBACCO NON-USER: CPT | Performed by: NURSE PRACTITIONER

## 2024-11-21 PROCEDURE — 3074F SYST BP LT 130 MM HG: CPT | Performed by: NURSE PRACTITIONER

## 2024-11-21 PROCEDURE — 3078F DIAST BP <80 MM HG: CPT | Performed by: NURSE PRACTITIONER

## 2024-11-21 PROCEDURE — 3044F HG A1C LEVEL LT 7.0%: CPT | Performed by: NURSE PRACTITIONER

## 2024-11-21 PROCEDURE — 99213 OFFICE O/P EST LOW 20 MIN: CPT | Performed by: NURSE PRACTITIONER

## 2024-11-21 PROCEDURE — 3008F BODY MASS INDEX DOCD: CPT | Performed by: NURSE PRACTITIONER

## 2024-11-21 RX ORDER — BUDESONIDE, GLYCOPYRROLATE, AND FORMOTEROL FUMARATE 160; 9; 4.8 UG/1; UG/1; UG/1
2 AEROSOL, METERED RESPIRATORY (INHALATION) 2 TIMES DAILY
Qty: 10.7 G | Refills: 11 | Status: SHIPPED | OUTPATIENT
Start: 2024-11-21

## 2024-11-21 RX ORDER — ALBUTEROL SULFATE 90 UG/1
2 INHALANT RESPIRATORY (INHALATION) EVERY 4 HOURS PRN
Qty: 18 G | Refills: 11 | Status: SHIPPED | OUTPATIENT
Start: 2024-11-21

## 2024-11-21 RX ORDER — BUDESONIDE, GLYCOPYRROLATE, AND FORMOTEROL FUMARATE 160; 9; 4.8 UG/1; UG/1; UG/1
AEROSOL, METERED RESPIRATORY (INHALATION) 2 TIMES DAILY
COMMUNITY
End: 2024-11-21 | Stop reason: SDUPTHER

## 2024-11-21 ASSESSMENT — ENCOUNTER SYMPTOMS
RHINORRHEA: 0
UNEXPECTED WEIGHT CHANGE: 0
FATIGUE: 0
COUGH: 0
WHEEZING: 0
FEVER: 0
SHORTNESS OF BREATH: 1
CHILLS: 0

## 2024-11-21 NOTE — PROGRESS NOTES
Subjective   Patient ID: Nguyen Ponce is a 59 y.o. female who presents for COPD follow up.    HPI: Patient has PMH of COPD, allergic rhinitis, and chronic systolic and diastolic heart failure. She is a former smoker, quit 2022, but smoked at 1 ppd X 40 years. She is currently on Trelegy and uses albuterol prn. She has not had CT done yet but has this scheduled for 10/18. She would like to change to a mist inhaler. She has no other concerns.     Today she is here for follow up. She states that her breathing has been stable on Breztri. She has not needed to use albuterol often. She states this time of year is good for her allergies. She has no other concerns.     Review of Systems   Constitutional:  Negative for chills, fatigue, fever and unexpected weight change.   HENT:  Negative for congestion, postnasal drip and rhinorrhea.    Respiratory:  Positive for shortness of breath. Negative for cough (denies hemoptysis.) and wheezing.    Cardiovascular:  Negative for chest pain and leg swelling.   All other systems reviewed and are negative.      Objective   Physical Exam  Vitals reviewed.   Constitutional:       Appearance: Normal appearance.   HENT:      Head: Normocephalic.   Cardiovascular:      Rate and Rhythm: Normal rate and regular rhythm.   Pulmonary:      Effort: Pulmonary effort is normal.      Breath sounds: Decreased breath sounds present.   Skin:     General: Skin is warm and dry.   Neurological:      Mental Status: She is alert.       Assessment/Plan     COPD  2.   Nicotine dependence, in remission  3.   Allergic rhinitis  4.   Chronic systolic and diastolic heart failure  5.   Obesity      Plan:     -PFTs with FEV1/FVC 61, FEV1 54. AAT normal.  -Continue Breztri.    -Continue albuterol prn.   -IGE, RAST negative.   -Continue Flonase, Montelukast. Will change loratadine to Zyrtec, she feels Loratadine is not working as well anymore. Will also add Azelastine.   -She is a former smoker, quit 2022 but  smoked for 40 years at 1 ppd.   -LDCT was done 10/18/23 and showed mild emphysema, no suspicious nodules, will repeat October 2024, order placed today. She has not gotten this done yet, I instructed her to get this done soon.   -She has lost over 25 lbs since being treated for heart failure.   -She was tested for CEDIRC in 2015 and was negative, continue weight loss strategies.     Overall I will continue current regimen and call her with LDCT. I will bring her back with me in 9 months. I instructed patient to call sooner if needed.      Total time:  20 min.

## 2024-11-21 NOTE — PATIENT INSTRUCTIONS
Continue Breztri twice a day, everyday.  Continue on albuterol prn.   Continue on Cetirizine (Zyrtec) once a day, continue Montelukast at bedtime.   Continue on Flonase, and Azelastine.  Please get CT scan of your chest done.   Call with any questions or concerns.   Follow up with me in 9 months.

## 2024-12-23 DIAGNOSIS — J30.9 ALLERGIC RHINITIS, UNSPECIFIED SEASONALITY, UNSPECIFIED TRIGGER: Primary | ICD-10-CM

## 2024-12-23 RX ORDER — MONTELUKAST SODIUM 10 MG/1
10 TABLET ORAL NIGHTLY
Qty: 90 TABLET | Refills: 3 | Status: SHIPPED | OUTPATIENT
Start: 2024-12-23

## 2025-01-02 ENCOUNTER — TELEPHONE (OUTPATIENT)
Dept: PRIMARY CARE | Facility: CLINIC | Age: 60
End: 2025-01-02
Payer: COMMERCIAL

## 2025-01-02 DIAGNOSIS — I50.22 SYSTOLIC HEART FAILURE, CHRONIC: Primary | ICD-10-CM

## 2025-01-02 RX ORDER — ASPIRIN 81 MG/1
81 TABLET ORAL DAILY
Qty: 90 TABLET | Refills: 3 | Status: SHIPPED | OUTPATIENT
Start: 2025-01-02 | End: 2026-01-02

## 2025-01-02 NOTE — TELEPHONE ENCOUNTER
Med Refill  aspirin 81 mg EC tablet [[id number]]       [id number] - [place], [place] - [address]  [address], [facility] [address]  Phone: [phone]  Fax: [phone]  KASIA #: [phone]

## 2025-01-06 ENCOUNTER — APPOINTMENT (OUTPATIENT)
Dept: PRIMARY CARE | Facility: CLINIC | Age: 60
End: 2025-01-06
Payer: COMMERCIAL

## 2025-02-07 ENCOUNTER — TELEPHONE (OUTPATIENT)
Dept: PRIMARY CARE | Facility: CLINIC | Age: 60
End: 2025-02-07
Payer: COMMERCIAL

## 2025-02-07 ENCOUNTER — APPOINTMENT (OUTPATIENT)
Dept: CARDIOLOGY | Facility: HOSPITAL | Age: 60
End: 2025-02-07
Payer: COMMERCIAL

## 2025-02-07 DIAGNOSIS — E11.65 TYPE 2 DIABETES MELLITUS WITH HYPERGLYCEMIA, WITHOUT LONG-TERM CURRENT USE OF INSULIN: ICD-10-CM

## 2025-02-07 RX ORDER — DULAGLUTIDE 0.75 MG/.5ML
INJECTION, SOLUTION SUBCUTANEOUS
Qty: 2 ML | Refills: 0 | Status: SHIPPED | OUTPATIENT
Start: 2025-02-07 | End: 2025-02-09 | Stop reason: ALTCHOICE

## 2025-02-07 RX ORDER — DULAGLUTIDE 0.75 MG/.5ML
0.75 INJECTION, SOLUTION SUBCUTANEOUS
Qty: 2 ML | Refills: 5 | Status: SHIPPED | OUTPATIENT
Start: 2025-02-09 | End: 2025-02-09 | Stop reason: SDUPTHER

## 2025-02-07 NOTE — TELEPHONE ENCOUNTER
Med Refill   dulaglutide (Trulicity) 0.75 mg/0.5 mL pen injector [[id number]]     [id number] - [place], [place] - [address]  [address], [facility] [address]  Phone: [phone]  Fax: [phone]  KASIA #: --     Patient is out of medication

## 2025-02-08 ENCOUNTER — PATIENT MESSAGE (OUTPATIENT)
Dept: PRIMARY CARE | Facility: CLINIC | Age: 60
End: 2025-02-08
Payer: COMMERCIAL

## 2025-02-08 DIAGNOSIS — I50.22 SYSTOLIC HEART FAILURE, CHRONIC: Primary | ICD-10-CM

## 2025-02-08 DIAGNOSIS — E11.65 TYPE 2 DIABETES MELLITUS WITH HYPERGLYCEMIA, WITHOUT LONG-TERM CURRENT USE OF INSULIN: ICD-10-CM

## 2025-02-08 DIAGNOSIS — E11.9 TYPE 2 DIABETES MELLITUS WITHOUT COMPLICATION, WITHOUT LONG-TERM CURRENT USE OF INSULIN (MULTI): ICD-10-CM

## 2025-02-09 RX ORDER — DULAGLUTIDE 0.75 MG/.5ML
0.75 INJECTION, SOLUTION SUBCUTANEOUS
Qty: 2 ML | Refills: 5 | Status: SHIPPED | OUTPATIENT
Start: 2025-02-09

## 2025-02-10 ENCOUNTER — TELEPHONE (OUTPATIENT)
Dept: PRIMARY CARE | Facility: CLINIC | Age: 60
End: 2025-02-10
Payer: COMMERCIAL

## 2025-02-10 NOTE — TELEPHONE ENCOUNTER
Patient called in stating that her new insurance company is wanting a prior auth for her Trulicity. She is 5 days without as of today and is worrried about getting her meds. The pharmacy has it filled just waiting on insurance auth.  dulaglutide (Trulicity) 0.75 mg/0.5 mL pen injector [[id number]]    Order Details  Dose: 0.75 mg Route: subcutaneous Frequency: Once Weekly   Dispense Quantity: 2 mL Refills: 5          Sig: Inject 0.75 mg under the skin 1 (one) time per week. On Saturdays         Start Date: 02/09/25 End Date: --   Written Date: 02/09/25 Rx Expiration Date: 02/09/26        Associated Diagnoses: Type 2 diabetes mellitus with hyperglycemia, without long-term current use of insulin [E11.65]; Systolic heart failure, chronic [I50.22]   Original Order: dulaglutide (Trulicity) 0.75 mg/0.5 mL pen injector [[id number]]   Providers  Pharmacy    [id number] [place] [address] [address]

## 2025-03-10 NOTE — PROGRESS NOTES
"Counseling:  The patient was counseled regarding diagnostic results, instructions for management, risk factor reductions, prognosis, patient and family education, impressions, risks and benefits of treatment options and importance of compliance with treatment.       Chief Complaint:   The patient presents today for 6-month followup of heart failure, palpitations and HTN.      History Of Present Illness:    Nguyen Ponce is a 59 year old female patient who presents today for 6-month followup of heart failure, palpitations and HTN. Her PMH is significant for HTN, anxiety, COPD, depression, nicotine dependence, obesity, PVD, palpitations, chronic systolic heart failure, and h/o pneumonia. Over the past 6 months, the patient states that she has done well from a cardiac standpoint. She denies any CP, chest discomfort or SOB. She reports occasional palpitations that seem to correlated with her blood sugars. BP has been stable. The patient is compliant with her prescribed medications.      Last Recorded Vitals:  Vitals:    03/11/25 1322   BP: 104/68   BP Location: Right arm   Pulse: 73   Weight: 95.3 kg (210 lb)   Height: 1.651 m (5' 5\")       Past Surgical History:  She has a past surgical history that includes Cholecystectomy (05/17/2018); Tonsillectomy (05/17/2018); Other surgical history (03/12/2020); Cardiac catheterization (August 2021); and Gastric fundoplication.      Social History:  She reports that she quit smoking about 18 years ago. Her smoking use included cigarettes. She has never used smokeless tobacco. She reports current alcohol use. She reports that she does not use drugs.    Family History:  Family History   Adopted: Yes   Problem Relation Name Age of Onset    Hypertension Mother Ree Marion     Other (palpitations) Mother Ree Marion     Other (palpitations) Sister      Other (stomach issues) Sister      Diabetes Daughter      Brain Aneurysm Mother's Sister      Brain Aneurysm Paternal Grandmother  "     Brain Aneurysm Paternal Grandfather          Allergies:  Patient has no known allergies.    Outpatient Medications:  Current Outpatient Medications   Medication Instructions    albuterol 90 mcg/actuation inhaler 2 puffs, inhalation, Every 4 hours PRN    aspirin 81 mg, oral, Daily    atorvastatin (LIPITOR) 40 mg, oral, Daily    azelastine (Astelin) 137 mcg (0.1 %) nasal spray 1 spray, Each Nostril, 2 times daily, Use in each nostril as directed    budesonide-glycopyr-formoterol (Breztri Aerosphere) 160-9-4.8 mcg/actuation HFA aerosol inhaler 2 puffs, inhalation, 2 times daily    carvedilol (COREG) 12.5 mg, oral, 2 times daily (morning and late afternoon)    cetirizine (ZYRTEC) 10 mg, oral, Daily    cholecalciferol (VITAMIN D-3) 50 mcg, Daily    fluticasone (Flonase) 50 mcg/actuation nasal spray 1 spray, Each Nostril, 2 times daily    ipratropium-albuteroL (Duo-Neb) 0.5-2.5 mg/3 mL nebulizer solution 3 mL, Every 4 hours PRN    montelukast (SINGULAIR) 10 mg, oral, Nightly    multivitamin tablet 1 tablet, Daily    olmesartan (BENICAR) 40 mg, oral, Daily    polyethylene glycol (GLYCOLAX, MIRALAX) 17 g, oral, Daily    spironolactone (ALDACTONE) 25 mg, oral, Daily    Trulicity 0.75 mg, subcutaneous, Once Weekly, On Saturdays     Review of Systems   All other systems reviewed and are negative.     Physical Exam:  Constitutional:       Appearance: Healthy appearance. Not in distress.   Neck:      Vascular: No JVR. JVD normal.   Pulmonary:      Effort: Pulmonary effort is normal.      Breath sounds: Normal breath sounds. No wheezing. No rhonchi. No rales.   Chest:      Chest wall: Not tender to palpatation.   Cardiovascular:      PMI at left midclavicular line. Normal rate. Regular rhythm. Normal S1. Normal S2.       Murmurs: There is no murmur.      No gallop.  No click. No rub.   Pulses:     Intact distal pulses.   Edema:     Peripheral edema absent.   Abdominal:      General: Bowel sounds are normal.      Palpations:  Abdomen is soft.      Tenderness: There is no abdominal tenderness.   Musculoskeletal: Normal range of motion.         General: No tenderness. Skin:     General: Skin is warm and dry.   Neurological:      General: No focal deficit present.      Mental Status: Alert and oriented to person, place and time.          Last Labs:  CBC -  Lab Results   Component Value Date    WBC 11.6 (H) 08/13/2024    HGB 15.2 08/13/2024    HCT 44.5 08/13/2024     (H) 08/13/2024     08/13/2024       CMP -  Lab Results   Component Value Date    CALCIUM 9.4 08/13/2024    PROT 7.5 08/13/2024    ALBUMIN 4.6 08/13/2024    AST 18 08/13/2024    ALT 23 08/13/2024    ALKPHOS 63 08/13/2024    BILITOT 0.6 08/13/2024       LIPID PANEL -   Lab Results   Component Value Date    CHOL 114 07/03/2024    TRIG 118 07/03/2024    HDL 40.4 07/03/2024    CHHDL 2.8 07/03/2024    LDLF 137 (H) 07/20/2023    VLDL 24 07/03/2024    NHDL 74 07/03/2024       RENAL FUNCTION PANEL -   Lab Results   Component Value Date    GLUCOSE 93 08/13/2024     (L) 08/13/2024    K 4.3 08/13/2024    CL 98 08/13/2024    CO2 25 08/13/2024    ANIONGAP 12 08/13/2024    BUN 28 (H) 08/13/2024    CREATININE 0.93 08/13/2024    CALCIUM 9.4 08/13/2024    ALBUMIN 4.6 08/13/2024        Lab Results   Component Value Date    BNP 24 08/13/2024    HGBA1C 6.4 (H) 07/03/2024       Last Cardiology Tests:  01/12/2023 - TTE  1. Left ventricular systolic function is normal with a 60-65% estimated ejection fraction.  2. There is moderate concentric left ventricular hypertrophy.  3. Moderately enlarged right ventricle.  4. Mild to moderate mitral valve regurgitation.  5. Mildly elevated RVSP.     01/14/2022 to 02/12/2022 - Event Monitoring  1. Baseline transmission showing sinus rhythm with a heart rate of 62 bpm.   2. PAC burden of 1%.  3. One episode of asymptomatic PSVT lasting 17 seconds with a heart rate of 120 bpm.  4. PVC burden of 3%.  5. Two patient triggered events with reported  lightheadedness or heart racing correlating with sinus rhythm and PVC or sinus rhythm and frequent PVCs in a bigeminal pattern.      12/13/2021 - TTE  The left ventricular systolic function is low normal with a 50-55% estimated ejection fraction.     10/11/2021 - Cardiac MRI  1. Mildly dilated LV size (EDVi 95 ml/m2) with moderately reduced systolic function (LVEF 33%) with global hypokinesis. Non specific paradoxical septal motion without respirophasic variations; correlate with arrhythmia/conduction abnormality.  2. Borderline concentric left ventricular hypertrophy with septal thickness 1.2 cm in diameter  3. Moderate mitral regurgitation with regurgitant volume 23 mL and regurgitant fraction of 35%  4. No evidence of myocardial fibrosis, infiltration or infarction (based on LGE).     08/26/2021 - Cardiac Catheterization (LH/RH)  No evidence of significant coronary artery disease.     08/24/2021 - TTE  1. The left ventricular systolic function is severely decreased with a 25% estimated ejection fraction.  2. Spectral Doppler shows a pseudonormal pattern of left ventricular diastolic filling.  3. There is moderate concentric left ventricular hypertrophy.  4. There is moderately reduced right ventricular systolic function.  5. The left atrium is moderate to severely dilated.  6. Moderate to severe mitral valve regurgitation.  7. Moderately elevated right ventricular systolic pressure.  8. There is mild to moderate tricuspid regurgitation.  9. There is global hypokinesis of the left ventricle with minor regional variations.     08/24/2021 - CT Abdomen and Pelvis  1. Limited examination performed without intravenous contrast. No evidence of bowel obstruction. Colonic diverticulosis without evidence of acute diverticulitis. No definite evidence of fistulous connection between the colon and the vagina on the examination. Evaluation is however limited, and if there is persistent concern for fistulous connection, GI  fluoroscopic enema examination is recommended for evaluation.  2. Edema in the subcutaneous fat of the abdominal and pelvic wall and also evidence of anterior abdominal wall skin thickening; please clinically correlate with physical examination for cellulitis.  3. Bibasilar consolidative/atelectatic opacities; left lower lobe pneumonia is not excluded, and clinical correlation for infectious symptoms recommended.     08/24/2021 - CXR  Stable cardiomegaly. Bibasilar scarring or atelectasis.     07/27/2021 - CTA Chest  1. No pulmonary embolism.  2. Patchy areas of airspace consolidation in the left lower lobe persistent compared to prior recent exams. Correlate clinically for persistent pneumonia versus areas of atelectasis or scarring.  3. 1.7 cm left adrenal adenoma.     06/28/2021 - Vascular Lab PVR w/o Exercise  1. Bilateral Lower PVR: No evidence of arterial occlusive disease bilaterally in the lower extremities at rest. Triphasic flow is noted in the common femoral arteries, posterior tibial arteries and dorsalis pedis arteries.  2. Right Lower PVR: Normal digital perfusion noted.  3. Left Lower PVR: Normal digital perfusion noted.     06/28/2021- Vascular Lab Venous Insufficiency/Reflux U/S  1. Right Lower Venous Insufficiency: Right leg demonstrates no evidence of deep vein thrombosis or deep or superficial venous insufficiency.  2. Left Lower Venous Insufficiency: Left leg demonstrates no evidence of deep vein thrombosis or deep or superficial venous insufficiency.  3. Additional Findings: Pulsatile flow is noted throughout. The SSV arises proximal to the popliteal bilaterally([place]i).     05/17/2021 - CXR  Left lower lobe pneumonia which has improved as compared to the earlier study.     05/174/2021 - U/S Duplex Lower Extremity Veins, Right, Unilateral  1. No evidence for DVT within the right lower extremity.  2. Right Mcfadden's cyst.     05/03/2021 - CXR  Left lower lobe consolidation suspicious for  pneumonia. Follow-up is advised.     06/01/2018 - Holter Monitor (23 hr 59 min)  1. The basic rhythm is sinus with rates between 52 - 117 bpm. Sinus bradyarrhythmia was noted. The longest R-R interval was 1.6 seconds. The longest N-N interval was 1.3 seconds.   2. Supraventricular ectopic activity consisted of 34 beats, of which 5 were in 1 run of atrial tachycardia, 29 were single PACs. The longest supraventricular run consisted of 5 beats, with maximum heart rate of 109 bpm. The fastest supraventricular run consisted of 5 beats, with a maximum heart rate of 109 bpm.   3. Multifocal ventricular ectopic activity consisted of 758 beats, of which 10 were in couplets, 748 were single PVCs, including clusters.      06/01/2018 - Exercise Stress Echocardiogram  1. Normal exercise stress echocardiogram.  2. 92% max predicted heart rate in 9-10 METS.  3. Blood pressure response is hypertensive diastolic blood pressure.  4. No significant EKG changes.  5. Test ended due to patient's fatigue.  6. Stress provoked no symptoms.      Lab review: I have personally reviewed the laboratory result(s).    Assessment/Plan   1) Chronic Systolic Heart Failure - EF improved  On carvedilol 12.5 mg BID, olmesartan 40 mg daily, spironolactone 25 mg daily  TTE Dec 2021 with LVEF 50-55%  Compensated on exam  [facility] Aug 2021 with no significant CAD  TTE 01/12/2023 with LVEF 60-65%, moderate concentric LVH, mild to moderate MR, mildly elevated RVSP  Denies CP, chest discomfort or SOB  BP stable   Continue current medical Rx   Repeat echo 6 months  Followup with Angelica Encinas CNP, in 6 months     2) Palpitations  On carvedilol 12.5 mg BID  Event Monitor Jan/Feb 2022 with one episode of short SVT  Patient counselled about avoiding caffeine  Reports occasional palpitations - typically correlates with blood sugars   Continue current medical Rx   Followup with Angelica Encinas CNP, in 6 months     3) HTN  Stable   On carvedilol 12.5 mg BID, olmesartan 40  mg daily, spironolactone 25 mg daily  Continue current medical Rx   Followup with Angelica Encinas CNP, in 6 months    4) Sleep Disturbance, BLE edema  Wakes several times throughout the night  Per patient, sleep study in 2015 was negative  Previously referred to sleep medicine - has not established         Scribe Attestation  By signing my name below, I, Jeanne Cuello, Scribe   attest that this documentation has been prepared under the direction and in the presence of Saúl Mari MD.

## 2025-03-11 ENCOUNTER — OFFICE VISIT (OUTPATIENT)
Dept: CARDIOLOGY | Facility: HOSPITAL | Age: 60
End: 2025-03-11
Payer: COMMERCIAL

## 2025-03-11 VITALS
HEIGHT: 65 IN | BODY MASS INDEX: 34.99 KG/M2 | SYSTOLIC BLOOD PRESSURE: 104 MMHG | HEART RATE: 73 BPM | WEIGHT: 210 LBS | DIASTOLIC BLOOD PRESSURE: 68 MMHG

## 2025-03-11 DIAGNOSIS — Z86.79 HISTORY OF PAROXYSMAL SUPRAVENTRICULAR TACHYCARDIA: ICD-10-CM

## 2025-03-11 DIAGNOSIS — I50.42 CHRONIC COMBINED SYSTOLIC AND DIASTOLIC CHF, NYHA CLASS 3 (MULTI): ICD-10-CM

## 2025-03-11 DIAGNOSIS — R00.2 PALPITATIONS: ICD-10-CM

## 2025-03-11 DIAGNOSIS — E78.2 MIXED HYPERLIPIDEMIA: ICD-10-CM

## 2025-03-11 DIAGNOSIS — I10 BENIGN ESSENTIAL HYPERTENSION: ICD-10-CM

## 2025-03-11 PROCEDURE — 3078F DIAST BP <80 MM HG: CPT | Performed by: INTERNAL MEDICINE

## 2025-03-11 PROCEDURE — 3074F SYST BP LT 130 MM HG: CPT | Performed by: INTERNAL MEDICINE

## 2025-03-11 PROCEDURE — 99213 OFFICE O/P EST LOW 20 MIN: CPT | Performed by: INTERNAL MEDICINE

## 2025-03-11 PROCEDURE — 3008F BODY MASS INDEX DOCD: CPT | Performed by: INTERNAL MEDICINE

## 2025-03-11 PROCEDURE — 1036F TOBACCO NON-USER: CPT | Performed by: INTERNAL MEDICINE

## 2025-03-11 PROCEDURE — 4010F ACE/ARB THERAPY RXD/TAKEN: CPT | Performed by: INTERNAL MEDICINE

## 2025-03-11 ASSESSMENT — ENCOUNTER SYMPTOMS
LOSS OF SENSATION IN FEET: 1
OCCASIONAL FEELINGS OF UNSTEADINESS: 0
DEPRESSION: 0

## 2025-03-11 NOTE — PATIENT INSTRUCTIONS
Continue all current medications as prescribed.  Repeat echocardiogram (ultrasound of the heart) in 6 months to followup on your heart function and structure.   Followup with Angelica Encinas CNP, in 6 months.     If you have any questions or cardiac concerns, please call our office at [phone].

## 2025-03-13 ENCOUNTER — TELEPHONE (OUTPATIENT)
Dept: PRIMARY CARE | Facility: CLINIC | Age: 60
End: 2025-03-13
Payer: COMMERCIAL

## 2025-03-13 DIAGNOSIS — B00.1 COLD SORE: Primary | ICD-10-CM

## 2025-03-13 RX ORDER — ACYCLOVIR 400 MG/1
400 TABLET ORAL
Qty: 25 TABLET | Refills: 1 | Status: SHIPPED | OUTPATIENT
Start: 2025-03-13 | End: 2025-03-18

## 2025-03-13 NOTE — TELEPHONE ENCOUNTER
Patient has a cold sore and was told there is a oral medication you can call in for her to [facility]

## 2025-04-19 LAB
25(OH)D3+25(OH)D2 SERPL-MCNC: 36 NG/ML (ref 30–100)
ALBUMIN SERPL-MCNC: 4.5 G/DL (ref 3.6–5.1)
ALP SERPL-CCNC: 53 U/L (ref 37–153)
ALT SERPL-CCNC: 18 U/L (ref 6–29)
ANION GAP SERPL CALCULATED.4IONS-SCNC: 8 MMOL/L (CALC) (ref 7–17)
AST SERPL-CCNC: 13 U/L (ref 10–35)
BILIRUB SERPL-MCNC: 0.5 MG/DL (ref 0.2–1.2)
BUN SERPL-MCNC: 35 MG/DL (ref 7–25)
CALCIUM SERPL-MCNC: 9.3 MG/DL (ref 8.6–10.4)
CHLORIDE SERPL-SCNC: 103 MMOL/L (ref 98–110)
CHOLEST SERPL-MCNC: 130 MG/DL
CHOLEST/HDLC SERPL: 2.6 (CALC)
CO2 SERPL-SCNC: 25 MMOL/L (ref 20–32)
CREAT SERPL-MCNC: 1 MG/DL (ref 0.5–1.03)
EGFRCR SERPLBLD CKD-EPI 2021: 65 ML/MIN/1.73M2
ERYTHROCYTE [DISTWIDTH] IN BLOOD BY AUTOMATED COUNT: 12.1 % (ref 11–15)
EST. AVERAGE GLUCOSE BLD GHB EST-MCNC: 128 MG/DL
EST. AVERAGE GLUCOSE BLD GHB EST-SCNC: 7.1 MMOL/L
GLUCOSE SERPL-MCNC: 109 MG/DL (ref 65–99)
HBA1C MFR BLD: 6.1 %
HCT VFR BLD AUTO: 42.1 % (ref 35–45)
HDLC SERPL-MCNC: 50 MG/DL
HGB BLD-MCNC: 14.2 G/DL (ref 11.7–15.5)
HIV 1+2 AB+HIV1 P24 AG SERPL QL IA: NORMAL
LDLC SERPL CALC-MCNC: 62 MG/DL (CALC)
MCH RBC QN AUTO: 34.7 PG (ref 27–33)
MCHC RBC AUTO-ENTMCNC: 33.7 G/DL (ref 32–36)
MCV RBC AUTO: 102.9 FL (ref 80–100)
NONHDLC SERPL-MCNC: 80 MG/DL (CALC)
PLATELET # BLD AUTO: 307 THOUSAND/UL (ref 140–400)
PMV BLD REES-ECKER: 10.6 FL (ref 7.5–12.5)
POTASSIUM SERPL-SCNC: 4.8 MMOL/L (ref 3.5–5.3)
PROT SERPL-MCNC: 6.9 G/DL (ref 6.1–8.1)
RBC # BLD AUTO: 4.09 MILLION/UL (ref 3.8–5.1)
SODIUM SERPL-SCNC: 136 MMOL/L (ref 135–146)
TRIGL SERPL-MCNC: 92 MG/DL
TSH SERPL-ACNC: 2.32 MIU/L (ref 0.4–4.5)
VIT B12 SERPL-MCNC: 549 PG/ML (ref 200–1100)
WBC # BLD AUTO: 10.5 THOUSAND/UL (ref 3.8–10.8)

## 2025-04-23 ENCOUNTER — APPOINTMENT (OUTPATIENT)
Dept: PRIMARY CARE | Facility: CLINIC | Age: 60
End: 2025-04-23
Payer: COMMERCIAL

## 2025-04-23 VITALS
BODY MASS INDEX: 35.99 KG/M2 | HEART RATE: 64 BPM | DIASTOLIC BLOOD PRESSURE: 80 MMHG | HEIGHT: 65 IN | WEIGHT: 216 LBS | SYSTOLIC BLOOD PRESSURE: 112 MMHG

## 2025-04-23 DIAGNOSIS — E78.2 MIXED HYPERLIPIDEMIA: ICD-10-CM

## 2025-04-23 DIAGNOSIS — K52.9 COLITIS: ICD-10-CM

## 2025-04-23 DIAGNOSIS — I50.42 CHRONIC COMBINED SYSTOLIC AND DIASTOLIC CHF, NYHA CLASS 3 (MULTI): ICD-10-CM

## 2025-04-23 DIAGNOSIS — I10 BENIGN ESSENTIAL HYPERTENSION: ICD-10-CM

## 2025-04-23 DIAGNOSIS — Z00.00 WELCOME TO MEDICARE PREVENTIVE VISIT: ICD-10-CM

## 2025-04-23 DIAGNOSIS — I50.22 SYSTOLIC HEART FAILURE, CHRONIC: ICD-10-CM

## 2025-04-23 DIAGNOSIS — E55.9 VITAMIN D DEFICIENCY: Primary | ICD-10-CM

## 2025-04-23 DIAGNOSIS — E11.65 TYPE 2 DIABETES MELLITUS WITH HYPERGLYCEMIA, WITHOUT LONG-TERM CURRENT USE OF INSULIN: ICD-10-CM

## 2025-04-23 DIAGNOSIS — E66.01 MORBID OBESITY (MULTI): ICD-10-CM

## 2025-04-23 DIAGNOSIS — J42 CHRONIC BRONCHITIS, UNSPECIFIED CHRONIC BRONCHITIS TYPE (MULTI): ICD-10-CM

## 2025-04-23 DIAGNOSIS — J43.2 CENTRILOBULAR EMPHYSEMA (MULTI): ICD-10-CM

## 2025-04-23 DIAGNOSIS — J43.8 OTHER EMPHYSEMA (MULTI): ICD-10-CM

## 2025-04-23 DIAGNOSIS — J44.9 CHRONIC OBSTRUCTIVE PULMONARY DISEASE, UNSPECIFIED COPD TYPE (MULTI): ICD-10-CM

## 2025-04-23 PROCEDURE — 3008F BODY MASS INDEX DOCD: CPT | Performed by: CLINICAL NURSE SPECIALIST

## 2025-04-23 PROCEDURE — 4010F ACE/ARB THERAPY RXD/TAKEN: CPT | Performed by: CLINICAL NURSE SPECIALIST

## 2025-04-23 PROCEDURE — 3079F DIAST BP 80-89 MM HG: CPT | Performed by: CLINICAL NURSE SPECIALIST

## 2025-04-23 PROCEDURE — 99214 OFFICE O/P EST MOD 30 MIN: CPT | Performed by: CLINICAL NURSE SPECIALIST

## 2025-04-23 PROCEDURE — G0438 PPPS, INITIAL VISIT: HCPCS | Performed by: CLINICAL NURSE SPECIALIST

## 2025-04-23 PROCEDURE — G0009 ADMIN PNEUMOCOCCAL VACCINE: HCPCS | Performed by: CLINICAL NURSE SPECIALIST

## 2025-04-23 PROCEDURE — 1036F TOBACCO NON-USER: CPT | Performed by: CLINICAL NURSE SPECIALIST

## 2025-04-23 PROCEDURE — 3074F SYST BP LT 130 MM HG: CPT | Performed by: CLINICAL NURSE SPECIALIST

## 2025-04-23 PROCEDURE — 90677 PCV20 VACCINE IM: CPT | Performed by: CLINICAL NURSE SPECIALIST

## 2025-04-23 RX ORDER — ATORVASTATIN CALCIUM 40 MG/1
40 TABLET, FILM COATED ORAL DAILY
Qty: 90 TABLET | Refills: 3 | Status: SHIPPED | OUTPATIENT
Start: 2025-04-23 | End: 2026-04-23

## 2025-04-23 RX ORDER — CARVEDILOL 12.5 MG/1
12.5 TABLET ORAL
Qty: 180 TABLET | Refills: 3 | Status: SHIPPED | OUTPATIENT
Start: 2025-04-23 | End: 2026-04-23

## 2025-04-23 RX ORDER — SPIRONOLACTONE 25 MG/1
25 TABLET ORAL DAILY
Qty: 90 TABLET | Refills: 3 | Status: SHIPPED | OUTPATIENT
Start: 2025-04-23 | End: 2026-04-23

## 2025-04-23 RX ORDER — DULAGLUTIDE 0.75 MG/.5ML
0.75 INJECTION, SOLUTION SUBCUTANEOUS
Qty: 2 ML | Refills: 5 | Status: SHIPPED | OUTPATIENT
Start: 2025-04-27

## 2025-04-23 ASSESSMENT — ENCOUNTER SYMPTOMS
DYSURIA: 0
DIZZINESS: 0
VOMITING: 0
ARTHRALGIAS: 0
ABDOMINAL PAIN: 0
PHOTOPHOBIA: 0
HEMATURIA: 0
DEPRESSION: 0
UNEXPECTED WEIGHT CHANGE: 0
NECK PAIN: 0
CONFUSION: 0
WHEEZING: 0
JOINT SWELLING: 0
FATIGUE: 0
EYE PAIN: 0
SEIZURES: 0
TROUBLE SWALLOWING: 0
MYALGIAS: 0
COUGH: 0
BRUISES/BLEEDS EASILY: 0
BLOOD IN STOOL: 0
NAUSEA: 0
PALPITATIONS: 0
CONSTIPATION: 0
OCCASIONAL FEELINGS OF UNSTEADINESS: 0
WOUND: 0
LOSS OF SENSATION IN FEET: 0
SLEEP DISTURBANCE: 0
BACK PAIN: 0
HEADACHES: 0
POLYDIPSIA: 0
SORE THROAT: 0
SHORTNESS OF BREATH: 0
FEVER: 0
FLANK PAIN: 0
APPETITE CHANGE: 0
CHEST TIGHTNESS: 0
DIARRHEA: 0
CHILLS: 0
ACTIVITY CHANGE: 0

## 2025-04-23 ASSESSMENT — ACTIVITIES OF DAILY LIVING (ADL)
MANAGING_FINANCES: INDEPENDENT
DRESSING: INDEPENDENT
TAKING_MEDICATION: INDEPENDENT
GROCERY_SHOPPING: INDEPENDENT
BATHING: INDEPENDENT
DOING_HOUSEWORK: INDEPENDENT

## 2025-04-23 NOTE — PROGRESS NOTES
"Subjective   Patient ID: Nguyen Ponce is a 59 y.o. female who presents for Annual Exam (Wellness exam ) and Follow-up (Follow up/She is requesting refills that come from [facility]).  HPI    Here today as a follow up appointment. Due for a Medicare Wellness.      Following with Dr. Mari for Cardiology, diagnosed with Heart failure in 2021. Hypertension. Long standing history of Hypertension, uncontrolled. Medications adjusted by Cardiology. Cardiac MRI and ECHO completed. Monitoring her blood pressure at home, states that it will fluctuate. Follow up scheduled with Cardiology.      Complaints of Arthritis. Previously followed with Rheumatology, not interested in following back with them. Using Two Tylenol ES daily. Some improvement after using medication. Did not tolerate Celebrex. Right hand dominant. Worse with getting up in the AM and picking anything up in her hand. \"Tender.\" Feels that it extends down into her hand. Did not have a good response with Rheumatologist in the past. Pain controlled better with weight loss.      Neck pain has improved. Able to improve her ROM. Pain down into the Buttocks, radiating down.      COPD. Following with Milena DUFFY for Pulmonology. Inhaler adjustment.      Patient states that she has chronic issues with Fatigue, cardiac medications adjusted by Cardiology.      Last lab work indicated Diabetes. Has been making some dietary changes to help with her numbers. Does not monitor her blood sugars at home. Started on Metformin, no longer tolerating medication. GI, increased gas. No improvement with XR. Has not tried any other medications besides the Metformin in the past. Trulicity added and is tolerating Trulicity better, still questioning if triggering her GI discomfort. Has been continuing to work on making healthy dietary changes. Sugars elevated off of Trulicity, restarting medication and is tolerating better at this time.        Review of Systems   Constitutional:  Negative for " activity change, appetite change, chills, fatigue, fever and unexpected weight change.   HENT:  Negative for ear pain, hearing loss, nosebleeds, sore throat, tinnitus and trouble swallowing.    Eyes:  Negative for photophobia, pain and visual disturbance.   Respiratory:  Negative for cough, chest tightness, shortness of breath and wheezing.    Cardiovascular:  Negative for chest pain, palpitations and leg swelling.   Gastrointestinal:  Negative for abdominal pain, blood in stool, constipation, diarrhea, nausea and vomiting.   Endocrine: Negative for cold intolerance, heat intolerance, polydipsia and polyuria.   Genitourinary:  Negative for dysuria, flank pain and hematuria.   Musculoskeletal:  Negative for arthralgias, back pain, joint swelling, myalgias and neck pain.   Skin:  Negative for pallor, rash and wound.   Allergic/Immunologic: Negative for immunocompromised state.   Neurological:  Negative for dizziness, seizures and headaches.   Hematological:  Does not bruise/bleed easily.   Psychiatric/Behavioral:  Negative for confusion and sleep disturbance.        Objective   Physical Exam  Vitals and nursing note reviewed.   Constitutional:       General: She is not in acute distress.     Appearance: Normal appearance.   HENT:      Head: Normocephalic.      Nose: Nose normal.   Eyes:      Conjunctiva/sclera: Conjunctivae normal.   Neck:      Vascular: No carotid bruit.   Cardiovascular:      Rate and Rhythm: Normal rate and regular rhythm.      Pulses: Normal pulses.      Heart sounds: Normal heart sounds.   Pulmonary:      Effort: Pulmonary effort is normal.      Breath sounds: Normal breath sounds.   Abdominal:      General: Bowel sounds are normal.      Palpations: Abdomen is soft.   Musculoskeletal:         General: Normal range of motion.      Cervical back: Normal range of motion.   Skin:     General: Skin is warm and dry.   Neurological:      Mental Status: She is alert and oriented to person, place, and  time. Mental status is at baseline.   Psychiatric:         Mood and Affect: Mood normal.         Behavior: Behavior normal.         Assessment/Plan         Reviewed recent Specialist appointments with patient and most recent lab work.      Cardiomyopathy, Hypertension, Heart Failure, Palpitations: Following with Cardiology. Continue medications as prescribed by Cardiology.   COPD: Following with Pulmonology for management.   Obesity: BMI: 35.94. Lifestyle changes recommended: Diet consisting of low fat foods, lean meats, high fiber, fresh fruits and vegetables. 150 min/ weekly aerobic exercise.  Diabetes: Last A1C 6.1%. Lifestyle changes as noted above. Discontinued Metformin, due to tolerance. Monitor blood sugar readings. Call with blood sugar readings. Declined referral for Nutrition at this time. Continue Trulicity. Updated lab work ordered.   Joint Pain: Will continue Tylenol.   Depression/Anxiety: Counseling information provided for patient at last OV.   Vitamin D Deficiency: Vitamin D.      Mammogram: July 2024.    Prevnar 20: April 2025.   Colonoscopy: May 2024, plan to repeat in 3 years.   LDCT: October 2023, plan to repeat in 1 year. Ordered by Pulmonology.   Followed with Paty for GYN.     Angelique Hauser, APRN-CNS 04/23/25 9:43 AM

## 2025-04-24 DIAGNOSIS — J30.9 ALLERGIC RHINITIS, UNSPECIFIED SEASONALITY, UNSPECIFIED TRIGGER: ICD-10-CM

## 2025-04-24 RX ORDER — CETIRIZINE HYDROCHLORIDE 10 MG/1
10 TABLET ORAL DAILY
Qty: 30 TABLET | Refills: 10 | Status: SHIPPED | OUTPATIENT
Start: 2025-04-24

## 2025-05-13 DIAGNOSIS — J44.9 CHRONIC OBSTRUCTIVE PULMONARY DISEASE, UNSPECIFIED COPD TYPE (MULTI): ICD-10-CM

## 2025-05-13 RX ORDER — BUDESONIDE, GLYCOPYRROLATE, AND FORMOTEROL FUMARATE 160; 9; 4.8 UG/1; UG/1; UG/1
2 AEROSOL, METERED RESPIRATORY (INHALATION) 2 TIMES DAILY
Qty: 10.7 G | Refills: 5 | Status: SHIPPED | OUTPATIENT
Start: 2025-05-13

## 2025-07-21 ENCOUNTER — APPOINTMENT (OUTPATIENT)
Dept: RADIOLOGY | Facility: HOSPITAL | Age: 60
End: 2025-07-21
Payer: COMMERCIAL

## 2025-08-13 ENCOUNTER — APPOINTMENT (OUTPATIENT)
Dept: RADIOLOGY | Facility: HOSPITAL | Age: 60
End: 2025-08-13
Payer: COMMERCIAL

## 2025-08-21 ENCOUNTER — APPOINTMENT (OUTPATIENT)
Dept: PULMONOLOGY | Facility: HOSPITAL | Age: 60
End: 2025-08-21
Payer: COMMERCIAL

## 2025-08-26 ENCOUNTER — APPOINTMENT (OUTPATIENT)
Dept: PRIMARY CARE | Facility: CLINIC | Age: 60
End: 2025-08-26
Payer: COMMERCIAL

## 2025-09-10 ENCOUNTER — APPOINTMENT (OUTPATIENT)
Dept: RADIOLOGY | Facility: HOSPITAL | Age: 60
End: 2025-09-10
Payer: COMMERCIAL

## 2025-09-12 ENCOUNTER — APPOINTMENT (OUTPATIENT)
Dept: CARDIOLOGY | Facility: HOSPITAL | Age: 60
End: 2025-09-12
Payer: COMMERCIAL

## 2025-09-15 ENCOUNTER — APPOINTMENT (OUTPATIENT)
Dept: CARDIOLOGY | Facility: HOSPITAL | Age: 60
End: 2025-09-15
Payer: COMMERCIAL

## 2025-09-15 ENCOUNTER — APPOINTMENT (OUTPATIENT)
Dept: RADIOLOGY | Facility: HOSPITAL | Age: 60
End: 2025-09-15
Payer: COMMERCIAL

## 2025-09-22 ENCOUNTER — APPOINTMENT (OUTPATIENT)
Dept: RADIOLOGY | Facility: HOSPITAL | Age: 60
End: 2025-09-22
Payer: COMMERCIAL

## 2025-09-23 ENCOUNTER — APPOINTMENT (OUTPATIENT)
Dept: CARDIOLOGY | Facility: HOSPITAL | Age: 60
End: 2025-09-23
Payer: COMMERCIAL

## 2025-10-06 ENCOUNTER — APPOINTMENT (OUTPATIENT)
Dept: CARDIOLOGY | Facility: HOSPITAL | Age: 60
End: 2025-10-06
Payer: COMMERCIAL

## 2025-10-31 ENCOUNTER — APPOINTMENT (OUTPATIENT)
Dept: OBSTETRICS AND GYNECOLOGY | Facility: CLINIC | Age: 60
End: 2025-10-31
Payer: COMMERCIAL

## 2026-03-02 ENCOUNTER — APPOINTMENT (OUTPATIENT)
Dept: PRIMARY CARE | Facility: CLINIC | Age: 61
End: 2026-03-02
Payer: COMMERCIAL